# Patient Record
Sex: MALE | Race: WHITE
[De-identification: names, ages, dates, MRNs, and addresses within clinical notes are randomized per-mention and may not be internally consistent; named-entity substitution may affect disease eponyms.]

---

## 2017-03-14 NOTE — US
EXAMINATION TYPE: US kidneys/renal and bladder

 

DATE OF EXAM: 3/14/2017 4:57 PM

 

COMPARISON: 12/2/2015

 

CLINICAL HISTORY: RT flank pain R10.9. Renal stones removed from bilateral kidney in December 2016; d
iabetic

 

EXAM MEASUREMENTS:

 

Right Kidney:  10.0 x 6.4 x 5.1 cm

Left Kidney: 11.1 x 5.3 x 5.1 cm

Post Void Residual Volume: patient was unable to void

 

TECHNOLOGIST IMPRESSION:  wnl

 

Right Kidney: No hydronephrosis or masses seen  

Left Kidney: mid medial cortical cyst = 2.2 x 3.2 x 2.0cm; mid pole hyperechoic focus with posterior 
shadowing = 0.3 x 0.3 x 0.3; hyperechoic wedge shaped junctional defect mid cortex; sonographic"sweat
 sign" is noted adjacent to left kidney which suggests renal failure.   

Bladder: Limited by incomplete distention.

**Bilateral Jets seen: not seen after 3 minute observation

 

 

 

 

IMPRESSION:

1. Simple appearing left renal cyst is stable.

2. Nonobstructing 3 mm left renal calculus.

## 2017-03-23 NOTE — MR
EXAMINATION TYPE: MR shoulder RT wo con

 

DATE OF EXAM: 3/22/2017 8:31 PM

 

COMPARISON: NONE

 

HISTORY: Rt shoulder, arm and side pain

 

TECHNIQUE: Multiplanar, multisequence imaging of the right shoulder is performed without contrast.

 

FINDINGS:

 

Rotator Cuff: There is a partial full-thickness tear at the anterior insertion of the rotator cuff, t
he rotator cuff shows increased signal compatible with tendinosis near its insertion more posteriorly
, the posterior insertion also shows suggestion of partial full-thickness tear at its margin. Fluid s
ignal in the subacromial subdeltoid bursa.

 

Acromioclavicular Joint: Hypertrophic change causes mass effect on the musculotendinous junction of s
upraspinatus

 

Glenohumeral Joint: Intact

 

Labrum: Superior labrum shows some abnormal signal and possible irregularity, truncation, there may b
e some degenerative internal signal, there may be a small tear posteriorly at the superior aspect.

 

Biceps Tendon: Shows normal position in the bicipital groove. Some fluid signal is present along the 
biceps tendon, some thickening suggesting some tendinosis proximally

 

Bone marrow signal: Maintained

 

Other: Distal acromion shows possible small spur, correlate for possible impingement.

 

IMPRESSION: 

Partial full-thickness tear as described of the rotator cuff. Correlate for impingement. Additional f
indings above.

## 2017-08-24 NOTE — MR
EXAMINATION TYPE: MR cervical spine wo con

 

DATE OF EXAM: 8/24/2017

 

COMPARISON: NONE

 

HISTORY: Neck pain

 

TECHNIQUE: 

Multiplanar, multisequence images of the cervical spine were acquired.

 

C2-C3: No evidence for degenerative disc disease.  No disc bulge/herniation or protrusion.  No Canal 
stenosis.  Foramina are patent bilaterally.

 

C3-C4: Small central protrusion is present with minimal anterior thecal sac compression. No spinal ca
nal stenosis or neural foraminal stenosis is present.

 

C4-C5: There is left paracentral focal bulging with mild anterior thecal sac compression. This is in 
close approximation with spinal cord. No spinal canal stenosis stenosis is present. There is moderate
 left foraminal narrowing.

 

C5-C6: There is loss of disc height through this level. No spinal canal stenosis or neural foraminal 
stenosis is present.

 

C6-C7: There is a large central broad-based disc herniation with extension beyond the endplates. This
 has moderate anterior thecal sac compression. This comes in close approximation with spinal cord. Co
rd deformity is not identified. No spinal canal narrowing is present at the inferior C6 level with an
 AP diameter is 0.9 cm. Disc space narrowing is present. Bilateral moderate foraminal narrowing is pr
esent.

 

C7-T1: Minimal disc bulge has anterior thecal sac flattening. No AP spinal canal stenosis or neural f
oraminal stenosis present.

 

Cervical segments are intact.  There is normal alignment.  Cervical spinal cord is of normal signal. 
 Craniovertebral junction relationships are within normal limits.  

 

IMPRESSION:

1. Large disc herniation C6-7 contributes to mild canal narrowing.

2. Loss of disc height C5-6.

3. Foraminal narrowing C6-7 bilaterally and on the right C4-5.

## 2018-02-01 NOTE — BD
EXAMINATION TYPE: MG DEXA axial skeleton.  

 

DATE OF EXAM: 2/1/2018

 

COMPARISON: NONE

 

CLINICAL HISTORY: Calcification and ossification of muscle. Screening for osteoporosis.

 

Height:

Weight:

 

FRAX RISK QUESTIONS:

Alcohol (3 or more units per day):  no

Family History (Parent hip fracture):  yes /mother

Glucocorticoids (More than 3mos):  no

           (Ex: prednisone, prednisolone, methylprednisolone, dexamethasone, and hydrocortisone).    
     

History of Fracture in Adulthood: no

Secondary Osteoporosis:

  1.  Type 1 Diabetes: no

  2.  Hyperthyroidism: no

  3.  Menopause before 45: na

  4.  Malnutrition: no

  5.  Chronic liver disease: no

Rheumatoid Arthritis: no

Current Tobacco Use: no

 

RISK FACTORS 

HISTORY OF: 

Surgery to Spine/Hip(right/left)/Wrist (right/left): no

Family History of Osteoporosis: no

Active: yes

Diet low in dairy products/other sources of calcium:  yes

Lost more than 2 inches in height since high school: yes

Frequent falls: no

Poor Health: no

Hyperparathyroidism: no

Adrenal Insufficiency: no

 

MEDICATIONS: metformin, Januvia, lexapro, lopressor, lovastatin, vit c

 

 

Additional History:

 

 

EXAM MEASUREMENTS: 

Bone mineral densitometry was performed using the 2C2P System.

Bone mineral density as measured about the Lumbar spine is:  

----- L1-L4(G/cm2): 1.192

T Score Values are as follows:

----- L2: 0.3

----- L3: 0.4

----- L4: 0.6

----- L1-L4: 0.1

Bone mineral density has: baseline

 

Bone mineral density about the R hip (g/cm2): 0.926

Bone mineral density about the L hip (g/cm2): 0.980

T Score values are as follows:

-----R Neck: -0.8

-----L Neck: -0.4

-----R Total: 0.1

-----L Total: 0.3

Bone mineral density has:   baseline

 

 

IMPRESSION:

Normal (Values between +1 and -1 indicate normal bone mass).  Consider repeating this study in 5 year
s or sooner if there is some new clinical indication.

 

 

 

 

 

NOTE:  T-SCORE=SD OF THE YOUNG ADULT MEAN.
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
## 2018-09-29 NOTE — XR
EXAMINATION TYPE: XR chest 2V

 

DATE OF EXAM: 9/29/2018

 

HISTORY: Pain.

 

REFERENCE: Previous study dated 12/2/2015.

 

FINDINGS: The lungs are clear. Pleural spaces are clear. The heart is not enlarged.

 

IMPRESSION: 

 

NO ACUTE INTRATHORACIC ABNORMALITY.

## 2018-09-29 NOTE — XR
EXAMINATION TYPE: XR pelvis AP view , ONE VIEW

 

DATE OF EXAM ORDERED: 9/29/2018

 

HISTORY: Pain.

 

COMPARISON: Previous study dated 3/4/2010.

 

FINDINGS:  Osseous structures about the pelvis are normal. No fracture, dislocation or other acute os
seous lesion is seen.

 

IMPRESSION: 

 

NO ACUTE OSSEOUS LESION.
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
## 2018-09-29 NOTE — XR
EXAMINATION TYPE: XR shoulder complete LT , 3 VIEWS

 

DATE OF EXAM ORDERED: 9/29/2018

 

HISTORY: Pain.

 

COMPARISON: None.

 

FINDINGS:  No fracture, dislocation or other acute osseous lesion is seen. There are hypertrophic nalini
nges present in the left AC joint.

 

IMPRESSION: 

 

NO ACUTE OSSEOUS LESION.
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
## 2019-02-14 NOTE — XR
Abdomen

 

HISTORY: Calculus of kidney

 

Frontal view of the abdomen on 2 images correlated prior exam 12/28/2016

 

There is calcification over the lower pole the left kidney measuring approximately 6 to 7 mm in great
est dimension. Lung bases are clear. There is no evident pneumoperitoneum or bowel obstruction. Calci
fications in the pelvis felt likely to be vascular.

 

IMPRESSION: Nephrolithiasis left kidney.

## 2019-02-22 NOTE — XR
EXAMINATION TYPE: XR KUB

 

DATE OF EXAM: 2/22/2019

 

COMPARISON: 2/14/2019

 

HISTORY: Renal calculus

 

TECHNIQUE: One view abdominal series

 

FINDINGS:  

The osseous structures are intact.  The bowel gas pattern is nonspecific. 

 

Right kidney: No definite calculi

Left kidney: There are 2 definite calcifications. Mid pole 3 mm calcification and Stable 7 mm lower p
ole left renal calculus. Tiny 1 to 2 mm additional lower pole calculus suspected.

 

Pelvis: Within the pelvis there are stable appearing calcifications likely vascular. Hypertrophic nalini
nge of the hips noted. Hypertrophic and degenerative change of the spine.

 

IMPRESSION:  

1. Stable left-sided nephrolithiasis.

## 2019-09-09 NOTE — XR
EXAMINATION TYPE: XR KUB

 

DATE OF EXAM: 9/9/2019

 

COMPARISON: NONE

 

HISTORY: Pain

 

TECHNIQUE: One view abdominal series

 

FINDINGS:  

The osseous structures are intact.  The bowel gas pattern is nonspecific. Calcifications are seen ove
rlying the left kidney the largest measuring 7 mm. Additional calcifications in the pelvis are stable
.

 

Hypertrophic change of the vertebral column and hips noted.

 

IMPRESSION:  

1. There are approximately 3 calcifications overlying the mid and lower pole the left kidney largest 
measuring 7 mm.

2. Pelvic calcifications are stable from the prior exam and therefore likely vascular
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
## 2019-10-03 NOTE — FL
EXAMINATION TYPE: FL urography retrograde

 

DATE OF EXAM: 10/3/2019

 

COMPARISON: NONE

 

HISTORY: Fluoroscopy

 

TECHNIQUE: Fluoroscopy.

 

FINDINGS: 1.03 minutes of fluoroscopy provided.

 

IMPRESSION:  As Above.

## 2019-10-03 NOTE — P.OP
Date of Procedure: 10/03/19


Preoperative Diagnosis: 


ureteral calculus


Postoperative Diagnosis: 


same


Procedure(s) Performed: 


cystoscopy right sided ureteroscopy holmium laser lithotripsy stone basketing 

and stent exchange, left retrograde pyelogram


Implants: 


4.8-Cymraes by 26 cm stent


Indications for Procedure: 


69-year-old male that presented with a right-sided ureteral calculi status post 

stent placement he presents today for right-sided ureteroscopy. I have explained

the operation/procedure to the patient, including the risks, benefits, side 

effects, alternative therapies (including not receiving the proposed treatment 

or service), the likelihood of the patient achieving his/her goals, and 

potential recuperation problems for the procedure/sedation/analgesia, as well as

any blood products, if indicated. I also explained to the patient the risks, 

benefits and side effects of the alternatives, as well as the risks related to 

not receiving the proposed procedure, care, treatment, or services


Operative Findings: 


multipe calculus in the right distal ureter


Description of Procedure: 


the patient was brought to the operating room and general anesthesia was in

duced.  He was prepped and draped in sterile fashion and placed in dorsal 

lithotomy position.  Cystoscope fitted with a 22 sheath was inserted per urethra

and advanced into the bladder the stent was grasped with a stent grasper and 

removed intact. Next the cystoscope was reinserted per urethra into the bladder 

on the right ureteral orifice was visualized.  We attempted to place a wire into

the ureteral orifice but the stone was impacted at the UVJ.  At this time a 

rigid ureteroscope was inserted and advanced into the bladder stone was 

fragmented using holmium laser lithotripsy.  And stone basketing was performed. 

Of note there were multiple calculi more than 5 calculus in total along the 

distal ureter all the stones were broken up using the holmium laser and 

basketing using the zero tip stone basket.  After the stones were cleared the 

ureteroscope was advanced into the proximal ureter no additional stones were 

visualized the ureteroscope was withdrawn and a Glidewire was advanced through 

the ureteroscope and into the renal pelvis.  Next a 12-14 Cymraes access sheath 

was advanced over the Glidewire into the renal pelvis.  Flexible ureteroscopy 

was inserted per sheath and renoscopy was performed which showed no calculus in 

the kidney. RPG was also performed which showed no filling defects. Pullback 

ureteroscopy confirmed no injury to the ureter and no residual stones.  Next a 

cystoscope fitted with a 22 sheath was inserted per urethra the right ureteral 

orifice was visualized and a 0.035 Glidewire was advanced to the renal pelvis.  

A 4.8-Cymraes by 26 cm stent was advanced over the wire into the renal pelvis, 

curl was confirmed fluoroscopically proximally and using the cystoscope distally

attention was then carried to the left side, the left ureteral orifice was 

intubated with a 5-Cymraes open-ended catheter and retrograde pyelogram was 

performed which showed no hydronephrosis or any evidence of stone along the 

ureter. There were 2 radiopaque stones in the left kidney.  The bladder was 

emptied at the end of the case and the cystoscope withdrawn withdrawn the 

patient was awakened from anesthesia and taken to PACU in stable condition

## 2019-10-03 NOTE — XR
EXAMINATION TYPE: XR abdomen 1V

 

DATE OF EXAM: 10/3/2019

 

COMPARISON: 9/19/2019

 

HISTORY: Preop

 

TECHNIQUE: One view abdominal series

 

FINDINGS:  

Left kidney: There are approximately 6 calcifications overlying the left renal outline the largest me
asuring 7 mm.

 

Right kidney: No definite calcifications. Right-sided double-J ureteral stent noted.

 

Pelvis: Nonspecific bilateral pelvic calculi are stable from the prior exam.

 

Mild hypertrophic changes of vertebral column. Arthropathy of the hip joints. Bowel gas pattern nonsp
ecific.

 

IMPRESSION:

1. Stable left-sided nephrolithiasis.

2. Right-sided double-J ureteral stent.

## 2020-05-27 NOTE — XR
KUB

 

HISTORY: Renal calculus

 

Frontal KUB submitted on 2 images

 

And correlation to prior exam 9/9/2019 10/3/2019

 

There are probable vascular calcifications noted within the pelvis. The calcifications seen over the 
left kidney are not seen with certainty on today's exam, there is overlying bowel gas. Mid pole calcu
corey may be present, there is possible fragmentation of the lower pole the left kidney calculus.

 

IMPRESSION: Suspect interval lithotripsy. Probable left-sided nephrolithiasis. Limitations as describ
ed. Monitor Summary:    SR [id number]  Occasional PVCs, occasional PACs  0.16/0.08/0.40
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
## 2020-08-10 NOTE — XR
EXAMINATION TYPE: XR forearm bilateral

 

DATE OF EXAM: 8/10/2020

 

CLINICAL HISTORY: Pain after fall injury.

 

TECHNIQUE: Two views of the bilateral forearms are obtained.

 

COMPARISON: None.

 

FINDINGS:  There is no acute fracture or dislocation seen in either radius or ulna. There are spurs a
t level of bilateral olecranon, left larger than right at site of distal triceps tendon attachment. C
arpal joint spaces in both breasts appear within normal limits.  The overlying soft tissue appears un
remarkable bilaterally.

 

IMPRESSION:  There is no acute fracture or dislocation seen in either forearm.
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
## 2020-08-10 NOTE — XR
EXAMINATION TYPE: XR pelvis AP view

 

DATE OF EXAM: 8/10/2020

 

CLINICAL HISTORY: Pain after fall injury.

 

TECHNIQUE: A single AP view of the pelvis is obtained.

 

COMPARISON: Prior pelvic x-ray September 29, 2018

 

FINDINGS:  There is no acute fracture/dislocation evident in the pelvis.  The hip and sacroiliac join
ts appear symmetric and unchanged from prior. Some mild to moderate bilateral acetabular spurring rem
ains present. Occasional scattered pelvic phleboliths redemonstrated. The pubic symphysis remains int
act.

 

IMPRESSION:  There is no acute fracture or dislocation in the pelvis. No significant change from prio
r.
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
## 2020-08-10 NOTE — XR
EXAMINATION TYPE: XR knee complete bilateral

 

DATE OF EXAM: 8/10/2020

 

CLINICAL HISTORY: Pain after fall injury.

 

TECHNIQUE:  Three views of the bilateral knees are obtained.

 

COMPARISON: None.

 

FINDINGS:  There is no acute fracture/dislocation evident in either knee. Mild to moderate tricompart
ment joint space loss with mild spurring. Anterior superior and inferior patellar spurs bilaterally. 
Mild vascular desiccation with posterior soft tissue bilaterally.

 

IMPRESSION:  There is no acute fracture or dislocation in either knee.
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
## 2020-09-29 ENCOUNTER — HOSPITAL ENCOUNTER (OUTPATIENT)
Dept: HOSPITAL 47 - RADXRMAIN | Age: 70
Discharge: HOME | End: 2020-09-29
Attending: UROLOGY
Payer: MEDICARE

## 2020-09-29 DIAGNOSIS — N20.0: Primary | ICD-10-CM

## 2020-09-29 PROCEDURE — 74018 RADEX ABDOMEN 1 VIEW: CPT

## 2020-09-29 NOTE — XR
KUB

 

history: N 20.0

 

Frontal KUB submitted and correlated prior KUB 11/11/2019, ultrasound 9/24/2020

 

There is no evident bowel obstruction or pneumoperitoneum. Vague calcification present over the lower
 pole left kidney measures only 2 to 3 mm. Overlying bowel gas may obscure underlying detail. Vascula
r calcifications are present within the pelvis. Difficult to exclude distal ureteral calculus.

 

IMPRESSION: Indeterminate pelvic calcifications, there may be distal ureteral calculus on the right, 
left-sided nephrolithiasis

## 2020-10-13 ENCOUNTER — HOSPITAL ENCOUNTER (OUTPATIENT)
Dept: HOSPITAL 47 - LABPAT | Age: 70
Discharge: HOME | End: 2020-10-13
Attending: UROLOGY
Payer: MEDICARE

## 2020-10-13 DIAGNOSIS — E11.9: ICD-10-CM

## 2020-10-13 DIAGNOSIS — N20.1: ICD-10-CM

## 2020-10-13 DIAGNOSIS — Z01.818: Primary | ICD-10-CM

## 2020-10-13 DIAGNOSIS — R31.29: ICD-10-CM

## 2020-10-13 LAB
ANION GAP SERPL CALC-SCNC: 10 MMOL/L
BASOPHILS # BLD AUTO: 0.1 K/UL (ref 0–0.2)
BASOPHILS NFR BLD AUTO: 1 %
BUN SERPL-SCNC: 27 MG/DL (ref 9–20)
CALCIUM SPEC-MCNC: 9.4 MG/DL (ref 8.4–10.2)
CHLORIDE SERPL-SCNC: 106 MMOL/L (ref 98–107)
CO2 SERPL-SCNC: 24 MMOL/L (ref 22–30)
EOSINOPHIL # BLD AUTO: 0.4 K/UL (ref 0–0.7)
EOSINOPHIL NFR BLD AUTO: 5 %
ERYTHROCYTE [DISTWIDTH] IN BLOOD BY AUTOMATED COUNT: 3.92 M/UL (ref 4.3–5.9)
ERYTHROCYTE [DISTWIDTH] IN BLOOD: 12.9 % (ref 11.5–15.5)
GLUCOSE SERPL-MCNC: 144 MG/DL (ref 74–99)
HCT VFR BLD AUTO: 37 % (ref 39–53)
HGB BLD-MCNC: 12.6 GM/DL (ref 13–17.5)
LYMPHOCYTES # SPEC AUTO: 1.8 K/UL (ref 1–4.8)
LYMPHOCYTES NFR SPEC AUTO: 24 %
MCH RBC QN AUTO: 32.1 PG (ref 25–35)
MCHC RBC AUTO-ENTMCNC: 34 G/DL (ref 31–37)
MCV RBC AUTO: 94.3 FL (ref 80–100)
MONOCYTES # BLD AUTO: 0.4 K/UL (ref 0–1)
MONOCYTES NFR BLD AUTO: 6 %
NEUTROPHILS # BLD AUTO: 4.7 K/UL (ref 1.3–7.7)
NEUTROPHILS NFR BLD AUTO: 63 %
PH UR: 5 [PH] (ref 5–8)
PLATELET # BLD AUTO: 189 K/UL (ref 150–450)
POTASSIUM SERPL-SCNC: 4.7 MMOL/L (ref 3.5–5.1)
SODIUM SERPL-SCNC: 140 MMOL/L (ref 137–145)
SP GR UR: 1.02 (ref 1–1.03)
UROBILINOGEN UR QL STRIP: <2 MG/DL (ref ?–2)
WBC # BLD AUTO: 7.5 K/UL (ref 3.8–10.6)

## 2020-10-13 PROCEDURE — 36415 COLL VENOUS BLD VENIPUNCTURE: CPT

## 2020-10-13 PROCEDURE — 87086 URINE CULTURE/COLONY COUNT: CPT

## 2020-10-13 PROCEDURE — 80048 BASIC METABOLIC PNL TOTAL CA: CPT

## 2020-10-13 PROCEDURE — 85025 COMPLETE CBC W/AUTO DIFF WBC: CPT

## 2020-10-13 PROCEDURE — 81003 URINALYSIS AUTO W/O SCOPE: CPT

## 2020-10-19 ENCOUNTER — HOSPITAL ENCOUNTER (OUTPATIENT)
Dept: HOSPITAL 47 - OR | Age: 70
Discharge: HOME | End: 2020-10-19
Attending: UROLOGY
Payer: MEDICARE

## 2020-10-19 VITALS — TEMPERATURE: 97.5 F

## 2020-10-19 VITALS — SYSTOLIC BLOOD PRESSURE: 127 MMHG | DIASTOLIC BLOOD PRESSURE: 73 MMHG | HEART RATE: 78 BPM

## 2020-10-19 VITALS — BODY MASS INDEX: 27 KG/M2

## 2020-10-19 VITALS — RESPIRATION RATE: 16 BRPM

## 2020-10-19 DIAGNOSIS — K42.9: ICD-10-CM

## 2020-10-19 DIAGNOSIS — Z87.2: ICD-10-CM

## 2020-10-19 DIAGNOSIS — N13.2: Primary | ICD-10-CM

## 2020-10-19 DIAGNOSIS — Z79.899: ICD-10-CM

## 2020-10-19 DIAGNOSIS — Z98.890: ICD-10-CM

## 2020-10-19 DIAGNOSIS — I10: ICD-10-CM

## 2020-10-19 DIAGNOSIS — F32.9: ICD-10-CM

## 2020-10-19 DIAGNOSIS — Z87.39: ICD-10-CM

## 2020-10-19 DIAGNOSIS — E78.5: ICD-10-CM

## 2020-10-19 DIAGNOSIS — Z79.84: ICD-10-CM

## 2020-10-19 DIAGNOSIS — Z82.49: ICD-10-CM

## 2020-10-19 DIAGNOSIS — Z87.442: ICD-10-CM

## 2020-10-19 DIAGNOSIS — E11.9: ICD-10-CM

## 2020-10-19 DIAGNOSIS — J45.909: ICD-10-CM

## 2020-10-19 LAB
GLUCOSE BLD-MCNC: 128 MG/DL (ref 75–99)
GLUCOSE BLD-MCNC: 172 MG/DL (ref 75–99)

## 2020-10-19 PROCEDURE — 74420 UROGRAPHY RTRGR +-KUB: CPT

## 2020-10-19 PROCEDURE — 74018 RADEX ABDOMEN 1 VIEW: CPT

## 2020-10-19 PROCEDURE — 52353 CYSTOURETERO W/LITHOTRIPSY: CPT

## 2020-10-19 NOTE — FL
Fluoroscopy

 

HISTORY: Stent placement

 

43 seconds fluoroscopy time supplied to the referring clinician.  3 intraoperative C-arm images docum
ent the procedure. See dictated report from urology.

## 2020-10-19 NOTE — P.OP
Date of Procedure: 10/19/20


Preoperative Diagnosis: 


Right renal calculi


Postoperative Diagnosis: 


Same


Procedure(s) Performed: 


Cystoscopy, right ureteroscopy, holmium laser lithotripsy, retrograde pyelogram


Implants: 


None


Anesthesia: GETA


Surgeon: Thomas Almonte


Estimated Blood Loss (ml): 1


Pathology: none sent


Condition: stable


Disposition: PACU


Indications for Procedure: 


Mr Salmon is a 71 yo male with hx of recurrent Uric acid stones. He is been 

having recurrent flank pain, he is been passing small stone fragments. He 

underwent RBUS that showed a 9 mm right sided renal stone. I discussed the 

option of doing right sided ureteroscopy with holmium laser lithotripsy with 

him.  Discussed risk of bleeding and infection and injury to the ureter. He 

understood all the risks and agreed to proceed.


Operative Findings: 


multiple 1-2 mm calcification throughout the kidney, that were dusted using the 

holmium laser. No obstructive stones, mild hydronephrosis on Reterograde 

pyelogram, ureter is patent no signs of obstruction. Hydronephrosis most likely 

from recently passed stone, rather than obstruction 


Description of Procedure: 


Patient was brought to the operating room, general anesthesia was induced.  He 

was prepped and draped so fashion a placement dorsal lithotomy position.  

Cystoscopy fitted with 21 sheath was inserted per urethra, cystoscopy was 

performed showed no abnormality within the bladder.  At this time the right 

ureteral orifice was intubated with a 5-Armenian open-ended catheter, retrograde 

Polygram was performed showed no filling defect along the course a ureter mild 

swelling of the collecting system was noticed.   At this time a sensor wire was 

advanced through the catheter, and the catheter was removed the wire in place.  

Next the flexible ureteroscope was advanced over the wire and renoscopy was p

erformed which showed no free stones but multiple parenchymal stones.  All 

measured between 1-2 mm in size.  Using the holmium laser the stones were 

dusted, repeat renoscopy showed no sizable fragments or any free stones, 

retrograde pyelogram was performed to ensure that all calyces were evaluated.  

Pullback ureteroscopy was performed showed no injury to the ureter and no 

ureteral stone, of note the ureter was patent and there was no evidence of 

stricture.  At this time the cystoscope was reinserted and the bladder was 

emptied.  The patient was awakened from anesthesia and taken recovery in stable 

condition

## 2020-10-19 NOTE — XR
KUB

 

HISTORY: Kidney stone

 

Frontal KUB submitted on 2 images and correlated to prior exam 9/29/2020

 

Multiple calcifications are seen within the pelvis similar to prior exam. Punctate calcifications see
n overlying the left kidney. Exam is stable.

 

IMPRESSION: Stable exam.

## 2020-10-19 NOTE — P.HPIHPCON
History of Present Illness


H&P Date: 10/19/20


Chief Complaint: right flank pain





Mr Salmon is a 69 yo male with hx of recurrent Uric acid stones. He is been 

having recurrent flank pain, he is been passing small stone fragments. He 

underwent RBUS that showed a 9 mm right sided renal stone. I discussed the 

option of doing right sided ureteroscopy with holmium laser lithotripsy with 

him.  Discussed risk of bleeding and infection and injury to the ureter. He 

understood all the risks and agreed to proceed. 


Consent for Procedure: 


I have explained the operation/procedure to the patient, including the risks, 

benefits, side effects, alternative therapies (including not receiving the 

proposed treatment or service), the likelihood of the patient achieving his/her 

goals, and potential recuperation problems for the procedure/sedation/analgesia,

as well as any blood products, if indicated. I also explained to the patient the

risks, benefits and side effects of the alternatives, as well as the risks 

related to not receiving the proposed procedure, care, treatment, or services.








Past Medical History


Past Medical History: Asthma, Diabetes Mellitus, Hyperlipidemia, Hypertension


Additional Past Medical History / Comment(s): hx heart murmer, kidney stones, 

umbilical hernia, states does not use inhaler for asthma


History of Any Multi-Drug Resistant Organisms: None Reported


Past Surgical History: Orthopedic Surgery


Additional Past Surgical History / Comment(s): rt knee surgery, mole removed 

from rt upper chest, rt shoulder-rotator cuff, lithotripsy, cystoscopy


Past Anesthesia/Blood Transfusion Reactions: No Reported Reaction


Smoking Status: Never smoker





- Past Family History


  ** Sister(s)


Family Medical History: Deep Vein Thrombosis (DVT)





Medications and Allergies


                                Home Medications











 Medication  Instructions  Recorded  Confirmed  Type


 


metFORMIN HCL [Glucophage] 1,000 mg PO BID 08/28/15 10/16/20 History


 


lisinopriL [Zestril] 10 mg PO QAM 12/16/16 10/16/20 History


 


Escitalopram Oxalate [Lexapro] 10 mg PO HS 01/23/19 10/16/20 History


 


Escitalopram Oxalate [Lexapro] 20 mg PO QAM 01/23/19 10/16/20 History


 


Multivitamins, Thera [Multivitamin 2 tab PO DAILY 01/23/19 10/16/20 History





(formulary)]    


 


glipiZIDE [Glucotrol] 5 mg PO BID 10/02/19 10/16/20 History


 


glipiZIDE [Glucotrol] 5 mg PO BID 10/02/19 10/16/20 History


 


Gabapentin [Neurontin] 400 mg PO TID PRN 10/16/20 10/16/20 History


 


Lovastatin [Mevacor] 40 mg PO HS 10/16/20 10/16/20 History


 


Semaglutide [Ozempic] 0.5 mg INJ FR 10/16/20 10/16/20 History


 


glipiZIDE [Glucotrol] 5 mg PO BID 10/16/20 10/16/20 History








                                    Allergies











Allergy/AdvReac Type Severity Reaction Status Date / Time


 


No Known Allergies Allergy   Verified 10/16/20 11:02














Surgical - Exam





- General


well developed, well nourished





- Eyes


PERRL, normal ocular movement





- Respiratory


normal expansion, normal respiratory effort





- Abdomen


Abdomen: soft, non tender





Assessment and Plan


Assessment: 





69 yo hx of right flank pain and 9 mm right sided renal stone 


-OR for right sided ureteroscopy with holmium laser lithotripsy

## 2021-10-05 ENCOUNTER — HOSPITAL ENCOUNTER (EMERGENCY)
Dept: HOSPITAL 47 - EC | Age: 71
Discharge: HOME | End: 2021-10-05
Payer: MEDICARE

## 2021-10-05 VITALS — SYSTOLIC BLOOD PRESSURE: 130 MMHG | DIASTOLIC BLOOD PRESSURE: 73 MMHG | TEMPERATURE: 97.9 F

## 2021-10-05 VITALS — HEART RATE: 72 BPM | RESPIRATION RATE: 18 BRPM

## 2021-10-05 DIAGNOSIS — N20.0: Primary | ICD-10-CM

## 2021-10-05 DIAGNOSIS — Z79.899: ICD-10-CM

## 2021-10-05 DIAGNOSIS — J45.909: ICD-10-CM

## 2021-10-05 DIAGNOSIS — F32.9: ICD-10-CM

## 2021-10-05 DIAGNOSIS — E78.5: ICD-10-CM

## 2021-10-05 DIAGNOSIS — I10: ICD-10-CM

## 2021-10-05 DIAGNOSIS — E11.9: ICD-10-CM

## 2021-10-05 LAB
ALBUMIN SERPL-MCNC: 4.6 G/DL (ref 3.5–5)
ALP SERPL-CCNC: 42 U/L (ref 38–126)
ALT SERPL-CCNC: 17 U/L (ref 4–49)
ANION GAP SERPL CALC-SCNC: 10 MMOL/L
AST SERPL-CCNC: 30 U/L (ref 17–59)
BASOPHILS # BLD AUTO: 0 K/UL (ref 0–0.2)
BASOPHILS NFR BLD AUTO: 0 %
BUN SERPL-SCNC: 34 MG/DL (ref 9–20)
CALCIUM SPEC-MCNC: 10 MG/DL (ref 8.4–10.2)
CHLORIDE SERPL-SCNC: 105 MMOL/L (ref 98–107)
CO2 SERPL-SCNC: 24 MMOL/L (ref 22–30)
EOSINOPHIL # BLD AUTO: 0.2 K/UL (ref 0–0.7)
EOSINOPHIL NFR BLD AUTO: 1 %
ERYTHROCYTE [DISTWIDTH] IN BLOOD BY AUTOMATED COUNT: 3.84 M/UL (ref 4.3–5.9)
ERYTHROCYTE [DISTWIDTH] IN BLOOD: 12.7 % (ref 11.5–15.5)
GLUCOSE SERPL-MCNC: 160 MG/DL (ref 74–99)
HCT VFR BLD AUTO: 36.1 % (ref 39–53)
HGB BLD-MCNC: 12.2 GM/DL (ref 13–17.5)
LYMPHOCYTES # SPEC AUTO: 1.3 K/UL (ref 1–4.8)
LYMPHOCYTES NFR SPEC AUTO: 11 %
MCH RBC QN AUTO: 31.8 PG (ref 25–35)
MCHC RBC AUTO-ENTMCNC: 33.8 G/DL (ref 31–37)
MCV RBC AUTO: 94.1 FL (ref 80–100)
MONOCYTES # BLD AUTO: 0.6 K/UL (ref 0–1)
MONOCYTES NFR BLD AUTO: 5 %
NEUTROPHILS # BLD AUTO: 9.4 K/UL (ref 1.3–7.7)
NEUTROPHILS NFR BLD AUTO: 81 %
PH UR: 5 [PH] (ref 5–8)
PLATELET # BLD AUTO: 208 K/UL (ref 150–450)
POTASSIUM SERPL-SCNC: 4.4 MMOL/L (ref 3.5–5.1)
PROT SERPL-MCNC: 7 G/DL (ref 6.3–8.2)
PROT UR QL: (no result)
RBC UR QL: >182 /HPF (ref 0–5)
SODIUM SERPL-SCNC: 139 MMOL/L (ref 137–145)
SP GR UR: 1.02 (ref 1–1.03)
UROBILINOGEN UR QL STRIP: <2 MG/DL (ref ?–2)
WBC # BLD AUTO: 11.6 K/UL (ref 3.8–10.6)
WBC # UR AUTO: 13 /HPF (ref 0–5)

## 2021-10-05 PROCEDURE — 96361 HYDRATE IV INFUSION ADD-ON: CPT

## 2021-10-05 PROCEDURE — 80053 COMPREHEN METABOLIC PANEL: CPT

## 2021-10-05 PROCEDURE — 99284 EMERGENCY DEPT VISIT MOD MDM: CPT

## 2021-10-05 PROCEDURE — 96374 THER/PROPH/DIAG INJ IV PUSH: CPT

## 2021-10-05 PROCEDURE — 74176 CT ABD & PELVIS W/O CONTRAST: CPT

## 2021-10-05 PROCEDURE — 81001 URINALYSIS AUTO W/SCOPE: CPT

## 2021-10-05 PROCEDURE — 36415 COLL VENOUS BLD VENIPUNCTURE: CPT

## 2021-10-05 PROCEDURE — 87086 URINE CULTURE/COLONY COUNT: CPT

## 2021-10-05 PROCEDURE — 85025 COMPLETE CBC W/AUTO DIFF WBC: CPT

## 2021-10-05 NOTE — CT
EXAMINATION TYPE: CT abdomen pelvis wo con

 

DATE OF EXAM: 10/5/2021

 

COMPARISON: 8/28/2015

 

INDICATION: Lt flank pain

 

DLP: 727.7 mGycm, Automated exposure control for dose reduction was used.

 

CONTRAST:  0 mL of Isovue 300. 

                        Study performed without Oral Contrast

 

TECHNIQUE: Axial images were obtained from above the diaphragm to the pubic rami in the axial plane a
t 5 mm thick sections.  Reconstructed images are reviewed on the computer in the coronal plane.  

 

FINDINGS:

 

Limited CT sections are obtained the lung bases.  The lung bases are clear.

 

CT ABDOMEN:

 

Liver: Normal

 

Spleen: Normal

 

Pancreas: Normal

 

Adrenal glands: The adrenal glands are normal.

 

Gallbladder: Normal  

 

Kidneys: No masses are evident.  There is a 0.6 cm calcification in the left ureterovesical junction.
 Just superior to this there appear to be 2 additional punctate calcifications may be additional tiny
 stones present. Moderate left hydroureter is present. Some mild periureteral inflammatory changes ar
e present. Perinephric stranding is present. There are multiple calcifications within the left kidney
. Within the anterior lateral left kidney there is a 0.4 cm mid renal stone. A 0.3 similar calcificat
ions in the posterior left mid kidney. Punctate calcifications are in the superior pole. Moderate lef
t hydronephrosis is present. Perinephric stranding is present. There is a 3.3 cm cyst measuring 12 Ho
unsfield units anterior mid left kidney. Nonobstructing punctate renal stones are present within the 
right kidney the largest renal stone is at the mid to inferior pole measuring 0.5 cm without obstruct
ion. No hydroureter is present on the right.

 

Aorta: Vascular calcification is within the aorta. 

 

Inferior vena cava: Normal.

 

CT PELVIS: 

Loops of bowel within the abdomen and pelvis are normal. There are scattered diverticuli within the s
igmoid colon.    There are loops of bowel which are incompletely distended or lack oral contrast limi
ting their evaluation.

 

Appendix: Normal as visualized.

 

Urinary bladder: Normal. 

 

Genitourinary structures: Prostate is slightly prominent

 

Osseous structures: No suspicious lytic or sclerotic lesions.

 

IMPRESSIONS:

1.  Obstructing 0.6 cm distal left ureterovesical junction stone there may be 2 adjacent punctate sto
ruel.

2. Moderate left hydronephrosis and moderate left hydroureter. Perinephric stranding is noted.

3. Multiple bilateral nonobstructing renal stones.

4. Diverticulosis without diverticulitis.

## 2021-10-05 NOTE — ED
General Adult HPI





- General


Chief complaint: Urogenital


Stated complaint: nausea, vomiting, abd pain


Time Seen by Provider: 10/05/21 13:43


Source: patient, RN notes reviewed, old records reviewed


Mode of arrival: ambulatory


Limitations: no limitations





- History of Present Illness


Initial comments: 





Patient is a 71-year-old male presenting to emergency Department with complaints

of left-sided flank pain that started suddenly today.  He does have a long 

history of kidney stones, currently sees Dr. Almonte for them.  He actually had 

an appointment with him today at 4 PM however called her office stating that he 

had to come into the ER because of how bad his pain was.  He states his pain 

currently did come down slightly, is currently a 6/10.  Earlier was a 10/10.  He

describes the pain as left flank with radiation into the left side of his 

abdomen.  He has been passing a lot of little stones over the past year, he is 

familiar with the pain but this was a lot more intense than usual.  He does have

some mild nausea.  He denies any chest pain or shortness of breath, no fevers or

chills.  He did notice some mild hematuria today.  Patient has no further 

complaints.  His vitals are stable upon arrival.





- Related Data


                                Home Medications











 Medication  Instructions  Recorded  Confirmed


 


lisinopriL [Zestril] 10 mg PO QAM 12/16/16 10/19/20


 


Escitalopram Oxalate [Lexapro] 20 mg PO QAM 01/23/19 10/16/20


 


Multivitamins, Thera [Multivitamin 2 tab PO DAILY 01/23/19 10/16/20





(formulary)]   


 


Gabapentin [Neurontin] 400 mg PO TID PRN 10/16/20 10/16/20


 


Lovastatin [Mevacor] 40 mg PO HS 10/16/20 10/16/20








                                  Previous Rx's











 Medication  Instructions  Recorded


 


Ketorolac [Toradol] 10 mg PO Q8HR #15 tab 10/05/21


 


Ondansetron Odt [Zofran Odt] 4 mg PO Q8HR PRN #10 tab 10/05/21


 


Tamsulosin [Flomax] 0.4 mg PO DAILY #7 cap 10/05/21











                                    Allergies











Allergy/AdvReac Type Severity Reaction Status Date / Time


 


No Known Allergies Allergy   Verified 10/05/21 17:06














Review of Systems


ROS Statement: 


Those systems with pertinent positive or pertinent negative responses have been 

documented in the HPI.





ROS Other: All systems not noted in ROS Statement are negative.





Past Medical History


Past Medical History: Asthma, Diabetes Mellitus, Hyperlipidemia, Hypertension, 

Skin Disorder


Additional Past Medical History / Comment(s): hx heart murmer, kidney stones, 

umbilical hernia,


History of Any Multi-Drug Resistant Organisms: None Reported


Past Surgical History: Orthopedic Surgery


Additional Past Surgical History / Comment(s): rt knee surgery, mole removed 

from rt upper chest, rt shoulder-rotator cuff


Past Anesthesia/Blood Transfusion Reactions: No Reported Reaction


Past Psychological History: Depression


Smoking Status: Never smoker


Past Alcohol Use History: None Reported





- Past Family History


  ** Sister(s)


Family Medical History: Deep Vein Thrombosis (DVT)





General Exam





- General Exam Comments


Initial Comments: 





GENERAL: 


Patient is well-developed and well-nourished.  Patient is nontoxic and in no acu

te distress.





HEAD: 


Atraumatic, normocephalic.





EYES:


Pupils equal round and reactive to light, extraocular movements intact, sclera 

anicteric, conjunctiva are normal.  Eyelids were unremarkable.





ENT: 


TMs normal, nares patent, oropharynx clear without exudates.  Moist mucous mem

branes.





NECK: 


Normal range of motion, supple without lymphadenopathy or JVD.





LUNGS:


Unlabored respirations.  Breath sounds clear to auscultation bilaterally and 

equal.  No wheezes rales or rhonchi.





HEART:


Regular rate and rhythm without murmurs, rubs or gallops.





ABDOMEN: 


Soft, nontender, normoactive bowel sounds.  No guarding, no rebound.  No masses 

appreciated.  Patient has some mild left flank pain.





: Deferred 





MUSCULOSKELETAL: 


Normal extremities with adequate strength and normal range of motion, no pitting

or edema.  No clubbing or cyanosis.





NEUROLOGICAL: 


Patient is alert and oriented x 3.  Normal speech, normal gait.   





PSYCH:


Normal mood, normal affect.





SKIN:


 Warm, Dry, normal turgor, no rashes or lesions noted.


Limitations: no limitations





Course


                                   Vital Signs











  10/05/21





  12:33


 


Temperature 98.3 F


 


Pulse Rate 72


 


Respiratory 18





Rate 


 


Blood Pressure 151/78


 


O2 Sat by Pulse 97





Oximetry 














Medical Decision Making





- Medical Decision Making





Patient is a 71-year-old male here with left flank pain started suddenly today. 

He has long history of kidney stones, sees Dr. Almonte. Had an appt. today at 4p

m, but came here.  Secondary to severe pain.  He denies some mild hematuria 

today.  His vitals are stable, afebrile.  Labs showing a very slight white count

11.6, creatinine is 2.04, BUN is 34, this is not far off from his baseline.  

Urinalysis shows a large amount of blood, rare bacteria.  CT shows an 

obstructing 0.6 cm distal left UVJ junction stone, there are some adjacent 

stones as well.  Moderate left hydronephrosis, moderate left hydroureter, 

perinephric stranding.  Discussed case with Dr. Butler who is okay with patient 

going home and f/u with Suzy.  She was given fluids, Toradol he's been resting

currently.  Currently he is pain-free.  He is agreeable to this plan of care and

patient stable for discharge.  Return parameters were discussed with him and he 

verbalized understanding.  Case discussed with Dr. Hodges.





- Lab Data


Result diagrams: 


                                 10/05/21 14:41





                                 10/05/21 14:41


                                   Lab Results











  10/05/21 10/05/21 10/05/21 Range/Units





  14:41 14:41 14:41 


 


WBC  11.6 H    (3.8-10.6)  k/uL


 


RBC  3.84 L    (4.30-5.90)  m/uL


 


Hgb  12.2 L    (13.0-17.5)  gm/dL


 


Hct  36.1 L    (39.0-53.0)  %


 


MCV  94.1    (80.0-100.0)  fL


 


MCH  31.8    (25.0-35.0)  pg


 


MCHC  33.8    (31.0-37.0)  g/dL


 


RDW  12.7    (11.5-15.5)  %


 


Plt Count  208    (150-450)  k/uL


 


MPV  7.3    


 


Neutrophils %  81    %


 


Lymphocytes %  11    %


 


Monocytes %  5    %


 


Eosinophils %  1    %


 


Basophils %  0    %


 


Neutrophils #  9.4 H    (1.3-7.7)  k/uL


 


Lymphocytes #  1.3    (1.0-4.8)  k/uL


 


Monocytes #  0.6    (0-1.0)  k/uL


 


Eosinophils #  0.2    (0-0.7)  k/uL


 


Basophils #  0.0    (0-0.2)  k/uL


 


Sodium    139  (137-145)  mmol/L


 


Potassium    4.4  (3.5-5.1)  mmol/L


 


Chloride    105  ()  mmol/L


 


Carbon Dioxide    24  (22-30)  mmol/L


 


Anion Gap    10  mmol/L


 


BUN    34 H  (9-20)  mg/dL


 


Creatinine    2.04 H  (0.66-1.25)  mg/dL


 


Est GFR (CKD-EPI)AfAm    37  (>60 ml/min/1.73 sqM)  


 


Est GFR (CKD-EPI)NonAf    32  (>60 ml/min/1.73 sqM)  


 


Glucose    160 H  (74-99)  mg/dL


 


Calcium    10.0  (8.4-10.2)  mg/dL


 


Total Bilirubin    0.8  (0.2-1.3)  mg/dL


 


AST    30  (17-59)  U/L


 


ALT    17  (4-49)  U/L


 


Alkaline Phosphatase    42  ()  U/L


 


Total Protein    7.0  (6.3-8.2)  g/dL


 


Albumin    4.6  (3.5-5.0)  g/dL


 


Urine Color   Yellow   


 


Urine Appearance   Turbid   (Clear)  


 


Urine pH   5.0   (5.0-8.0)  


 


Ur Specific Gravity   1.021   (1.001-1.035)  


 


Urine Protein   1+ H   (Negative)  


 


Urine Glucose (UA)   Trace H   (Negative)  


 


Urine Ketones   Negative   (Negative)  


 


Urine Blood   Large H   (Negative)  


 


Urine Nitrite   Negative   (Negative)  


 


Urine Bilirubin   Negative   (Negative)  


 


Urine Urobilinogen   <2.0   (<2.0)  mg/dL


 


Ur Leukocyte Esterase   Negative   (Negative)  


 


Urine RBC   >182 H   (0-5)  /hpf


 


Urine WBC   13 H   (0-5)  /hpf


 


Urine Bacteria   Rare H   (None)  /hpf


 


Urine Mucus   Rare H   (None)  /hpf


 


Urine Yeast (Budding)   Few H   (None)  /hpf














Disposition


Clinical Impression: 


 Kidney stone on left side





Disposition: HOME SELF-CARE


Condition: Stable


Instructions (If sedation given, give patient instructions):  Kidney Stones (ED)


Additional Instructions: 


Please return to the Emergency Department if symptoms worsen or any other 

concerns.


Use medications as needed for pain and nausea. 


 Please follow-up with urology.


Prescriptions: 


Tamsulosin [Flomax] 0.4 mg PO DAILY #7 cap


Ketorolac [Toradol] 10 mg PO Q8HR #15 tab


Ondansetron Odt [Zofran Odt] 4 mg PO Q8HR PRN #10 tab


 PRN Reason: Nausea


Is patient prescribed a controlled substance at d/c from ED?: No


Referrals: 


Monica Myers MD [Primary Care Provider] - 1-2 days


Thomas Almonte MD [Family Provider] - 1-2 days


Time of Disposition: 16:57

## 2021-10-12 ENCOUNTER — HOSPITAL ENCOUNTER (INPATIENT)
Dept: HOSPITAL 47 - EC | Age: 71
LOS: 8 days | Discharge: NURSING, REHAB OR LONG-TERM CARE | DRG: 853 | End: 2021-10-20
Attending: INTERNAL MEDICINE | Admitting: INTERNAL MEDICINE
Payer: MEDICARE

## 2021-10-12 VITALS — BODY MASS INDEX: 30.9 KG/M2

## 2021-10-12 DIAGNOSIS — E87.2: ICD-10-CM

## 2021-10-12 DIAGNOSIS — A41.9: Primary | ICD-10-CM

## 2021-10-12 DIAGNOSIS — E11.22: ICD-10-CM

## 2021-10-12 DIAGNOSIS — Z82.49: ICD-10-CM

## 2021-10-12 DIAGNOSIS — F32.9: ICD-10-CM

## 2021-10-12 DIAGNOSIS — R65.21: ICD-10-CM

## 2021-10-12 DIAGNOSIS — Z79.899: ICD-10-CM

## 2021-10-12 DIAGNOSIS — N17.0: ICD-10-CM

## 2021-10-12 DIAGNOSIS — G93.41: ICD-10-CM

## 2021-10-12 DIAGNOSIS — Z79.84: ICD-10-CM

## 2021-10-12 DIAGNOSIS — Z20.822: ICD-10-CM

## 2021-10-12 DIAGNOSIS — E83.39: ICD-10-CM

## 2021-10-12 DIAGNOSIS — E83.51: ICD-10-CM

## 2021-10-12 DIAGNOSIS — E78.5: ICD-10-CM

## 2021-10-12 DIAGNOSIS — R57.1: ICD-10-CM

## 2021-10-12 DIAGNOSIS — E87.5: ICD-10-CM

## 2021-10-12 DIAGNOSIS — J45.909: ICD-10-CM

## 2021-10-12 DIAGNOSIS — N20.2: ICD-10-CM

## 2021-10-12 DIAGNOSIS — R27.8: ICD-10-CM

## 2021-10-12 DIAGNOSIS — I12.9: ICD-10-CM

## 2021-10-12 DIAGNOSIS — E87.6: ICD-10-CM

## 2021-10-12 DIAGNOSIS — E87.0: ICD-10-CM

## 2021-10-12 DIAGNOSIS — Z87.01: ICD-10-CM

## 2021-10-12 DIAGNOSIS — N18.9: ICD-10-CM

## 2021-10-12 DIAGNOSIS — N13.6: ICD-10-CM

## 2021-10-12 DIAGNOSIS — E86.0: ICD-10-CM

## 2021-10-12 DIAGNOSIS — Z87.442: ICD-10-CM

## 2021-10-12 DIAGNOSIS — Z86.16: ICD-10-CM

## 2021-10-12 LAB
ALBUMIN SERPL-MCNC: 4.4 G/DL (ref 3.5–5)
ALP SERPL-CCNC: 35 U/L (ref 38–126)
ALT SERPL-CCNC: 25 U/L (ref 4–49)
AMYLASE SERPL-CCNC: 142 U/L (ref 30–110)
ANION GAP SERPL CALC-SCNC: 38 MMOL/L
APTT BLD: 23.2 SEC (ref 22–30)
AST SERPL-CCNC: 39 U/L (ref 17–59)
BASOPHILS # BLD AUTO: 0.1 K/UL (ref 0–0.2)
BASOPHILS NFR BLD AUTO: 0 %
BUN SERPL-SCNC: 72 MG/DL (ref 9–20)
BUN SERPL-SCNC: 72 MG/DL (ref 9–20)
BUN SERPL-SCNC: 73 MG/DL (ref 9–20)
CALCIUM SPEC-MCNC: 7.9 MG/DL (ref 8.4–10.2)
CALCIUM SPEC-MCNC: 8.9 MG/DL (ref 8.4–10.2)
CALCIUM SPEC-MCNC: 9.4 MG/DL (ref 8.4–10.2)
CHLORIDE SERPL-SCNC: 100 MMOL/L (ref 98–107)
CHLORIDE SERPL-SCNC: 101 MMOL/L (ref 98–107)
CHLORIDE SERPL-SCNC: 102 MMOL/L (ref 98–107)
CO2 SERPL-SCNC: 6 MMOL/L (ref 22–30)
CO2 SERPL-SCNC: <5 MMOL/L (ref 22–30)
CO2 SERPL-SCNC: <5 MMOL/L (ref 22–30)
EOSINOPHIL # BLD AUTO: 0 K/UL (ref 0–0.7)
EOSINOPHIL NFR BLD AUTO: 0 %
ERYTHROCYTE [DISTWIDTH] IN BLOOD BY AUTOMATED COUNT: 3.56 M/UL (ref 4.3–5.9)
ERYTHROCYTE [DISTWIDTH] IN BLOOD: 12.1 % (ref 11.5–15.5)
GLUCOSE BLD-MCNC: 121 MG/DL (ref 75–99)
GLUCOSE BLD-MCNC: 209 MG/DL (ref 75–99)
GLUCOSE BLD-MCNC: 261 MG/DL (ref 75–99)
GLUCOSE BLD-MCNC: 374 MG/DL (ref 75–99)
GLUCOSE SERPL-MCNC: 103 MG/DL (ref 74–99)
GLUCOSE SERPL-MCNC: 197 MG/DL (ref 74–99)
GLUCOSE SERPL-MCNC: 249 MG/DL (ref 74–99)
HCT VFR BLD AUTO: 37.2 % (ref 39–53)
HGB BLD-MCNC: 11.5 GM/DL (ref 13–17.5)
INR PPP: 1 (ref ?–1.2)
KETONES UR QL STRIP.AUTO: (no result)
LIPASE SERPL-CCNC: 281 U/L (ref 23–300)
LYMPHOCYTES # SPEC AUTO: 1 K/UL (ref 1–4.8)
LYMPHOCYTES NFR SPEC AUTO: 7 %
MAGNESIUM SPEC-SCNC: 2.1 MG/DL (ref 1.6–2.3)
MCH RBC QN AUTO: 32.4 PG (ref 25–35)
MCHC RBC AUTO-ENTMCNC: 31 G/DL (ref 31–37)
MCV RBC AUTO: 104.4 FL (ref 80–100)
MONOCYTES # BLD AUTO: 0.6 K/UL (ref 0–1)
MONOCYTES NFR BLD AUTO: 4 %
NEUTROPHILS # BLD AUTO: 14.1 K/UL (ref 1.3–7.7)
NEUTROPHILS NFR BLD AUTO: 89 %
PH UR: 5.5 [PH] (ref 5–8)
PLATELET # BLD AUTO: 219 K/UL (ref 150–450)
POTASSIUM SERPL-SCNC: 5.1 MMOL/L (ref 3.5–5.1)
POTASSIUM SERPL-SCNC: 5.9 MMOL/L (ref 3.5–5.1)
POTASSIUM SERPL-SCNC: 6.7 MMOL/L (ref 3.5–5.1)
PROT SERPL-MCNC: 6.6 G/DL (ref 6.3–8.2)
PROT UR QL: (no result)
PT BLD: 10.5 SEC (ref 9–12)
RBC UR QL: >182 /HPF (ref 0–5)
SODIUM SERPL-SCNC: 144 MMOL/L (ref 137–145)
SODIUM SERPL-SCNC: 146 MMOL/L (ref 137–145)
SODIUM SERPL-SCNC: 150 MMOL/L (ref 137–145)
SP GR UR: 1.01 (ref 1–1.03)
SQUAMOUS UR QL AUTO: <1 /HPF (ref 0–4)
UROBILINOGEN UR QL STRIP: <2 MG/DL (ref ?–2)
WBC # BLD AUTO: 15.8 K/UL (ref 3.8–10.6)
WBC # UR AUTO: 95 /HPF (ref 0–5)

## 2021-10-12 PROCEDURE — 85730 THROMBOPLASTIN TIME PARTIAL: CPT

## 2021-10-12 PROCEDURE — 99291 CRITICAL CARE FIRST HOUR: CPT

## 2021-10-12 PROCEDURE — 96376 TX/PRO/DX INJ SAME DRUG ADON: CPT

## 2021-10-12 PROCEDURE — 83036 HEMOGLOBIN GLYCOSYLATED A1C: CPT

## 2021-10-12 PROCEDURE — 94640 AIRWAY INHALATION TREATMENT: CPT

## 2021-10-12 PROCEDURE — 84484 ASSAY OF TROPONIN QUANT: CPT

## 2021-10-12 PROCEDURE — 96375 TX/PRO/DX INJ NEW DRUG ADDON: CPT

## 2021-10-12 PROCEDURE — 80053 COMPREHEN METABOLIC PANEL: CPT

## 2021-10-12 PROCEDURE — 0T788DZ DILATION OF BILATERAL URETERS WITH INTRALUMINAL DEVICE, VIA NATURAL OR ARTIFICIAL OPENING ENDOSCOPIC: ICD-10-PCS

## 2021-10-12 PROCEDURE — 96374 THER/PROPH/DIAG INJ IV PUSH: CPT

## 2021-10-12 PROCEDURE — 81001 URINALYSIS AUTO W/SCOPE: CPT

## 2021-10-12 PROCEDURE — 96361 HYDRATE IV INFUSION ADD-ON: CPT

## 2021-10-12 PROCEDURE — 87635 SARS-COV-2 COVID-19 AMP PRB: CPT

## 2021-10-12 PROCEDURE — 87340 HEPATITIS B SURFACE AG IA: CPT

## 2021-10-12 PROCEDURE — 84100 ASSAY OF PHOSPHORUS: CPT

## 2021-10-12 PROCEDURE — 93005 ELECTROCARDIOGRAM TRACING: CPT

## 2021-10-12 PROCEDURE — 74230 X-RAY XM SWLNG FUNCJ C+: CPT

## 2021-10-12 PROCEDURE — 84145 PROCALCITONIN (PCT): CPT

## 2021-10-12 PROCEDURE — 71045 X-RAY EXAM CHEST 1 VIEW: CPT

## 2021-10-12 PROCEDURE — 87075 CULTR BACTERIA EXCEPT BLOOD: CPT

## 2021-10-12 PROCEDURE — 86704 HEP B CORE ANTIBODY TOTAL: CPT

## 2021-10-12 PROCEDURE — 87070 CULTURE OTHR SPECIMN AEROBIC: CPT

## 2021-10-12 PROCEDURE — 87040 BLOOD CULTURE FOR BACTERIA: CPT

## 2021-10-12 PROCEDURE — 82330 ASSAY OF CALCIUM: CPT

## 2021-10-12 PROCEDURE — 85025 COMPLETE CBC W/AUTO DIFF WBC: CPT

## 2021-10-12 PROCEDURE — 71046 X-RAY EXAM CHEST 2 VIEWS: CPT

## 2021-10-12 PROCEDURE — 87205 SMEAR GRAM STAIN: CPT

## 2021-10-12 PROCEDURE — 85610 PROTHROMBIN TIME: CPT

## 2021-10-12 PROCEDURE — 87086 URINE CULTURE/COLONY COUNT: CPT

## 2021-10-12 PROCEDURE — 83735 ASSAY OF MAGNESIUM: CPT

## 2021-10-12 PROCEDURE — 86706 HEP B SURFACE ANTIBODY: CPT

## 2021-10-12 PROCEDURE — 83605 ASSAY OF LACTIC ACID: CPT

## 2021-10-12 PROCEDURE — 90935 HEMODIALYSIS ONE EVALUATION: CPT

## 2021-10-12 PROCEDURE — 82150 ASSAY OF AMYLASE: CPT

## 2021-10-12 PROCEDURE — 94002 VENT MGMT INPAT INIT DAY: CPT

## 2021-10-12 PROCEDURE — 36415 COLL VENOUS BLD VENIPUNCTURE: CPT

## 2021-10-12 PROCEDURE — 74176 CT ABD & PELVIS W/O CONTRAST: CPT

## 2021-10-12 PROCEDURE — 83690 ASSAY OF LIPASE: CPT

## 2021-10-12 PROCEDURE — 82040 ASSAY OF SERUM ALBUMIN: CPT

## 2021-10-12 PROCEDURE — 80048 BASIC METABOLIC PNL TOTAL CA: CPT

## 2021-10-12 RX ADMIN — NOREPINEPHRINE BITARTRATE SCH MLS/HR: 1 INJECTION, SOLUTION, CONCENTRATE INTRAVENOUS at 16:45

## 2021-10-12 RX ADMIN — INSULIN ASPART SCH UNIT: 100 INJECTION, SOLUTION INTRAVENOUS; SUBCUTANEOUS at 23:01

## 2021-10-12 RX ADMIN — NOREPINEPHRINE BITARTRATE SCH MLS/HR: 1 INJECTION, SOLUTION, CONCENTRATE INTRAVENOUS at 21:30

## 2021-10-12 RX ADMIN — POTASSIUM CHLORIDE SCH MLS/HR: 14.9 INJECTION, SOLUTION INTRAVENOUS at 15:30

## 2021-10-12 RX ADMIN — POTASSIUM CHLORIDE SCH MLS/HR: 14.9 INJECTION, SOLUTION INTRAVENOUS at 21:39

## 2021-10-12 NOTE — P.HPIM
History of Present Illness


H&P Date: 10/12/21


Chief Complaint: bilateral back/flank pain





71-year-old man with medical history of diabetes, hypertension, hyperlipidemia, 

multiple recurrent kidney stones present for several days flank pain/back pain. 

Patient says a few weeks ago he started to experience colicky flank/back pain.  

The pain got more more severe.  He did present to the emergency room for further

evaluation of this and was sent home with pain medication after receiving some 

IV fluids.  However, he did not improve at home and was barely able to sit still

due to pain, which prompted his return to the emergency room.  Until that he has

not had any significant by mouth intake since the initiation of the pain, due to

also having associated nausea, vomiting with any by mouth intake.  Patient goes 

on to report generalized weakness, pain, shortness of breath.  He denies: 

Fevers, chills, chest pain, palpitations, syncope, presyncope, abdominal pain, 

diarrhea, constipation, dyschezia, numbness/weakness.





In the emergency room patient was afebrile, 114/54, heart rate 79, 99% on room 

air.  Lab work demonstrated leukocytosis to 15.8, hemoglobin of 11.5, platelets 

of 219.  Chemistries are concerning for acute renal failure with hyperkalemia, 

potassium of 6.7, bicarb of 6, creatinine of 11.6.  CT of the abdomen/pelvis and

showed bilateral hydronephrosis with likely passed stone on the right side with 

obstructing stone on the left side.





Review of Systems





All Systems reviewed and pertinent positives and negatives noted in HPI, all 

other symptoms are negative





Past Medical History


Past Medical History: Asthma, Diabetes Mellitus, Hyperlipidemia, Hypertension, 

Skin Disorder


Additional Past Medical History / Comment(s): hx heart murmer, kidney stones, 

umbilical hernia,


History of Any Multi-Drug Resistant Organisms: None Reported


Past Surgical History: Orthopedic Surgery


Additional Past Surgical History / Comment(s): rt knee surgery, mole removed 

from rt upper chest, rt shoulder-rotator cuff


Past Anesthesia/Blood Transfusion Reactions: No Reported Reaction


Past Psychological History: Depression


Smoking Status: Never smoker


Past Alcohol Use History: None Reported


Past Drug Use History: None Reported





- Past Family History


  ** Sister(s)


Family Medical History: Deep Vein Thrombosis (DVT)





Medications and Allergies


                                Home Medications











 Medication  Instructions  Recorded  Confirmed  Type


 


lisinopriL [Zestril] 10 mg PO QAM 12/16/16 10/12/21 History


 


Escitalopram Oxalate [Lexapro] 30 mg PO DAILY 01/23/19 10/12/21 History


 


Multivitamins, Thera [Multivitamin 1 tab PO DAILY 01/23/19 10/12/21 History





(formulary)]    


 


Gabapentin [Neurontin] 400 mg PO TID 10/16/20 10/12/21 History


 


Lovastatin [Mevacor] 40 mg PO DAILY 10/16/20 10/12/21 History


 


Ketorolac [Toradol] 10 mg PO Q8HR #15 tab 10/05/21 10/12/21 Rx


 


Ondansetron Odt [Zofran Odt] 4 mg PO Q8HR PRN #10 tab 10/05/21 10/12/21 Rx


 


Tamsulosin [Flomax] 0.4 mg PO DAILY #7 cap 10/05/21 10/12/21 Rx


 


metFORMIN HCL ER [Glucophage XR] 1,000 mg PO BID 10/05/21 10/12/21 History


 


Semaglutide [Ozempic] 0.5 mg SQ Q7D 10/12/21 10/12/21 History








                                    Allergies











Allergy/AdvReac Type Severity Reaction Status Date / Time


 


No Known Allergies Allergy   Verified 10/12/21 08:29














Physical Exam


Osteopathic Statement: *.  No significant issues noted on an osteopathic 

structural exam other than those noted in the History and Physical/Consult.


Vitals: 


                                   Vital Signs











  Temp Pulse Resp BP Pulse Ox


 


 10/12/21 12:57   74  18  94/40 


 


 10/12/21 11:30   91  24  93/34  97


 


 10/12/21 10:44   84  22  


 


 10/12/21 10:32   87  22  


 


 10/12/21 09:43   92  18  147/68  98


 


 10/12/21 06:58  97 F L  79  20  114/54  99








                                Intake and Output











 10/11/21 10/12/21 10/12/21





 22:59 06:59 14:59


 


Intake Total   300


 


Output Total   3


 


Balance   297


 


Intake:   


 


  IV   300


 


Output:   


 


  Estimated Blood Loss   3


 


Other:   


 


  Weight  92.986 kg 














Gen: awake, alert


HEENT: normocephalic, atraumatic, good hearing acuity, moist mucous membranes


Resp: good air exchange, breathing comfortably with no accessory muscle use


CVS: good distal perfusion x 4,


GI: soft, NTTP, ND


: no SPT, bilateral CVAT, herndon catheter is present


MSK: no pitting edema, no clubbing


Neuro: non-focal, moving all extremities


Psych: cooperative, euthymic mood





Results


CBC & Chem 7: 


                                 10/12/21 08:34





                                 10/12/21 11:45


Labs: 


                  Abnormal Lab Results - Last 24 Hours (Table)











  10/12/21 10/12/21 10/12/21 Range/Units





  07:40 08:34 08:34 


 


WBC   15.8 H   (3.8-10.6)  k/uL


 


RBC   3.56 L   (4.30-5.90)  m/uL


 


Hgb   11.5 L   (13.0-17.5)  gm/dL


 


Hct   37.2 L   (39.0-53.0)  %


 


MCV   104.4 H D   (80.0-100.0)  fL


 


Neutrophils #   14.1 H   (1.3-7.7)  k/uL


 


Sodium     (137-145)  mmol/L


 


Potassium    6.7 H*  (3.5-5.1)  mmol/L


 


Carbon Dioxide    6 L*  (22-30)  mmol/L


 


BUN    73 H  (9-20)  mg/dL


 


Creatinine    11.61 H*  (0.66-1.25)  mg/dL


 


Glucose    103 H  (74-99)  mg/dL


 


POC Glucose (mg/dL)  121 H    (75-99)  mg/dL


 


Plasma Lactic Acid Malick     (0.7-2.0)  mmol/L


 


Alkaline Phosphatase    35 L  ()  U/L


 


Amylase    142 H  ()  U/L














  10/12/21 10/12/21 10/12/21 Range/Units





  08:34 11:45 11:45 


 


WBC     (3.8-10.6)  k/uL


 


RBC     (4.30-5.90)  m/uL


 


Hgb     (13.0-17.5)  gm/dL


 


Hct     (39.0-53.0)  %


 


MCV     (80.0-100.0)  fL


 


Neutrophils #     (1.3-7.7)  k/uL


 


Sodium   150 H   (137-145)  mmol/L


 


Potassium     (3.5-5.1)  mmol/L


 


Carbon Dioxide   <5 L*   (22-30)  mmol/L


 


BUN   72 H   (9-20)  mg/dL


 


Creatinine   11.21 H*   (0.66-1.25)  mg/dL


 


Glucose   197 H   (74-99)  mg/dL


 


POC Glucose (mg/dL)     (75-99)  mg/dL


 


Plasma Lactic Acid Malick  14.6 H*   >24.0 H*  (0.7-2.0)  mmol/L


 


Alkaline Phosphatase     ()  U/L


 


Amylase     ()  U/L














Thrombosis Risk Factor Assmnt





- Choose All That Apply


Each Factor Represents 1 point: Age 41-60 years


Each Risk Factor Represents 2 Points: Age 61-74 years


Thrombosis Risk Factor Assessment Total Risk Factor Score: 3


Thrombosis Risk Factor Assessment Level: Moderate Risk





Assessment and Plan


Assessment: 





Bilateral hydronephrosis


Obstructive nephrolithiasis


Post renal Acute kidney injury


-Admit inpatient, ICU with Pulm consult


-Nephrology consult


-Urology consult stat for nephroureteral stent


-Pain control: Dilaudid when necessary with PCA


-Stool softener


-Nausea control


-IV fluids








Diabetes


Hypertension


Hyperlipidemia


-Home medications reviewed and reconciled 





Patient is full code


DVT prophylaxis with heparin 3 times a day

## 2021-10-12 NOTE — FL
EXAMINATION TYPE: FL guidance operating room

 

DATE OF EXAM: 10/12/2021

 

HISTORY: Fluoroscopy  time

 

Less than 60 seconds of fluoroscopy provided. 

 

IMPRESSION:

1. Fluoroscopy time.

## 2021-10-12 NOTE — XR
EXAMINATION TYPE: XR chest 2V

 

DATE OF EXAM: 10/12/2021

 

COMPARISON: NONE

 

TECHNIQUE: PA and lateral views submitted.

 

HISTORY: Weakness

 

FINDINGS:

The lungs are clear and  there is no pneumothorax, pleural effusion, or focal pneumonia.  Heart size 
is normal. There is interstitial pattern with atherosclerotic change aorta. Diffuse osteopenia and ar
thropathy of the shoulders. No pleural effusion or pneumothorax. Hypertrophic and degenerative change
 of the spine.

 

IMPRESSION: 

1. Correlate for interstitial pneumonitis or mild venous congestion..

## 2021-10-12 NOTE — P.GSCN
History of Present Illness


Consult date: 10/12/21


Reason for Consult: 





Bilateral hydronephrosis


History of present illness: 





This is a 71-year-old male with history of recurrent kidney stones.  He's 

admitted to the hospital with renal failure, for the past 2-3 days been having 

worsening fatigue, nausea and vomiting, and bilateral flank pain.  On 

presentation he underwent a CT abdomen and pelvis that showed evidence of 

bilateral hydronephrosis, with evidence of a left-sided distal ureteral calculi,

but no stones were seen along the right side of the ureter.  On presentation his

creatinine was 11.6.  His lactate is 14.6.  He's been afebrile, denies any 

dysuria or gross hematuria.  He's been anuric, and has not been able to provide 

him urine sample





Review of Systems





- Constitutional


Reports fatigue, Reports weakness, Denies chills, Denies fever





- EENT


Ears, nose, mouth and throat: Denies dysphagia





- Cardiovascular


Denies chest pain, Denies shortness of breath





- Respiratory


Reports dyspnea





- Gastrointestinal


Reports nausea, Reports vomiting





- Genitourinary


Reports flank pain





- Integumentary


Denies rash, Denies unusual bruising





- Neurological


Denies headaches, Denies syncope





Past Medical History


Past Medical History: Asthma, Diabetes Mellitus, Hyperlipidemia, Hypertension, 

Skin Disorder


Additional Past Medical History / Comment(s): hx heart murmer, kidney stones, u

mbilical hernia,


History of Any Multi-Drug Resistant Organisms: None Reported


Past Surgical History: Orthopedic Surgery


Additional Past Surgical History / Comment(s): rt knee surgery, mole removed 

from rt upper chest, rt shoulder-rotator cuff


Past Anesthesia/Blood Transfusion Reactions: No Reported Reaction


Past Psychological History: Depression


Smoking Status: Never smoker


Past Alcohol Use History: None Reported


Past Drug Use History: None Reported





- Past Family History


  ** Sister(s)


Family Medical History: Deep Vein Thrombosis (DVT)





Medications and Allergies


                                Home Medications











 Medication  Instructions  Recorded  Confirmed  Type


 


lisinopriL [Zestril] 10 mg PO QAM 12/16/16 10/12/21 History


 


Escitalopram Oxalate [Lexapro] 30 mg PO DAILY 01/23/19 10/12/21 History


 


Multivitamins, Thera [Multivitamin 1 tab PO DAILY 01/23/19 10/12/21 History





(formulary)]    


 


Gabapentin [Neurontin] 400 mg PO TID 10/16/20 10/12/21 History


 


Lovastatin [Mevacor] 40 mg PO DAILY 10/16/20 10/12/21 History


 


Ketorolac [Toradol] 10 mg PO Q8HR #15 tab 10/05/21 10/12/21 Rx


 


Ondansetron Odt [Zofran Odt] 4 mg PO Q8HR PRN #10 tab 10/05/21 10/12/21 Rx


 


Tamsulosin [Flomax] 0.4 mg PO DAILY #7 cap 10/05/21 10/12/21 Rx


 


metFORMIN HCL ER [Glucophage XR] 1,000 mg PO BID 10/05/21 10/12/21 History


 


Semaglutide [Ozempic] 0.5 mg SQ Q7D 10/12/21 10/12/21 History








                                    Allergies











Allergy/AdvReac Type Severity Reaction Status Date / Time


 


No Known Allergies Allergy   Verified 10/12/21 08:29














Surgical - Exam


                                   Vital Signs











Temp Pulse Resp BP Pulse Ox


 


 97 F L  79   20   114/54   99 


 


 10/12/21 06:58  10/12/21 06:58  10/12/21 06:58  10/12/21 06:58  10/12/21 06:58














- General


severe distress, moderate pain





- Eyes


PERRL, normal ocular movement





- ENT


normal nares, normal mucosa





- Respiratory





Labored breathing





- Abdomen


Abdomen: soft, non tender





- Psychiatric


oriented to time, oriented to person, oriented to place





Results





- Labs





                                 10/12/21 08:34





                                 10/12/21 11:45


                  Abnormal Lab Results - Last 24 Hours (Table)











  10/12/21 10/12/21 10/12/21 Range/Units





  07:40 08:34 08:34 


 


WBC   15.8 H   (3.8-10.6)  k/uL


 


RBC   3.56 L   (4.30-5.90)  m/uL


 


Hgb   11.5 L   (13.0-17.5)  gm/dL


 


Hct   37.2 L   (39.0-53.0)  %


 


MCV   104.4 H D   (80.0-100.0)  fL


 


Neutrophils #   14.1 H   (1.3-7.7)  k/uL


 


Sodium     (137-145)  mmol/L


 


Potassium    6.7 H*  (3.5-5.1)  mmol/L


 


Carbon Dioxide    6 L*  (22-30)  mmol/L


 


BUN    73 H  (9-20)  mg/dL


 


Creatinine    11.61 H*  (0.66-1.25)  mg/dL


 


Glucose    103 H  (74-99)  mg/dL


 


POC Glucose (mg/dL)  121 H    (75-99)  mg/dL


 


Plasma Lactic Acid Malick     (0.7-2.0)  mmol/L


 


Alkaline Phosphatase    35 L  ()  U/L


 


Amylase    142 H  ()  U/L














  10/12/21 10/12/21 Range/Units





  08:34 11:45 


 


WBC    (3.8-10.6)  k/uL


 


RBC    (4.30-5.90)  m/uL


 


Hgb    (13.0-17.5)  gm/dL


 


Hct    (39.0-53.0)  %


 


MCV    (80.0-100.0)  fL


 


Neutrophils #    (1.3-7.7)  k/uL


 


Sodium   150 H  (137-145)  mmol/L


 


Potassium    (3.5-5.1)  mmol/L


 


Carbon Dioxide    (22-30)  mmol/L


 


BUN   72 H  (9-20)  mg/dL


 


Creatinine    (0.66-1.25)  mg/dL


 


Glucose    (74-99)  mg/dL


 


POC Glucose (mg/dL)    (75-99)  mg/dL


 


Plasma Lactic Acid Malick  14.6 H*   (0.7-2.0)  mmol/L


 


Alkaline Phosphatase    ()  U/L


 


Amylase    ()  U/L








                                 Diabetes panel











  10/12/21 10/12/21 Range/Units





  08:34 11:45 


 


Sodium  144  150 H  (137-145)  mmol/L


 


Potassium  6.7 H*  5.1  (3.5-5.1)  mmol/L


 


Chloride  100   ()  mmol/L


 


Carbon Dioxide  6 L*   (22-30)  mmol/L


 


BUN  73 H  72 H  (9-20)  mg/dL


 


Creatinine  11.61 H*   (0.66-1.25)  mg/dL


 


Glucose  103 H   (74-99)  mg/dL


 


Calcium  9.4  8.9  (8.4-10.2)  mg/dL


 


AST  39   (17-59)  U/L


 


ALT  25   (4-49)  U/L


 


Alkaline Phosphatase  35 L   ()  U/L


 


Total Protein  6.6   (6.3-8.2)  g/dL


 


Albumin  4.4   (3.5-5.0)  g/dL








                                  Calcium panel











  10/12/21 10/12/21 Range/Units





  08:34 11:45 


 


Calcium  9.4  8.9  (8.4-10.2)  mg/dL


 


Albumin  4.4   (3.5-5.0)  g/dL








                                 Pituitary panel











  10/12/21 10/12/21 Range/Units





  08:34 11:45 


 


Sodium  144  150 H  (137-145)  mmol/L


 


Potassium  6.7 H*  5.1  (3.5-5.1)  mmol/L


 


Chloride  100   ()  mmol/L


 


Carbon Dioxide  6 L*   (22-30)  mmol/L


 


BUN  73 H  72 H  (9-20)  mg/dL


 


Creatinine  11.61 H*   (0.66-1.25)  mg/dL


 


Glucose  103 H   (74-99)  mg/dL


 


Calcium  9.4  8.9  (8.4-10.2)  mg/dL








                                  Adrenal panel











  10/12/21 10/12/21 Range/Units





  08:34 11:45 


 


Sodium  144  150 H  (137-145)  mmol/L


 


Potassium  6.7 H*  5.1  (3.5-5.1)  mmol/L


 


Chloride  100   ()  mmol/L


 


Carbon Dioxide  6 L*   (22-30)  mmol/L


 


BUN  73 H  72 H  (9-20)  mg/dL


 


Creatinine  11.61 H*   (0.66-1.25)  mg/dL


 


Glucose  103 H   (74-99)  mg/dL


 


Calcium  9.4  8.9  (8.4-10.2)  mg/dL


 


Total Bilirubin  0.5   (0.2-1.3)  mg/dL


 


AST  39   (17-59)  U/L


 


ALT  25   (4-49)  U/L


 


Alkaline Phosphatase  35 L   ()  U/L


 


Total Protein  6.6   (6.3-8.2)  g/dL


 


Albumin  4.4   (3.5-5.0)  g/dL














- Imaging


CT scan - abdomen: image reviewed (6 mm left-sided ureteral stone, an additional

stone within the bladder.  Right-sided hydronephrosis, no ureteral stones 

appreciated)





Assessment and Plan


Assessment: 





71-year-old male admitted to the hospital with renal failure, creatinine on 

presentation is 11.6.  He is hemodynamically stable, his lactate is 14.6 

Discussed given his renal failure , the presence of bilateral hydronephrosis and

obstructive left-sided ureteral stone a recommend proceeding with bilateral 

ureteral stent placement.  Discussed this will relieve his renal obstruction 

from his kidney stone, discussed the right-sided hydronephrosis is  consistent 

with a recently passed stone, but given his renal failure recommend proceeding 

with stent placement on that side.  Discussed the risk and benefit of surgery 

with him and his wife in detail


-OR for bilateral stent placement

## 2021-10-12 NOTE — P.OP
Date of Procedure: 10/12/21


Preoperative Diagnosis: 


bilateral hydronephrosis 


Postoperative Diagnosis: 


Same


Procedure(s) Performed: 


Cystoscopy and bilateral ureteral stent placement


Implants: 


4.8-Nicar[place]uan by 26 cm stent bilaterally


Anesthesia: MORA


Surgeon: Thomas Almonte


Urine output (ml): 1


Pathology: none sent


Condition: stable


Disposition: PACU


Indications for Procedure: 


11-year-old male admitted to the hospital with renal failure, creatinine on 

presentation is 11.6.  He is hemodynamically stable, his lactate is 14.6 

Discussed given his renal failure , the presence of bilateral hydronephrosis and

obstructive left-sided ureteral stone a recommend proceeding with bilateral ur

eteral stent placement.  Discussed this will relieve his renal obstruction from 

his kidney stone, discussed the right-sided hydronephrosis is  consistent with a

recently passed stone, but given his renal failure recommend proceeding with 

stent placement on that side.  Discussed the risk and benefit of surgery with 

him and his wife in detail


Description of Procedure: 


Patient was brought to the operating room, general anesthesia was induced.  He 

was prepped and draped so fashion a placement dorsal lithotomy position.  

Cystoscopy fitted 21-[place]icar[place] sheath was inserted per urethra, attention was then 

carried to the right ureteral orifice which was intubated sensor wire.  Next a 

ureteral stent was passed over the wire, the proximal curl was visualized on 

fluoroscopy and the distal curl was visualized and cystoscope.  Attention was 

then carried to the left ureteral orifice, the stone was partially seen in the 

UVJ.  Excellent was able to pass a wire past the stone.  Ureter catheter was 

passed over the wire, cloudy urine was obtained and sent for culture.  Next the 

catheter was removed the wire in place.  Next a ureteral stent was passed over 

the wire, the proximal curl was visualized on fluoroscopy and the distal curl 

was visualized using the cystoscope.  The bladder was emptied and the case.  A 

16-Nicar[place]uan Gibson was placed with return of clear urine.  Patient was taken to 

recovery in stable condition

## 2021-10-12 NOTE — CT
EXAMINATION TYPE: CT abdomen pelvis wo con

 

DATE OF EXAM: 10/12/2021

 

HISTORY: Kidney stones/anuric

 

CT DLP: 776.6 mGycm.  Automated Exposure Control for Dose Reduction was Utilized.

 

TECHNIQUE:  CT scan of the abdomen and pelvis is performed without oral or IV contrast.

 

COMPARISON: CT abdomen and pelvis one week ago

 

FINDINGS:  Within the limitations of a non-contrast study, the following observations are made.

 

LUNG BASES: Calcification at level of aortic valve redemonstrated.

 

LIVER/GB: Visualized liver heterogeneous hypodense relative to spleen consistent with mild diffuse fa
tty infiltration.

 

PANCREAS: No significant abnormality is seen.

 

SPLEEN: No significant abnormality is seen.

 

ADRENALS: No significant abnormality is seen.

 

KIDNEYS: Stable 2 mm nonobstructing calculus right kidney upper pole level coronal image 59. Interval
 passage of lower pole right renal calculi into the bladder with mild right-sided hydronephrosis. New
 mild fat stranding surrounding the right kidney

 

Approximately 5-6 small scattered calculi throughout the left kidney redemonstrated. There are now 2-
3 adjacent calculi in distal left ureter causing persistent moderate left-sided hydronephrosis measur
ing up to 6 mm in size. There is new dependent 5 mm calculus in urinary bladder axial image 146.

 

BOWEL: Scattered colonic diverticula. No suspicious small or large bowel dilatation. Suboptimal evalu
ation without enteric contrast.

 

GENITAL ORGANS: No gross abnormality seen.

 

LYMPH NODES: No greater than 1cm abdominal or pelvic lymph nodes are appreciated.

 

OSSEOUS STRUCTURES: Transitional-type vertebra at lumbosacral junction redemonstrated.

 

OTHER: No significant additional abnormality is seen.

 

IMPRESSION: New mild right-sided hydronephrosis and right perinephric fat stranding with interval pas
primo of small lower pole right renal calculi into the bladder. Persistent moderate left-sided hydrone
phrosis due to obstructing distal ureter calculi.

## 2021-10-12 NOTE — ED
General Adult HPI





- General


Chief complaint: Nausea/Vomiting/Diarrhea


Stated complaint: Vomiting, kidney stone


Time Seen by Provider: 10/12/21 07:05


Source: patient, RN notes reviewed, old records reviewed


Mode of arrival: ambulatory


Limitations: no limitations





- History of Present Illness


Initial comments: 





71-year-old male presenting with generalized weakness fatigue, nausea vomiting. 

The majority of his symptoms have been present for several months.  He has not 

been doing well since a diagnosis of coronavirus in the spring.  He has been 

dealing with kidney stones following with urology.  He states he's had increased

nausea vomiting and has not urinated in the past 24 hours.





- Related Data


                                Home Medications











 Medication  Instructions  Recorded  Confirmed


 


lisinopriL [Zestril] 10 mg PO QAM 12/16/16 10/12/21


 


Escitalopram Oxalate [Lexapro] 30 mg PO DAILY 01/23/19 10/12/21


 


Multivitamins, Thera [Multivitamin 1 tab PO DAILY 01/23/19 10/12/21





(formulary)]   


 


Gabapentin [Neurontin] 400 mg PO TID 10/16/20 10/12/21


 


Lovastatin [Mevacor] 40 mg PO DAILY 10/16/20 10/12/21


 


metFORMIN HCL ER [Glucophage XR] 1,000 mg PO BID 10/05/21 10/12/21


 


Semaglutide [Ozempic] 0.5 mg SQ Q7D 10/12/21 10/12/21








                                  Previous Rx's











 Medication  Instructions  Recorded


 


Ketorolac [Toradol] 10 mg PO Q8HR #15 tab 10/05/21


 


Ondansetron Odt [Zofran Odt] 4 mg PO Q8HR PRN #10 tab 10/05/21


 


Tamsulosin [Flomax] 0.4 mg PO DAILY #7 cap 10/05/21











                                    Allergies











Allergy/AdvReac Type Severity Reaction Status Date / Time


 


No Known Allergies Allergy   Verified 10/12/21 08:29














Review of Systems


ROS Statement: 


Those systems with pertinent positive or pertinent negative responses have been 

documented in the HPI.





ROS Other: All systems not noted in ROS Statement are negative.





Past Medical History


Past Medical History: Asthma, Diabetes Mellitus, Hyperlipidemia, Hypertension, 

Skin Disorder


Additional Past Medical History / Comment(s): hx heart murmer, kidney stones, 

umbilical hernia,


History of Any Multi-Drug Resistant Organisms: None Reported


Past Surgical History: Orthopedic Surgery


Additional Past Surgical History / Comment(s): rt knee surgery, mole removed 

from rt upper chest, rt shoulder-rotator cuff


Past Anesthesia/Blood Transfusion Reactions: No Reported Reaction


Past Psychological History: Depression


Smoking Status: Never smoker


Past Alcohol Use History: None Reported


Past Drug Use History: None Reported





- Past Family History


  ** Sister(s)


Family Medical History: Deep Vein Thrombosis (DVT)





General Exam


Limitations: no limitations


General appearance: alert, in no apparent distress


Head exam: Present: atraumatic, normocephalic


Eye exam: Present: normal appearance, PERRL


ENT exam: Present: mucous membranes dry


Respiratory exam: Present: respiratory distress (Patient is tachypneic).  

Absent: wheezes, rales, rhonchi


Cardiovascular Exam: Present: regular rate, normal rhythm


GI/Abdominal exam: Present: soft.  Absent: distended, tenderness, guarding


Extremities exam: Present: normal inspection, normal capillary refill.  Absent: 

pedal edema, calf tenderness


Neurological exam: Present: alert, oriented X3, CN II-XII intact


Psychiatric exam: Present: normal affect, normal mood


Skin exam: Present: warm, dry, intact.  Absent: cyanosis, diaphoretic





Course


                                   Vital Signs











  10/12/21 10/12/21 10/12/21





  06:58 09:43 10:32


 


Temperature 97 F L  


 


Pulse Rate 79 92 87


 


Respiratory 20 18 22





Rate   


 


Blood Pressure 114/54 147/68 


 


O2 Sat by Pulse 99 98 





Oximetry   














- Reevaluation(s)


Reevaluation #1: 





10/12/21 10:52


Case discussed with Dr. Pavon, Dr. Almonte, and Dr. Burgos








Patient will be kept nothing by mouth for possible renal stents.  I discussed 

case with nephrology recommends a total of 4 Amps of sodium bicarb IV push 

followed by 150 mL an hour of 150 mEq of sodium bicarb infusion.





EKG Findings





- EKG Comments:


EKG Findings:: EKG: Normal sinus rhythm with sinus arrhythmia, prolonged QT, 

rate of 95, NM interval 160, QRS duration 84, , no ST segment elevation





Medical Decision Making





- Medical Decision Making





71-year-old male presenting with nausea vomiting, decreased urine output.  

Profound dehydration.  IV hydration is initiated, laboratory testing reveals 

significant abnormalities.








Patient found to be in acute renal failure, lactic acid is extremely high.  He 

has a potassium of 6.7.  Profound lab abnormalities.  I discussed case with the 

admitting physician, the nephrologist, the intensive care physician and the 

urologist.











Patient will be kept nothing by mouth for possible renal stents.  Gibson catheter

will be inserted.  He will receive continuous IV hydration with sodium bicarb 

infusion for his acidosis.  Repeat electrolytes and lactic acid are pending.





- Lab Data


Result diagrams: 


                                 10/12/21 08:34





                                 10/12/21 08:34


                                   Lab Results











  10/12/21 10/12/21 10/12/21 Range/Units





  07:40 08:34 08:34 


 


WBC   15.8 H   (3.8-10.6)  k/uL


 


RBC   3.56 L   (4.30-5.90)  m/uL


 


Hgb   11.5 L   (13.0-17.5)  gm/dL


 


Hct   37.2 L   (39.0-53.0)  %


 


MCV   104.4 H D   (80.0-100.0)  fL


 


MCH   32.4   (25.0-35.0)  pg


 


MCHC   31.0   (31.0-37.0)  g/dL


 


RDW   12.1   (11.5-15.5)  %


 


Plt Count   219   (150-450)  k/uL


 


MPV   8.4   


 


Neutrophils %   89   %


 


Lymphocytes %   7   %


 


Monocytes %   4   %


 


Eosinophils %   0   %


 


Basophils %   0   %


 


Neutrophils #   14.1 H   (1.3-7.7)  k/uL


 


Lymphocytes #   1.0   (1.0-4.8)  k/uL


 


Monocytes #   0.6   (0-1.0)  k/uL


 


Eosinophils #   0.0   (0-0.7)  k/uL


 


Basophils #   0.1   (0-0.2)  k/uL


 


Hypochromasia   Slight   


 


Macrocytosis   Slight   


 


PT    10.5  (9.0-12.0)  sec


 


INR    1.0  (<1.2)  


 


APTT    23.2  (22.0-30.0)  sec


 


Sodium     (137-145)  mmol/L


 


Potassium     (3.5-5.1)  mmol/L


 


Chloride     ()  mmol/L


 


Carbon Dioxide     (22-30)  mmol/L


 


Anion Gap     mmol/L


 


BUN     (9-20)  mg/dL


 


Creatinine     (0.66-1.25)  mg/dL


 


Est GFR (CKD-EPI)AfAm     (>60 ml/min/1.73 sqM)  


 


Est GFR (CKD-EPI)NonAf     (>60 ml/min/1.73 sqM)  


 


Glucose     (74-99)  mg/dL


 


POC Glucose (mg/dL)  121 H    (75-99)  mg/dL


 


POC Glu Operater ID  Donovan Mayer    


 


Plasma Lactic Acid Malick     (0.7-2.0)  mmol/L


 


Calcium     (8.4-10.2)  mg/dL


 


Total Bilirubin     (0.2-1.3)  mg/dL


 


AST     (17-59)  U/L


 


ALT     (4-49)  U/L


 


Alkaline Phosphatase     ()  U/L


 


Troponin I     (0.000-0.034)  ng/mL


 


Total Protein     (6.3-8.2)  g/dL


 


Albumin     (3.5-5.0)  g/dL


 


Amylase     ()  U/L


 


Lipase     ()  U/L














  10/12/21 10/12/21 10/12/21 Range/Units





  08:34 08:34 08:34 


 


WBC     (3.8-10.6)  k/uL


 


RBC     (4.30-5.90)  m/uL


 


Hgb     (13.0-17.5)  gm/dL


 


Hct     (39.0-53.0)  %


 


MCV     (80.0-100.0)  fL


 


MCH     (25.0-35.0)  pg


 


MCHC     (31.0-37.0)  g/dL


 


RDW     (11.5-15.5)  %


 


Plt Count     (150-450)  k/uL


 


MPV     


 


Neutrophils %     %


 


Lymphocytes %     %


 


Monocytes %     %


 


Eosinophils %     %


 


Basophils %     %


 


Neutrophils #     (1.3-7.7)  k/uL


 


Lymphocytes #     (1.0-4.8)  k/uL


 


Monocytes #     (0-1.0)  k/uL


 


Eosinophils #     (0-0.7)  k/uL


 


Basophils #     (0-0.2)  k/uL


 


Hypochromasia     


 


Macrocytosis     


 


PT     (9.0-12.0)  sec


 


INR     (<1.2)  


 


APTT     (22.0-30.0)  sec


 


Sodium  144    (137-145)  mmol/L


 


Potassium  6.7 H*    (3.5-5.1)  mmol/L


 


Chloride  100    ()  mmol/L


 


Carbon Dioxide  6 L*    (22-30)  mmol/L


 


Anion Gap  38    mmol/L


 


BUN  73 H    (9-20)  mg/dL


 


Creatinine  11.61 H*    (0.66-1.25)  mg/dL


 


Est GFR (CKD-EPI)AfAm  5    (>60 ml/min/1.73 sqM)  


 


Est GFR (CKD-EPI)NonAf  4    (>60 ml/min/1.73 sqM)  


 


Glucose  103 H    (74-99)  mg/dL


 


POC Glucose (mg/dL)     (75-99)  mg/dL


 


POC Glu Operater ID     


 


Plasma Lactic Acid Malick   14.6 H*   (0.7-2.0)  mmol/L


 


Calcium  9.4    (8.4-10.2)  mg/dL


 


Total Bilirubin  0.5    (0.2-1.3)  mg/dL


 


AST  39    (17-59)  U/L


 


ALT  25    (4-49)  U/L


 


Alkaline Phosphatase  35 L    ()  U/L


 


Troponin I    0.013  (0.000-0.034)  ng/mL


 


Total Protein  6.6    (6.3-8.2)  g/dL


 


Albumin  4.4    (3.5-5.0)  g/dL


 


Amylase  142 H    ()  U/L


 


Lipase  281    ()  U/L














Critical Care Time


Critical Care Time: Yes


Total Critical Care Time: 35





Disposition


Clinical Impression: 


 Dehydration, Lactic acidosis, Acute renal failure





Disposition: ADMITTED AS IP TO THIS [facility]


Condition: Serious


Is patient prescribed a controlled substance at d/c from ED?: No


Referrals: 


Monica Myers MD [Primary Care Provider] - 1-2 days


Decision to Admit Reason: Admit from EC


Decision Date: 10/12/21


Decision Time: 10:54

## 2021-10-13 LAB
ALBUMIN SERPL-MCNC: 3.7 G/DL (ref 3.5–5)
ALP SERPL-CCNC: 32 U/L (ref 38–126)
ALT SERPL-CCNC: 33 U/L (ref 4–49)
ANION GAP SERPL CALC-SCNC: 23 MMOL/L
ANION GAP SERPL CALC-SCNC: 30 MMOL/L
ANION GAP SERPL CALC-SCNC: 40 MMOL/L
ANION GAP SERPL CALC-SCNC: 40 MMOL/L
AST SERPL-CCNC: 79 U/L (ref 17–59)
BASOPHILS # BLD AUTO: 0.1 K/UL (ref 0–0.2)
BASOPHILS NFR BLD AUTO: 0 %
BUN SERPL-SCNC: 75 MG/DL (ref 9–20)
BUN SERPL-SCNC: 81 MG/DL (ref 9–20)
BUN SERPL-SCNC: 91 MG/DL (ref 9–20)
BUN SERPL-SCNC: 96 MG/DL (ref 9–20)
CALCIUM SPEC-MCNC: 6.7 MG/DL (ref 8.4–10.2)
CALCIUM SPEC-MCNC: 6.9 MG/DL (ref 8.4–10.2)
CALCIUM SPEC-MCNC: 7.1 MG/DL (ref 8.4–10.2)
CALCIUM SPEC-MCNC: 7.6 MG/DL (ref 8.4–10.2)
CELLS COUNTED: 200
CHLORIDE SERPL-SCNC: 94 MMOL/L (ref 98–107)
CHLORIDE SERPL-SCNC: 95 MMOL/L (ref 98–107)
CHLORIDE SERPL-SCNC: 97 MMOL/L (ref 98–107)
CHLORIDE SERPL-SCNC: 99 MMOL/L (ref 98–107)
CO2 SERPL-SCNC: 11 MMOL/L (ref 22–30)
CO2 SERPL-SCNC: 22 MMOL/L (ref 22–30)
CO2 SERPL-SCNC: 29 MMOL/L (ref 22–30)
CO2 SERPL-SCNC: 6 MMOL/L (ref 22–30)
EOSINOPHIL # BLD AUTO: 0 K/UL (ref 0–0.7)
EOSINOPHIL # BLD MANUAL: 0.31 K/UL (ref 0–0.7)
EOSINOPHIL NFR BLD AUTO: 0 %
ERYTHROCYTE [DISTWIDTH] IN BLOOD BY AUTOMATED COUNT: 2.94 M/UL (ref 4.3–5.9)
ERYTHROCYTE [DISTWIDTH] IN BLOOD BY AUTOMATED COUNT: 3.36 M/UL (ref 4.3–5.9)
ERYTHROCYTE [DISTWIDTH] IN BLOOD: 12.4 % (ref 11.5–15.5)
ERYTHROCYTE [DISTWIDTH] IN BLOOD: 12.9 % (ref 11.5–15.5)
GLUCOSE BLD-MCNC: 208 MG/DL (ref 75–99)
GLUCOSE BLD-MCNC: 323 MG/DL (ref 75–99)
GLUCOSE BLD-MCNC: 382 MG/DL (ref 75–99)
GLUCOSE SERPL-MCNC: 174 MG/DL (ref 74–99)
GLUCOSE SERPL-MCNC: 300 MG/DL (ref 74–99)
GLUCOSE SERPL-MCNC: 348 MG/DL (ref 74–99)
GLUCOSE SERPL-MCNC: 375 MG/DL (ref 74–99)
HCT VFR BLD AUTO: 30.4 % (ref 39–53)
HCT VFR BLD AUTO: 36.9 % (ref 39–53)
HGB BLD-MCNC: 11 GM/DL (ref 13–17.5)
HGB BLD-MCNC: 9.7 GM/DL (ref 13–17.5)
LYMPHOCYTES # BLD MANUAL: 3.66 K/UL (ref 1–4.8)
LYMPHOCYTES # SPEC AUTO: 1.2 K/UL (ref 1–4.8)
LYMPHOCYTES NFR SPEC AUTO: 6 %
MAGNESIUM SPEC-SCNC: 1.6 MG/DL (ref 1.6–2.3)
MAGNESIUM SPEC-SCNC: 1.8 MG/DL (ref 1.6–2.3)
MCH RBC QN AUTO: 32.7 PG (ref 25–35)
MCH RBC QN AUTO: 32.9 PG (ref 25–35)
MCHC RBC AUTO-ENTMCNC: 29.8 G/DL (ref 31–37)
MCHC RBC AUTO-ENTMCNC: 31.9 G/DL (ref 31–37)
MCV RBC AUTO: 103.4 FL (ref 80–100)
MCV RBC AUTO: 109.7 FL (ref 80–100)
METAMYELOCYTES # BLD: 0.61 K/UL
MONOCYTES # BLD AUTO: 1.4 K/UL (ref 0–1)
MONOCYTES # BLD MANUAL: 0.92 K/UL (ref 0–1)
MONOCYTES NFR BLD AUTO: 7 %
NEUTROPHILS # BLD AUTO: 16.7 K/UL (ref 1.3–7.7)
NEUTROPHILS NFR BLD AUTO: 85 %
NEUTROPHILS NFR BLD MANUAL: 62 %
NEUTS SEG # BLD MANUAL: 25.3 K/UL (ref 1.3–7.7)
PLATELET # BLD AUTO: 215 K/UL (ref 150–450)
PLATELET # BLD AUTO: 283 K/UL (ref 150–450)
POTASSIUM SERPL-SCNC: 4.2 MMOL/L (ref 3.5–5.1)
POTASSIUM SERPL-SCNC: 4.7 MMOL/L (ref 3.5–5.1)
POTASSIUM SERPL-SCNC: 5.4 MMOL/L (ref 3.5–5.1)
POTASSIUM SERPL-SCNC: 6.2 MMOL/L (ref 3.5–5.1)
PROT SERPL-MCNC: 5.4 G/DL (ref 6.3–8.2)
SODIUM SERPL-SCNC: 145 MMOL/L (ref 137–145)
SODIUM SERPL-SCNC: 146 MMOL/L (ref 137–145)
SODIUM SERPL-SCNC: 147 MMOL/L (ref 137–145)
SODIUM SERPL-SCNC: 148 MMOL/L (ref 137–145)
WBC # BLD AUTO: 19.6 K/UL (ref 3.8–10.6)
WBC # BLD AUTO: 30.5 K/UL (ref 3.8–10.6)

## 2021-10-13 PROCEDURE — 3E033XZ INTRODUCTION OF VASOPRESSOR INTO PERIPHERAL VEIN, PERCUTANEOUS APPROACH: ICD-10-PCS

## 2021-10-13 RX ADMIN — MAGNESIUM SULFATE IN DEXTROSE SCH MLS/HR: 10 INJECTION, SOLUTION INTRAVENOUS at 10:40

## 2021-10-13 RX ADMIN — NOREPINEPHRINE BITARTRATE SCH MLS/HR: 1 INJECTION, SOLUTION, CONCENTRATE INTRAVENOUS at 07:01

## 2021-10-13 RX ADMIN — POTASSIUM CHLORIDE SCH: 14.9 INJECTION, SOLUTION INTRAVENOUS at 21:15

## 2021-10-13 RX ADMIN — NOREPINEPHRINE BITARTRATE SCH MLS/HR: 1 INJECTION, SOLUTION, CONCENTRATE INTRAVENOUS at 02:13

## 2021-10-13 RX ADMIN — INSULIN ASPART SCH UNIT: 100 INJECTION, SOLUTION INTRAVENOUS; SUBCUTANEOUS at 12:08

## 2021-10-13 RX ADMIN — SODIUM BICARBONATE SCH MLS/HR: 84 INJECTION, SOLUTION INTRAVENOUS at 21:34

## 2021-10-13 RX ADMIN — INSULIN DETEMIR SCH UNIT: 100 INJECTION, SOLUTION SUBCUTANEOUS at 12:08

## 2021-10-13 RX ADMIN — INSULIN DETEMIR SCH UNIT: 100 INJECTION, SOLUTION SUBCUTANEOUS at 20:41

## 2021-10-13 RX ADMIN — INSULIN ASPART SCH UNIT: 100 INJECTION, SOLUTION INTRAVENOUS; SUBCUTANEOUS at 06:37

## 2021-10-13 RX ADMIN — NOREPINEPHRINE BITARTRATE SCH MLS/HR: 1 INJECTION, SOLUTION, CONCENTRATE INTRAVENOUS at 02:55

## 2021-10-13 RX ADMIN — NOREPINEPHRINE BITARTRATE SCH MLS/HR: 1 INJECTION, SOLUTION, CONCENTRATE INTRAVENOUS at 20:41

## 2021-10-13 RX ADMIN — INSULIN ASPART SCH UNIT: 100 INJECTION, SOLUTION INTRAVENOUS; SUBCUTANEOUS at 17:20

## 2021-10-13 RX ADMIN — POTASSIUM CHLORIDE SCH MLS/HR: 14.9 INJECTION, SOLUTION INTRAVENOUS at 03:06

## 2021-10-13 RX ADMIN — MAGNESIUM SULFATE IN DEXTROSE SCH MLS/HR: 10 INJECTION, SOLUTION INTRAVENOUS at 06:43

## 2021-10-13 RX ADMIN — POTASSIUM CHLORIDE SCH MLS/HR: 14.9 INJECTION, SOLUTION INTRAVENOUS at 12:11

## 2021-10-13 NOTE — P.PN
Subjective


Progress Note Date: 10/13/21





Patient is doing better, extubated in the ICU following bilateral nephroureteral

stent placement.  Creatinine is modestly improved, lactic acid is coming down, 

urine output is increasing.  Potassium is still elevated, nephrology is 

following for consideration of renal replacement therapy if indicated, but will 

continue to follow at this time as urine output is improving.  Patient is a 

little bit groggy from sedation, pain medication.  93% on 2 L nasal cannula.  

White blood cell count is improving.  Patient is still requiring levo fed, 0.15 

mics





Objective





- Vital Signs


Vital signs: 


                                   Vital Signs











Temp  97.9 F   10/13/21 04:00


 


Pulse  96   10/13/21 07:00


 


Resp  16   10/13/21 07:00


 


BP  132/64   10/13/21 07:00


 


Pulse Ox  92 L  10/13/21 07:00








                                 Intake & Output











 10/12/21 10/13/21 10/13/21





 18:59 06:59 18:59


 


Intake Total 3853.626 2643.199 176.597


 


Output Total 8 155 75


 


Balance 3845.626 2488.199 101.597


 


Weight 97.7 kg 98.2 kg 


 


Intake:   


 


  IV 3750 2020 170


 


    0.9 @20mls/hr  220 20


 


    Dextrose 5% in Water 1, 450 1800 150





    000 ml @ 150 mls/hr IV .   





    Q7H40M BOUCHRA with Sodium   





    Bicarb (1 Meq/ml) 150 ml   





    Rx#:[id number]   


 


    Sodium Chloride 0.9% 3, 3000  





    000 ml @ 999 mls/hr IV .   





    Q3H1M ONE Rx#:[id number]   


 


  Intake, IV Titration 103.626 623.199 6.597





  Amount   


 


    Norepinephrine 4 mg In 103.626 623.199 





    Sodium Chloride 0.9% 250   





    ml @ 0.05 MCG/KG/MIN 17.   





    714 mls/hr IV .[id number]   





    BOUCHRA Rx#:[id number]   


 


    Norepinephrine 8 mg In   6.597





    Sodium Chloride 0.9% 250   





    ml @ 0.05 MCG/KG/MIN 8.   





    996 mls/hr IV .Q24H BOUCHRA   





    Rx#:[id number]   


 


Output:   


 


  Urine 5 155 75


 


  Estimated Blood Loss 3  


 


Other:   


 


  Voiding Method Indwelling Catheter Indwelling Catheter 














- Exam





Gen: awake, alert and oriented 2


HEENT: normocephalic, atraumatic, good hearing acuity, moist mucous membranes


Resp: good air exchange, breathing comfortably with no accessory muscle use


CVS: good distal perfusion x 4,


GI: soft, NTTP, ND


: no SPT, bilateral CVAT, herndon catheter is present, grossly hemorrhagic


MSK: no pitting edema, no clubbing


Neuro: non-focal, moving all extremities


Psych: cooperative, euthymic mood





- Labs


CBC & Chem 7: 


                                 10/13/21 04:21





                                 10/13/21 04:21


Labs: 


                  Abnormal Lab Results - Last 24 Hours (Table)











  10/12/21 10/12/21 10/12/21 Range/Units





  10:35 11:45 11:45 


 


WBC     (3.8-10.6)  k/uL


 


RBC     (4.30-5.90)  m/uL


 


Hgb     (13.0-17.5)  gm/dL


 


Hct     (39.0-53.0)  %


 


MCV     (80.0-100.0)  fL


 


MCHC     (31.0-37.0)  g/dL


 


Neutrophils #     (1.3-7.7)  k/uL


 


Neutrophils # (Manual)     (1.3-7.7)  k/uL


 


Monocytes #     (0-1.0)  k/uL


 


Metamyelocytes # (Man)     (0)  k/uL


 


Macrocytosis     


 


Sodium   150 H   (137-145)  mmol/L


 


Potassium     (3.5-5.1)  mmol/L


 


Chloride     ()  mmol/L


 


Carbon Dioxide   <5 L*   (22-30)  mmol/L


 


BUN   72 H   (9-20)  mg/dL


 


Creatinine   11.21 H*   (0.66-1.25)  mg/dL


 


Glucose   197 H   (74-99)  mg/dL


 


POC Glucose (mg/dL)     (75-99)  mg/dL


 


Plasma Lactic Acid Malick    >24.0 H*  (0.7-2.0)  mmol/L


 


Calcium     (8.4-10.2)  mg/dL


 


Phosphorus     (2.5-4.5)  mg/dL


 


AST     (17-59)  U/L


 


Alkaline Phosphatase     ()  U/L


 


Total Protein     (6.3-8.2)  g/dL


 


Procalcitonin     (0.02-0.09)  ng/mL


 


Urine Protein  2+ H    (Negative)  


 


Urine Glucose (UA)  Trace H    (Negative)  


 


Urine Ketones  1+ H    (Negative)  


 


Urine Blood  Moderate H    (Negative)  


 


Ur Leukocyte Esterase  Large H    (Negative)  


 


Urine RBC  >182 H    (0-5)  /hpf


 


Urine WBC  95 H    (0-5)  /hpf


 


Urine Bacteria  Rare H    (None)  /hpf


 


Urine Mucus  Rare H    (None)  /hpf


 


Urine Yeast (Budding)  Few H    (None)  /hpf














  10/12/21 10/12/21 10/12/21 Range/Units





  14:40 15:41 18:40 


 


WBC     (3.8-10.6)  k/uL


 


RBC     (4.30-5.90)  m/uL


 


Hgb     (13.0-17.5)  gm/dL


 


Hct     (39.0-53.0)  %


 


MCV     (80.0-100.0)  fL


 


MCHC     (31.0-37.0)  g/dL


 


Neutrophils #     (1.3-7.7)  k/uL


 


Neutrophils # (Manual)     (1.3-7.7)  k/uL


 


Monocytes #     (0-1.0)  k/uL


 


Metamyelocytes # (Man)     (0)  k/uL


 


Macrocytosis     


 


Sodium    146 H  (137-145)  mmol/L


 


Potassium    5.9 H  (3.5-5.1)  mmol/L


 


Chloride     ()  mmol/L


 


Carbon Dioxide    <5 L*  (22-30)  mmol/L


 


BUN    72 H  (9-20)  mg/dL


 


Creatinine    10.84 H*  (0.66-1.25)  mg/dL


 


Glucose    249 H  (74-99)  mg/dL


 


POC Glucose (mg/dL)  209 H    (75-99)  mg/dL


 


Plasma Lactic Acid Malick   >24.0 H*   (0.7-2.0)  mmol/L


 


Calcium    7.9 L  (8.4-10.2)  mg/dL


 


Phosphorus    13.2 H*  (2.5-4.5)  mg/dL


 


AST     (17-59)  U/L


 


Alkaline Phosphatase     ()  U/L


 


Total Protein     (6.3-8.2)  g/dL


 


Procalcitonin     (0.02-0.09)  ng/mL


 


Urine Protein     (Negative)  


 


Urine Glucose (UA)     (Negative)  


 


Urine Ketones     (Negative)  


 


Urine Blood     (Negative)  


 


Ur Leukocyte Esterase     (Negative)  


 


Urine RBC     (0-5)  /hpf


 


Urine WBC     (0-5)  /hpf


 


Urine Bacteria     (None)  /hpf


 


Urine Mucus     (None)  /hpf


 


Urine Yeast (Budding)     (None)  /hpf














  10/12/21 10/12/21 10/12/21 Range/Units





  18:40 18:40 18:44 


 


WBC     (3.8-10.6)  k/uL


 


RBC     (4.30-5.90)  m/uL


 


Hgb     (13.0-17.5)  gm/dL


 


Hct     (39.0-53.0)  %


 


MCV     (80.0-100.0)  fL


 


MCHC     (31.0-37.0)  g/dL


 


Neutrophils #     (1.3-7.7)  k/uL


 


Neutrophils # (Manual)     (1.3-7.7)  k/uL


 


Monocytes #     (0-1.0)  k/uL


 


Metamyelocytes # (Man)     (0)  k/uL


 


Macrocytosis     


 


Sodium     (137-145)  mmol/L


 


Potassium     (3.5-5.1)  mmol/L


 


Chloride     ()  mmol/L


 


Carbon Dioxide     (22-30)  mmol/L


 


BUN     (9-20)  mg/dL


 


Creatinine     (0.66-1.25)  mg/dL


 


Glucose     (74-99)  mg/dL


 


POC Glucose (mg/dL)    261 H  (75-99)  mg/dL


 


Plasma Lactic Acid Malick   22.7 H*   (0.7-2.0)  mmol/L


 


Calcium     (8.4-10.2)  mg/dL


 


Phosphorus     (2.5-4.5)  mg/dL


 


AST     (17-59)  U/L


 


Alkaline Phosphatase     ()  U/L


 


Total Protein     (6.3-8.2)  g/dL


 


Procalcitonin  1.57 H    (0.02-0.09)  ng/mL


 


Urine Protein     (Negative)  


 


Urine Glucose (UA)     (Negative)  


 


Urine Ketones     (Negative)  


 


Urine Blood     (Negative)  


 


Ur Leukocyte Esterase     (Negative)  


 


Urine RBC     (0-5)  /hpf


 


Urine WBC     (0-5)  /hpf


 


Urine Bacteria     (None)  /hpf


 


Urine Mucus     (None)  /hpf


 


Urine Yeast (Budding)     (None)  /hpf














  10/12/21 10/12/21 10/13/21 Range/Units





  21:55 22:51 00:39 


 


WBC    30.5 H  (3.8-10.6)  k/uL


 


RBC    3.36 L  (4.30-5.90)  m/uL


 


Hgb    11.0 L  (13.0-17.5)  gm/dL


 


Hct    36.9 L  (39.0-53.0)  %


 


MCV    109.7 H D  (80.0-100.0)  fL


 


MCHC    29.8 L  (31.0-37.0)  g/dL


 


Neutrophils #     (1.3-7.7)  k/uL


 


Neutrophils # (Manual)    25.30 H  (1.3-7.7)  k/uL


 


Monocytes #     (0-1.0)  k/uL


 


Metamyelocytes # (Man)    0.61 H  (0)  k/uL


 


Macrocytosis    Marked A  


 


Sodium     (137-145)  mmol/L


 


Potassium     (3.5-5.1)  mmol/L


 


Chloride     ()  mmol/L


 


Carbon Dioxide     (22-30)  mmol/L


 


BUN     (9-20)  mg/dL


 


Creatinine     (0.66-1.25)  mg/dL


 


Glucose     (74-99)  mg/dL


 


POC Glucose (mg/dL)   374 H   (75-99)  mg/dL


 


Plasma Lactic Acid Malick  23.5 H*    (0.7-2.0)  mmol/L


 


Calcium     (8.4-10.2)  mg/dL


 


Phosphorus     (2.5-4.5)  mg/dL


 


AST     (17-59)  U/L


 


Alkaline Phosphatase     ()  U/L


 


Total Protein     (6.3-8.2)  g/dL


 


Procalcitonin     (0.02-0.09)  ng/mL


 


Urine Protein     (Negative)  


 


Urine Glucose (UA)     (Negative)  


 


Urine Ketones     (Negative)  


 


Urine Blood     (Negative)  


 


Ur Leukocyte Esterase     (Negative)  


 


Urine RBC     (0-5)  /hpf


 


Urine WBC     (0-5)  /hpf


 


Urine Bacteria     (None)  /hpf


 


Urine Mucus     (None)  /hpf


 


Urine Yeast (Budding)     (None)  /hpf














  10/13/21 10/13/21 10/13/21 Range/Units





  00:39 00:39 04:21 


 


WBC    19.6 H  (3.8-10.6)  k/uL


 


RBC    2.94 L  (4.30-5.90)  m/uL


 


Hgb    9.7 L  (13.0-17.5)  gm/dL


 


Hct    30.4 L  (39.0-53.0)  %


 


MCV    103.4 H D  (80.0-100.0)  fL


 


MCHC     (31.0-37.0)  g/dL


 


Neutrophils #    16.7 H  (1.3-7.7)  k/uL


 


Neutrophils # (Manual)     (1.3-7.7)  k/uL


 


Monocytes #    1.4 H  (0-1.0)  k/uL


 


Metamyelocytes # (Man)     (0)  k/uL


 


Macrocytosis     


 


Sodium     (137-145)  mmol/L


 


Potassium  6.2 H*    (3.5-5.1)  mmol/L


 


Chloride     ()  mmol/L


 


Carbon Dioxide  6 L*    (22-30)  mmol/L


 


BUN  75 H    (9-20)  mg/dL


 


Creatinine  10.74 H*    (0.66-1.25)  mg/dL


 


Glucose  348 H    (74-99)  mg/dL


 


POC Glucose (mg/dL)     (75-99)  mg/dL


 


Plasma Lactic Acid Malick   19.7 H*   (0.7-2.0)  mmol/L


 


Calcium  7.6 L    (8.4-10.2)  mg/dL


 


Phosphorus  11.8 H*    (2.5-4.5)  mg/dL


 


AST  79 H    (17-59)  U/L


 


Alkaline Phosphatase  32 L    ()  U/L


 


Total Protein  5.4 L    (6.3-8.2)  g/dL


 


Procalcitonin     (0.02-0.09)  ng/mL


 


Urine Protein     (Negative)  


 


Urine Glucose (UA)     (Negative)  


 


Urine Ketones     (Negative)  


 


Urine Blood     (Negative)  


 


Ur Leukocyte Esterase     (Negative)  


 


Urine RBC     (0-5)  /hpf


 


Urine WBC     (0-5)  /hpf


 


Urine Bacteria     (None)  /hpf


 


Urine Mucus     (None)  /hpf


 


Urine Yeast (Budding)     (None)  /hpf














  10/13/21 10/13/21 10/13/21 Range/Units





  04:21 04:21 06:32 


 


WBC     (3.8-10.6)  k/uL


 


RBC     (4.30-5.90)  m/uL


 


Hgb     (13.0-17.5)  gm/dL


 


Hct     (39.0-53.0)  %


 


MCV     (80.0-100.0)  fL


 


MCHC     (31.0-37.0)  g/dL


 


Neutrophils #     (1.3-7.7)  k/uL


 


Neutrophils # (Manual)     (1.3-7.7)  k/uL


 


Monocytes #     (0-1.0)  k/uL


 


Metamyelocytes # (Man)     (0)  k/uL


 


Macrocytosis     


 


Sodium  148 H    (137-145)  mmol/L


 


Potassium  5.4 H    (3.5-5.1)  mmol/L


 


Chloride  97 L    ()  mmol/L


 


Carbon Dioxide  11 L    (22-30)  mmol/L


 


BUN  81 H    (9-20)  mg/dL


 


Creatinine  10.25 H*    (0.66-1.25)  mg/dL


 


Glucose  375 H    (74-99)  mg/dL


 


POC Glucose (mg/dL)    382 H  (75-99)  mg/dL


 


Plasma Lactic Acid Malick   17.3 H*   (0.7-2.0)  mmol/L


 


Calcium  7.1 L    (8.4-10.2)  mg/dL


 


Phosphorus  8.4 H    (2.5-4.5)  mg/dL


 


AST     (17-59)  U/L


 


Alkaline Phosphatase     ()  U/L


 


Total Protein     (6.3-8.2)  g/dL


 


Procalcitonin     (0.02-0.09)  ng/mL


 


Urine Protein     (Negative)  


 


Urine Glucose (UA)     (Negative)  


 


Urine Ketones     (Negative)  


 


Urine Blood     (Negative)  


 


Ur Leukocyte Esterase     (Negative)  


 


Urine RBC     (0-5)  /hpf


 


Urine WBC     (0-5)  /hpf


 


Urine Bacteria     (None)  /hpf


 


Urine Mucus     (None)  /hpf


 


Urine Yeast (Budding)     (None)  /hpf














  10/13/21 Range/Units





  08:24 


 


WBC   (3.8-10.6)  k/uL


 


RBC   (4.30-5.90)  m/uL


 


Hgb   (13.0-17.5)  gm/dL


 


Hct   (39.0-53.0)  %


 


MCV   (80.0-100.0)  fL


 


MCHC   (31.0-37.0)  g/dL


 


Neutrophils #   (1.3-7.7)  k/uL


 


Neutrophils # (Manual)   (1.3-7.7)  k/uL


 


Monocytes #   (0-1.0)  k/uL


 


Metamyelocytes # (Man)   (0)  k/uL


 


Macrocytosis   


 


Sodium   (137-145)  mmol/L


 


Potassium   (3.5-5.1)  mmol/L


 


Chloride   ()  mmol/L


 


Carbon Dioxide   (22-30)  mmol/L


 


BUN   (9-20)  mg/dL


 


Creatinine   (0.66-1.25)  mg/dL


 


Glucose   (74-99)  mg/dL


 


POC Glucose (mg/dL)   (75-99)  mg/dL


 


Plasma Lactic Acid Malick  13.8 H*  (0.7-2.0)  mmol/L


 


Calcium   (8.4-10.2)  mg/dL


 


Phosphorus   (2.5-4.5)  mg/dL


 


AST   (17-59)  U/L


 


Alkaline Phosphatase   ()  U/L


 


Total Protein   (6.3-8.2)  g/dL


 


Procalcitonin   (0.02-0.09)  ng/mL


 


Urine Protein   (Negative)  


 


Urine Glucose (UA)   (Negative)  


 


Urine Ketones   (Negative)  


 


Urine Blood   (Negative)  


 


Ur Leukocyte Esterase   (Negative)  


 


Urine RBC   (0-5)  /hpf


 


Urine WBC   (0-5)  /hpf


 


Urine Bacteria   (None)  /hpf


 


Urine Mucus   (None)  /hpf


 


Urine Yeast (Budding)   (None)  /hpf








                      Microbiology - Last 24 Hours (Table)











 10/12/21 13:57 Gram Stain - Preliminary





 Aspirate Body Fluid Culture - Preliminary


 


 10/12/21 13:57 Anaerobic Culture - Preliminary





 Aspirate 


 


 10/12/21 10:35 Urine Culture - Preliminary





 Urine,Clean Catch 














Assessment and Plan


Assessment: 





Bilateral hydronephrosis


Obstructive nephrolithiasis


Post renal Acute kidney injury


-Admit inpatient, ICU with Pulm consult


-Nephrology consult


-Urology consult stat for nephroureteral stent completed on 10/12


-Pain control: Dilaudid when necessary with PCA


-Stool softener


-Nausea control


-IV fluids, hourly urine output


-Bicarb drip








Diabetes


Hypertension


Hyperlipidemia


-Home medications reviewed and reconciled 





Patient is full code


DVT prophylaxis with heparin 3 times a day

## 2021-10-13 NOTE — P.PN
Subjective


Progress Note Date: 10/13/21





Underwent bilateral ureteral stent placement yesterday, for bilateral 

hydronephrosis.  Having low urine output last night, urine output improving this

morning.  Denies any flank pain





Objective





- Vital Signs


Vital signs: 


                                   Vital Signs











Temp  97.9 F   10/13/21 04:00


 


Pulse  96   10/13/21 07:00


 


Resp  16   10/13/21 07:00


 


BP  132/64   10/13/21 07:00


 


Pulse Ox  92 L  10/13/21 07:00








                                 Intake & Output











 10/12/21 10/13/21 10/13/21





 18:59 06:59 18:59


 


Intake Total 3853.626 2643.199 176.597


 


Output Total 8 155 75


 


Balance 3845.626 2488.199 101.597


 


Weight 97.7 kg 98.2 kg 


 


Intake:   


 


  IV 3750 2020 170


 


    0.9 @20mls/hr  220 20


 


    Dextrose 5% in Water 1, 450 1800 150





    000 ml @ 150 mls/hr IV .   





    Q7H40M BOUCHRA with Sodium   





    Bicarb (1 Meq/ml) 150 ml   





    Rx#:[id number]   


 


    Sodium Chloride 0.9% 3, 3000  





    000 ml @ 999 mls/hr IV .   





    Q3H1M ONE Rx#:[id number]   


 


  Intake, IV Titration 103.626 623.199 6.597





  Amount   


 


    Norepinephrine 4 mg In 103.626 623.199 





    Sodium Chloride 0.9% 250   





    ml @ 0.05 MCG/KG/MIN 17.   





    714 mls/hr IV .[id number]   





    BOUCHRA Rx#:[id number]   


 


    Norepinephrine 8 mg In   6.597





    Sodium Chloride 0.9% 250   





    ml @ 0.05 MCG/KG/MIN 8.   





    996 mls/hr IV .Q24H BOUCHRA   





    Rx#:[id number]   


 


Output:   


 


  Urine 5 155 75


 


  Estimated Blood Loss 3  


 


Other:   


 


  Voiding Method Indwelling Catheter Indwelling Catheter 














- Constitutional


General appearance: Present: no acute distress





- Gastrointestinal


General gastrointestinal: Present: soft.  Absent: distended





- Psychiatric


Psychiatric: Present: A&O x's 3





- Labs


CBC & Chem 7: 


                                 10/13/21 04:21





                                 10/13/21 12:06


Labs: 


                  Abnormal Lab Results - Last 24 Hours (Table)











  10/12/21 10/12/21 10/12/21 Range/Units





  18:40 18:40 18:40 


 


WBC     (3.8-10.6)  k/uL


 


RBC     (4.30-5.90)  m/uL


 


Hgb     (13.0-17.5)  gm/dL


 


Hct     (39.0-53.0)  %


 


MCV     (80.0-100.0)  fL


 


MCHC     (31.0-37.0)  g/dL


 


Neutrophils #     (1.3-7.7)  k/uL


 


Neutrophils # (Manual)     (1.3-7.7)  k/uL


 


Monocytes #     (0-1.0)  k/uL


 


Metamyelocytes # (Man)     (0)  k/uL


 


Macrocytosis     


 


Sodium  146 H    (137-145)  mmol/L


 


Potassium  5.9 H    (3.5-5.1)  mmol/L


 


Chloride     ()  mmol/L


 


Carbon Dioxide  <5 L*    (22-30)  mmol/L


 


BUN  72 H    (9-20)  mg/dL


 


Creatinine  10.84 H*    (0.66-1.25)  mg/dL


 


Glucose  249 H    (74-99)  mg/dL


 


POC Glucose (mg/dL)     (75-99)  mg/dL


 


Plasma Lactic Acid Malick    22.7 H*  (0.7-2.0)  mmol/L


 


Calcium  7.9 L    (8.4-10.2)  mg/dL


 


Phosphorus  13.2 H*    (2.5-4.5)  mg/dL


 


AST     (17-59)  U/L


 


Alkaline Phosphatase     ()  U/L


 


Total Protein     (6.3-8.2)  g/dL


 


Procalcitonin   1.57 H   (0.02-0.09)  ng/mL














  10/12/21 10/12/21 10/12/21 Range/Units





  18:44 21:55 22:51 


 


WBC     (3.8-10.6)  k/uL


 


RBC     (4.30-5.90)  m/uL


 


Hgb     (13.0-17.5)  gm/dL


 


Hct     (39.0-53.0)  %


 


MCV     (80.0-100.0)  fL


 


MCHC     (31.0-37.0)  g/dL


 


Neutrophils #     (1.3-7.7)  k/uL


 


Neutrophils # (Manual)     (1.3-7.7)  k/uL


 


Monocytes #     (0-1.0)  k/uL


 


Metamyelocytes # (Man)     (0)  k/uL


 


Macrocytosis     


 


Sodium     (137-145)  mmol/L


 


Potassium     (3.5-5.1)  mmol/L


 


Chloride     ()  mmol/L


 


Carbon Dioxide     (22-30)  mmol/L


 


BUN     (9-20)  mg/dL


 


Creatinine     (0.66-1.25)  mg/dL


 


Glucose     (74-99)  mg/dL


 


POC Glucose (mg/dL)  261 H   374 H  (75-99)  mg/dL


 


Plasma Lactic Acid Malick   23.5 H*   (0.7-2.0)  mmol/L


 


Calcium     (8.4-10.2)  mg/dL


 


Phosphorus     (2.5-4.5)  mg/dL


 


AST     (17-59)  U/L


 


Alkaline Phosphatase     ()  U/L


 


Total Protein     (6.3-8.2)  g/dL


 


Procalcitonin     (0.02-0.09)  ng/mL














  10/13/21 10/13/21 10/13/21 Range/Units





  00:39 00:39 00:39 


 


WBC  30.5 H    (3.8-10.6)  k/uL


 


RBC  3.36 L    (4.30-5.90)  m/uL


 


Hgb  11.0 L    (13.0-17.5)  gm/dL


 


Hct  36.9 L    (39.0-53.0)  %


 


MCV  109.7 H D    (80.0-100.0)  fL


 


MCHC  29.8 L    (31.0-37.0)  g/dL


 


Neutrophils #     (1.3-7.7)  k/uL


 


Neutrophils # (Manual)  25.30 H    (1.3-7.7)  k/uL


 


Monocytes #     (0-1.0)  k/uL


 


Metamyelocytes # (Man)  0.61 H    (0)  k/uL


 


Macrocytosis  Marked A    


 


Sodium     (137-145)  mmol/L


 


Potassium   6.2 H*   (3.5-5.1)  mmol/L


 


Chloride     ()  mmol/L


 


Carbon Dioxide   6 L*   (22-30)  mmol/L


 


BUN   75 H   (9-20)  mg/dL


 


Creatinine   10.74 H*   (0.66-1.25)  mg/dL


 


Glucose   348 H   (74-99)  mg/dL


 


POC Glucose (mg/dL)     (75-99)  mg/dL


 


Plasma Lactic Acid Malick    19.7 H*  (0.7-2.0)  mmol/L


 


Calcium   7.6 L   (8.4-10.2)  mg/dL


 


Phosphorus   11.8 H*   (2.5-4.5)  mg/dL


 


AST   79 H   (17-59)  U/L


 


Alkaline Phosphatase   32 L   ()  U/L


 


Total Protein   5.4 L   (6.3-8.2)  g/dL


 


Procalcitonin     (0.02-0.09)  ng/mL














  10/13/21 10/13/21 10/13/21 Range/Units





  04:21 04:21 04:21 


 


WBC  19.6 H    (3.8-10.6)  k/uL


 


RBC  2.94 L    (4.30-5.90)  m/uL


 


Hgb  9.7 L    (13.0-17.5)  gm/dL


 


Hct  30.4 L    (39.0-53.0)  %


 


MCV  103.4 H D    (80.0-100.0)  fL


 


MCHC     (31.0-37.0)  g/dL


 


Neutrophils #  16.7 H    (1.3-7.7)  k/uL


 


Neutrophils # (Manual)     (1.3-7.7)  k/uL


 


Monocytes #  1.4 H    (0-1.0)  k/uL


 


Metamyelocytes # (Man)     (0)  k/uL


 


Macrocytosis     


 


Sodium   148 H   (137-145)  mmol/L


 


Potassium   5.4 H   (3.5-5.1)  mmol/L


 


Chloride   97 L   ()  mmol/L


 


Carbon Dioxide   11 L   (22-30)  mmol/L


 


BUN   81 H   (9-20)  mg/dL


 


Creatinine   10.25 H*   (0.66-1.25)  mg/dL


 


Glucose   375 H   (74-99)  mg/dL


 


POC Glucose (mg/dL)     (75-99)  mg/dL


 


Plasma Lactic Acid Malick    17.3 H*  (0.7-2.0)  mmol/L


 


Calcium   7.1 L   (8.4-10.2)  mg/dL


 


Phosphorus   8.4 H   (2.5-4.5)  mg/dL


 


AST     (17-59)  U/L


 


Alkaline Phosphatase     ()  U/L


 


Total Protein     (6.3-8.2)  g/dL


 


Procalcitonin     (0.02-0.09)  ng/mL














  10/13/21 10/13/21 10/13/21 Range/Units





  06:32 08:24 11:42 


 


WBC     (3.8-10.6)  k/uL


 


RBC     (4.30-5.90)  m/uL


 


Hgb     (13.0-17.5)  gm/dL


 


Hct     (39.0-53.0)  %


 


MCV     (80.0-100.0)  fL


 


MCHC     (31.0-37.0)  g/dL


 


Neutrophils #     (1.3-7.7)  k/uL


 


Neutrophils # (Manual)     (1.3-7.7)  k/uL


 


Monocytes #     (0-1.0)  k/uL


 


Metamyelocytes # (Man)     (0)  k/uL


 


Macrocytosis     


 


Sodium     (137-145)  mmol/L


 


Potassium     (3.5-5.1)  mmol/L


 


Chloride     ()  mmol/L


 


Carbon Dioxide     (22-30)  mmol/L


 


BUN     (9-20)  mg/dL


 


Creatinine     (0.66-1.25)  mg/dL


 


Glucose     (74-99)  mg/dL


 


POC Glucose (mg/dL)  382 H   323 H  (75-99)  mg/dL


 


Plasma Lactic Acid Malick   13.8 H*   (0.7-2.0)  mmol/L


 


Calcium     (8.4-10.2)  mg/dL


 


Phosphorus     (2.5-4.5)  mg/dL


 


AST     (17-59)  U/L


 


Alkaline Phosphatase     ()  U/L


 


Total Protein     (6.3-8.2)  g/dL


 


Procalcitonin     (0.02-0.09)  ng/mL














  10/13/21 10/13/21 10/13/21 Range/Units





  12:06 12:06 15:51 


 


WBC     (3.8-10.6)  k/uL


 


RBC     (4.30-5.90)  m/uL


 


Hgb     (13.0-17.5)  gm/dL


 


Hct     (39.0-53.0)  %


 


MCV     (80.0-100.0)  fL


 


MCHC     (31.0-37.0)  g/dL


 


Neutrophils #     (1.3-7.7)  k/uL


 


Neutrophils # (Manual)     (1.3-7.7)  k/uL


 


Monocytes #     (0-1.0)  k/uL


 


Metamyelocytes # (Man)     (0)  k/uL


 


Macrocytosis     


 


Sodium  147 H    (137-145)  mmol/L


 


Potassium     (3.5-5.1)  mmol/L


 


Chloride  95 L    ()  mmol/L


 


Carbon Dioxide     (22-30)  mmol/L


 


BUN  91 H    (9-20)  mg/dL


 


Creatinine  9.72 H*    (0.66-1.25)  mg/dL


 


Glucose  300 H    (74-99)  mg/dL


 


POC Glucose (mg/dL)     (75-99)  mg/dL


 


Plasma Lactic Acid Malick   10.7 H*  7.4 H*  (0.7-2.0)  mmol/L


 


Calcium  6.9 L    (8.4-10.2)  mg/dL


 


Phosphorus     (2.5-4.5)  mg/dL


 


AST     (17-59)  U/L


 


Alkaline Phosphatase     ()  U/L


 


Total Protein     (6.3-8.2)  g/dL


 


Procalcitonin     (0.02-0.09)  ng/mL














  10/13/21 Range/Units





  16:58 


 


WBC   (3.8-10.6)  k/uL


 


RBC   (4.30-5.90)  m/uL


 


Hgb   (13.0-17.5)  gm/dL


 


Hct   (39.0-53.0)  %


 


MCV   (80.0-100.0)  fL


 


MCHC   (31.0-37.0)  g/dL


 


Neutrophils #   (1.3-7.7)  k/uL


 


Neutrophils # (Manual)   (1.3-7.7)  k/uL


 


Monocytes #   (0-1.0)  k/uL


 


Metamyelocytes # (Man)   (0)  k/uL


 


Macrocytosis   


 


Sodium   (137-145)  mmol/L


 


Potassium   (3.5-5.1)  mmol/L


 


Chloride   ()  mmol/L


 


Carbon Dioxide   (22-30)  mmol/L


 


BUN   (9-20)  mg/dL


 


Creatinine   (0.66-1.25)  mg/dL


 


Glucose   (74-99)  mg/dL


 


POC Glucose (mg/dL)  208 H  (75-99)  mg/dL


 


Plasma Lactic Acid Malick   (0.7-2.0)  mmol/L


 


Calcium   (8.4-10.2)  mg/dL


 


Phosphorus   (2.5-4.5)  mg/dL


 


AST   (17-59)  U/L


 


Alkaline Phosphatase   ()  U/L


 


Total Protein   (6.3-8.2)  g/dL


 


Procalcitonin   (0.02-0.09)  ng/mL








                      Microbiology - Last 24 Hours (Table)











 10/12/21 13:57 Gram Stain - Preliminary





 Aspirate Body Fluid Culture - Preliminary


 


 10/12/21 13:57 Anaerobic Culture - Preliminary





 Aspirate 


 


 10/12/21 10:35 Urine Culture - Preliminary





 Urine,Clean Catch 














Assessment and Plan


Assessment: 





71-year-old male admitted to the hospital with renal failure, underwent 

bilateral ureteral stent placement on October 12.  Was having low urine output 

last night  UO is improving this morning


-Irrigate Gibson PRN if urine output drops or gross hematuria worsens.  Urine was

light red this morning consistent with recent some placement


-We'll continue to trend creatinine


-Follow up on urine culture, recommend continuing Cetrixone until cultures are 

finalized

## 2021-10-13 NOTE — P.NPCON
History of Present Illness





- Reason for Consult


acute renal failure, hyperkalemia





- History of Present Illness





Reason for consultation: Acute kidney injury and hyperkalemia





History of present illness:


Patient is a 71-year-old male seen in consultation for acute kidney injury and 

hyperkalemia.  Patient presented to the hospital with generalized weakness as 

well as nausea and vomiting.  Patient is currently resting in bed and is 

somewhat lethargic.  He was extubated yesterday.  Patient was noted to have 

bilateral hydronephrosis due to kidney stones and has been following with 

urology prior to this admission.  He underwent bilateral ureteral stent 

placements on 10/12/2021.  He is also received 6 L of normal saline bolus and is

now maintained on bicarbonate running at 1 50 mL an hour.  Patient was noted to 

be severely acidotic which is now starting to improve.  Potassium was also high 

at 6.7 and has been medically treated multiple times.  It was 5.4 this morning. 

Urine output is starting to improve and was 60 mL and 75 mL in the last 2 hours.

 Blood pressure was also low in the systolic 80s to 90s and is better now.  He 

is on low-dose Levophed.  He was also on lisinopril outpatient which is 

currently held.  Additionally I also see Toradol and his home medication list.  

Patient does have history of diabetes and was on metformin as part of his 

treatment.





Vital signs are stable.


General: The patient appeared well nourished and normally developed. 


HEENT: Head exam is unremarkable. 


LUNGS: Breath sounds decreased.


HEART: Rate and Rhythm are regular. 


ABDOMEN: Soft, no distention.


EXTREMITITES: No edema.





Past Medical History


Past Medical History: Asthma, Diabetes Mellitus, Hyperlipidemia, Hypertension, 

Skin Disorder


Additional Past Medical History / Comment(s): hx heart murmer, kidney stones, 

umbilical hernia,


History of Any Multi-Drug Resistant Organisms: None Reported


Past Surgical History: Orthopedic Surgery


Additional Past Surgical History / Comment(s): rt knee surgery, mole removed 

from rt upper chest, rt shoulder-rotator cuff, kidney stones blasted multiple 

times


Past Anesthesia/Blood Transfusion Reactions: No Reported Reaction


Past Psychological History: Depression


Smoking Status: Never smoker


Past Alcohol Use History: None Reported


Past Drug Use History: None Reported





- Past Family History


  ** Sister(s)


Family Medical History: Deep Vein Thrombosis (DVT)





Medications and Allergies


                                Home Medications











 Medication  Instructions  Recorded  Confirmed  Type


 


lisinopriL [Zestril] 10 mg PO QAM 12/16/16 10/12/21 History


 


Escitalopram Oxalate [Lexapro] 30 mg PO DAILY 01/23/19 10/12/21 History


 


Multivitamins, Thera [Multivitamin 1 tab PO DAILY 01/23/19 10/12/21 History





(formulary)]    


 


Gabapentin [Neurontin] 400 mg PO TID 10/16/20 10/12/21 History


 


Lovastatin [Mevacor] 40 mg PO DAILY 10/16/20 10/12/21 History


 


Ketorolac [Toradol] 10 mg PO Q8HR #15 tab 10/05/21 10/12/21 Rx


 


Ondansetron Odt [Zofran Odt] 4 mg PO Q8HR PRN #10 tab 10/05/21 10/12/21 Rx


 


Tamsulosin [Flomax] 0.4 mg PO DAILY #7 cap 10/05/21 10/12/21 Rx


 


metFORMIN HCL ER [Glucophage XR] 1,000 mg PO BID 10/05/21 10/12/21 History


 


Semaglutide [Ozempic] 0.5 mg SQ Q7D 10/12/21 10/12/21 History








                                    Allergies











Allergy/AdvReac Type Severity Reaction Status Date / Time


 


No Known Allergies Allergy   Verified 10/12/21 08:29














Physical Exam


Vitals: 


                                   Vital Signs











  Temp Pulse Resp BP Pulse Ox


 


 10/13/21 07:00   96  16  132/64  92 L


 


 10/13/21 06:45   98  17  125/53  94 L


 


 10/13/21 06:30   99  20  130/56  94 L


 


 10/13/21 06:15   101 H  19  97/54  95


 


 10/13/21 06:00   99  38 H  134/57  94 L


 


 10/13/21 05:45   99  22  126/62  94 L


 


 10/13/21 05:30   99  17  110/95  95


 


 10/13/21 05:15   99  21  117/52  95


 


 10/13/21 05:00   98  34 H  109/56  94 L


 


 10/13/21 04:45   98  19  117/55  95


 


 10/13/21 04:30   98  18  90/46  94 L


 


 10/13/21 04:15   96  26 H  114/57  95


 


 10/13/21 04:00  97.9 F  99  28 H  128/58  96


 


 10/13/21 03:45   101 H  20  128/60  95


 


 10/13/21 03:30   101 H  18  118/54  95


 


 10/13/21 03:15   106 H  23  92/56  96


 


 10/13/21 03:00   96  19  112/45  95


 


 10/13/21 02:45   96  18  109/42  96


 


 10/13/21 02:30   96  18  91/38  96


 


 10/13/21 02:15   98  22  107/45  94 L


 


 10/13/21 02:00   98  24  99/39  95


 


 10/13/21 01:45   100  24  114/47  96


 


 10/13/21 01:30   98  19  85/35  95


 


 10/13/21 01:15   101 H  20  93/45  95


 


 10/13/21 01:00   99  20  107/44  96


 


 10/13/21 00:45   97  19  105/47  97


 


 10/13/21 00:30   98  18  106/44  95


 


 10/13/21 00:15   99  19  104/44  95


 


 10/13/21 00:00  97.8 F  99  18  104/44  94 L


 


 10/12/21 23:45   102 H  31 H   94 L


 


 10/12/21 23:30   100  20  107/55  94 L


 


 10/12/21 23:15   101 H  23   95


 


 10/12/21 23:00   99  20  115/62  95


 


 10/12/21 22:45   98  21  110/49  94 L


 


 10/12/21 22:30    17   94 L


 


 10/12/21 22:15   97  19   93 L


 


 10/12/21 22:00   96  20  113/54  93 L


 


 10/12/21 21:45   96  20  99/43  94 L


 


 10/12/21 21:30   92  20  118/43  94 L


 


 10/12/21 21:15   98  19  115/50  94 L


 


 10/12/21 21:00  97.7 F  98  21  112/46  93 L


 


 10/12/21 20:45   98  17  115/51  92 L


 


 10/12/21 20:30   96  18  119/52  92 L


 


 10/12/21 20:15   104 H  19  136/57  94 L


 


 10/12/21 20:00   90  20  138/58  94 L


 


 10/12/21 19:45   90  18  131/56  93 L


 


 10/12/21 19:30   87  18  67/35  94 L


 


 10/12/21 19:15   72  18  76/34  94 L


 


 10/12/21 19:00   73  19  112/38  95


 


 10/12/21 18:45   80  20  92/43  95


 


 10/12/21 18:30  93.8 F L  77  21  86/41  94 L


 


 10/12/21 18:15   72  22  79/41  95


 


 10/12/21 18:00   72  22  69/33  95


 


 10/12/21 17:45   65  22  73/35  94 L


 


 10/12/21 17:30   68  20  63/35  95


 


 10/12/21 17:15   65  20  72/38  96


 


 10/12/21 17:00   66  21  75/37  96


 


 10/12/21 16:45   67  21  82/35  99


 


 10/12/21 16:30   69  23  84/37  99


 


 10/12/21 16:15   69  22  89/42  99


 


 10/12/21 16:00   71  20  86/41  98


 


 10/12/21 15:45   68  20  86/44  98


 


 10/12/21 15:30  91.2 F L  68  21  96/34  99


 


 10/12/21 15:15   67  38 H  92/42  99


 


 10/12/21 15:00   66  31 H  85/47  99


 


 10/12/21 12:57   74  18  94/40 


 


 10/12/21 11:30   91  24  93/34  97


 


 10/12/21 10:44   84  22  


 


 10/12/21 10:32   87  22  


 


 10/12/21 09:43   92  18  147/68  98








                                Intake and Output











 10/12/21 10/13/21 10/13/21





 22:59 06:59 14:59


 


Intake Total 4364.000 1832.825 176.597


 


Output Total 30 130 75


 


Balance 4334.000 1702.825 101.597


 


Intake:   


 


  IV 4110 1360 170


 


    0.9 @20mls/hr 60 160 20


 


    Dextrose 5% in Water 1, 1050 1200 150





    000 ml @ 150 mls/hr IV .   





    Q7H40M BOUCHRA with Sodium   





    Bicarb (1 Meq/ml) 150 ml   





    Rx#:[id number]   


 


    Sodium Chloride 0.9% 3, 3000  





    000 ml @ 999 mls/hr IV .   





    Q3H1M ONE Rx#:[id number]   


 


  Intake, IV Titration 254.000 472.825 6.597





  Amount   


 


    Norepinephrine 4 mg In 254.000 472.825 





    Sodium Chloride 0.9% 250   





    ml @ 0.05 MCG/KG/MIN 17.   





    714 mls/hr IV .[id number]   





    [facility] Rx#:[id number]   


 


    Norepinephrine 8 mg In   6.597





    Sodium Chloride 0.9% 250   





    ml @ 0.05 MCG/KG/MIN 8.   





    996 mls/hr IV .Q24H [facility]   





    Rx#:[id number]   


 


Output:   


 


  Urine 30 130 75


 


Other:   


 


  Voiding Method Indwelling Catheter Indwelling Catheter 


 


  Weight 97.7 kg 98.2 kg 














Results





- Lab Results


                             Most recent lab results











Calcium  7.1 mg/dL (8.4-10.2)  L  10/13/21  04:21    


 


Phosphorus  8.4 mg/dL (2.5-4.5)  H  10/13/21  04:21    


 


Magnesium  1.6 mg/dL (1.6-2.3)   10/13/21  04:21    














                                 10/13/21 04:21





                                 10/13/21 04:21





Assessment and Plan


Plan: 





Assessment:


1.  Acute kidney injury secondary to ATN secondary to obstructive uropathy and 

hypotension.  Creatinine was 11.6 on admission and is 10.25 this morning.  Aria del real's creatinine the last 2 years has been in the range of 1.6-2.


2.  Bilateral hydronephrosis with nephrolithiasis status post ureteral stent 

placement on October 12.


3.  Hyperkalemia secondary to acute kidney injury, metabolic acidosis and 

lisinopril.


4.  Metabolic acidosis secondary to acute kidney injury and lactic acidosis.  He

 was also on metformin outpatient.


5.  Diabetes mellitus.


6.  Hypernatremia from lack of oral water intake.





Plan:


Maintain bicarb drip at 150 mL an hour.


Encourage oral intake, including free water.


Continue to monitor renal function and urine output.


Wean Levophed.


Repeat BMP at noon.


Continue to assess daily for need for renal replacement therapy.


Blood sugar control.





Thank you for the consultation.  I will continue to follow the patient with you 

during his hospital stay.

## 2021-10-13 NOTE — P.PN
Subjective


Progress Note Date: 10/13/21


Principal diagnosis: 





Urinary tract infection, kidney stones.





Pulmonary/critical care consultation dated 10/12/2021.





71-year-old male, who presents to the emergency department at 0652 in the 

morning on October 12.  He was brought in by his wife.  This patient for the 

last week to 10 days has not been feeling well.  He's had significant weakness, 

fatigue, nausea, and vomiting.  According to his wife, the patient became very 

weak, and really could not balance himself or walk properly.  For that reason, 

she brought in to be evaluated.  In addition, the patient has not urinated in 

the last 24 hours.  He had some mental status changes and obviously was very 

concerned.  We were asked to see the patient for possible admission to the 

intensive care unit.  The patient was hypotensive.  In addition, the patient had

developed acute kidney injury/acute renal failure, and was found on CAT scan to 

have kidney stones.  He may need a stent placement according to the ER 

physician.  His medical history includes asthma, diabetes, hyperlipidemia, 

hypertension, kidney stones, umbilical hernia, and recent coronavirus infection.

 The patient is a lifelong nonsmoker.  There is a family history of deep venous 

thrombosis.  White count was 15.8, hemoglobin 11.5, hematocrit 37.2, platelet 

count 219,000.  Sodium 150, potassium 5.1, chlorides 101, CO2 less than 5, anion

gap was 38, BUN 72, and creatinine 11.21.  Lactic acid was 14.6.  CT of the 

abdomen and pelvis showed new mild right-sided hydronephrosis and right 

perinephritic fat stranding with interval passage of a small lower pole right 

renal calculi into the bladder.  There is persistent moderate left-sided 

hydronephrosis due to obstructing distal ureter calculi.  It should also be 

noted that the patient was here about a week ago, hydrated in the emergency 

department, and discharged home.  The patient is to go to the operating room for

bilateral stent placement.





Progress note dated 10/13/2021.





71-year-old white male seen yesterday in the emergency department.  The patient 

went to the operating room yesterday, had bilateral ureteral stents.  He was 

successfully extubated on October 12.  Today's postop day #1.  Currently, he's 

on 3 L nasal cannula, IV Rocephin, and an IV of D5W with 3 ampules of sodium 

bicarbonate at 1 50 mL an hour.  The patient is getting saline at 20 mL now 

which I told the nurse to discontinue.  In addition, the patient is on 

norepinephrine at 9 mcg/m.  Clinically he appears to be a bit sleepy but does 

arouse and is appropriate.  White count is 19.6, hemoglobin 9.7, hematocrit 

30.4, and platelet count 215,000 sodium is 148, potassium 5.4, chlorides 97, CO2

11, anion gap is 4, BUN is 81, creatinine is 10.25.  Plasma lactic acid is 13.8.

 Magnesium 1.6.





Objective





- Vital Signs


Vital signs: 


                                   Vital Signs











Temp  97.9 F   10/13/21 04:00


 


Pulse  96   10/13/21 07:00


 


Resp  16   10/13/21 07:00


 


BP  132/64   10/13/21 07:00


 


Pulse Ox  92 L  10/13/21 07:00








                                 Intake & Output











 10/12/21 10/13/21 10/13/21





 18:59 06:59 18:59


 


Intake Total 3853.626 2643.199 176.597


 


Output Total 8 155 75


 


Balance 3845.626 2488.199 101.597


 


Weight 97.7 kg 98.2 kg 


 


Intake:   


 


  IV 3750 2020 170


 


    0.9 @20mls/hr  220 20


 


    Dextrose 5% in Water 1, 450 1800 150





    000 ml @ 150 mls/hr IV .   





    Q7H40M BOUCHRA with Sodium   





    Bicarb (1 Meq/ml) 150 ml   





    Rx#:[id number]   


 


    Sodium Chloride 0.9% 3, 3000  





    000 ml @ 999 mls/hr IV .   





    Q3H1M ONE Rx#:[id number]   


 


  Intake, IV Titration 103.626 623.199 6.597





  Amount   


 


    Norepinephrine 4 mg In 103.626 623.199 





    Sodium Chloride 0.9% 250   





    ml @ 0.05 MCG/KG/MIN 17.   





    714 mls/hr IV .[id number]   





    BOUCHRA Rx#:[id number]   


 


    Norepinephrine 8 mg In   6.597





    Sodium Chloride 0.9% 250   





    ml @ 0.05 MCG/KG/MIN 8.   





    996 mls/hr IV .Q24H BOUCHRA   





    Rx#:[id number]   


 


Output:   


 


  Urine 5 155 75


 


  Estimated Blood Loss 3  


 


Other:   


 


  Voiding Method Indwelling Catheter Indwelling Catheter 














- Exam





No acute distress, sleepy, possibly from pain medication.  No respiratory 

distress, audible wheezing, use of accessory muscles, or conversational dyspnea.

 Currently on 3 L nasal cannula.





HEENT examination is grossly unremarkable.  





Neck supple.  Full range of motion.  No adenopathy thyromegaly or neck vein 

distention.





Cardiovascular examination reveals regular rhythm rate.  S1-S2 normal.  No S3 or

S4.  No discernible murmur noted.  Heart rate 96 bpm.





Lungs reveal mostly clear breath sounds.  No wheezes or crackles.  Scattered 

mild rhonchi.  Breath sounds equal bilaterally.





Abdomen soft bowel sounds are heard.  No masses or tenderness.





Extremities are intact.  No cyanosis clubbing or edema.





Skin is without rash or lesion.





Neurologic examination is brief but nonfocal.





- Labs


CBC & Chem 7: 


                                 10/13/21 04:21





                                 10/13/21 04:21


Labs: 


                  Abnormal Lab Results - Last 24 Hours (Table)











  10/12/21 10/12/21 10/12/21 Range/Units





  10:35 11:45 11:45 


 


WBC     (3.8-10.6)  k/uL


 


RBC     (4.30-5.90)  m/uL


 


Hgb     (13.0-17.5)  gm/dL


 


Hct     (39.0-53.0)  %


 


MCV     (80.0-100.0)  fL


 


MCHC     (31.0-37.0)  g/dL


 


Neutrophils #     (1.3-7.7)  k/uL


 


Neutrophils # (Manual)     (1.3-7.7)  k/uL


 


Monocytes #     (0-1.0)  k/uL


 


Metamyelocytes # (Man)     (0)  k/uL


 


Macrocytosis     


 


Sodium   150 H   (137-145)  mmol/L


 


Potassium     (3.5-5.1)  mmol/L


 


Chloride     ()  mmol/L


 


Carbon Dioxide   <5 L*   (22-30)  mmol/L


 


BUN   72 H   (9-20)  mg/dL


 


Creatinine   11.21 H*   (0.66-1.25)  mg/dL


 


Glucose   197 H   (74-99)  mg/dL


 


POC Glucose (mg/dL)     (75-99)  mg/dL


 


Plasma Lactic Acid Malick    >24.0 H*  (0.7-2.0)  mmol/L


 


Calcium     (8.4-10.2)  mg/dL


 


Phosphorus     (2.5-4.5)  mg/dL


 


AST     (17-59)  U/L


 


Alkaline Phosphatase     ()  U/L


 


Total Protein     (6.3-8.2)  g/dL


 


Procalcitonin     (0.02-0.09)  ng/mL


 


Urine Protein  2+ H    (Negative)  


 


Urine Glucose (UA)  Trace H    (Negative)  


 


Urine Ketones  1+ H    (Negative)  


 


Urine Blood  Moderate H    (Negative)  


 


Ur Leukocyte Esterase  Large H    (Negative)  


 


Urine RBC  >182 H    (0-5)  /hpf


 


Urine WBC  95 H    (0-5)  /hpf


 


Urine Bacteria  Rare H    (None)  /hpf


 


Urine Mucus  Rare H    (None)  /hpf


 


Urine Yeast (Budding)  Few H    (None)  /hpf














  10/12/21 10/12/21 10/12/21 Range/Units





  14:40 15:41 18:40 


 


WBC     (3.8-10.6)  k/uL


 


RBC     (4.30-5.90)  m/uL


 


Hgb     (13.0-17.5)  gm/dL


 


Hct     (39.0-53.0)  %


 


MCV     (80.0-100.0)  fL


 


MCHC     (31.0-37.0)  g/dL


 


Neutrophils #     (1.3-7.7)  k/uL


 


Neutrophils # (Manual)     (1.3-7.7)  k/uL


 


Monocytes #     (0-1.0)  k/uL


 


Metamyelocytes # (Man)     (0)  k/uL


 


Macrocytosis     


 


Sodium    146 H  (137-145)  mmol/L


 


Potassium    5.9 H  (3.5-5.1)  mmol/L


 


Chloride     ()  mmol/L


 


Carbon Dioxide    <5 L*  (22-30)  mmol/L


 


BUN    72 H  (9-20)  mg/dL


 


Creatinine    10.84 H*  (0.66-1.25)  mg/dL


 


Glucose    249 H  (74-99)  mg/dL


 


POC Glucose (mg/dL)  209 H    (75-99)  mg/dL


 


Plasma Lactic Acid Malick   >24.0 H*   (0.7-2.0)  mmol/L


 


Calcium    7.9 L  (8.4-10.2)  mg/dL


 


Phosphorus    13.2 H*  (2.5-4.5)  mg/dL


 


AST     (17-59)  U/L


 


Alkaline Phosphatase     ()  U/L


 


Total Protein     (6.3-8.2)  g/dL


 


Procalcitonin     (0.02-0.09)  ng/mL


 


Urine Protein     (Negative)  


 


Urine Glucose (UA)     (Negative)  


 


Urine Ketones     (Negative)  


 


Urine Blood     (Negative)  


 


Ur Leukocyte Esterase     (Negative)  


 


Urine RBC     (0-5)  /hpf


 


Urine WBC     (0-5)  /hpf


 


Urine Bacteria     (None)  /hpf


 


Urine Mucus     (None)  /hpf


 


Urine Yeast (Budding)     (None)  /hpf














  10/12/21 10/12/21 10/12/21 Range/Units





  18:40 18:40 18:44 


 


WBC     (3.8-10.6)  k/uL


 


RBC     (4.30-5.90)  m/uL


 


Hgb     (13.0-17.5)  gm/dL


 


Hct     (39.0-53.0)  %


 


MCV     (80.0-100.0)  fL


 


MCHC     (31.0-37.0)  g/dL


 


Neutrophils #     (1.3-7.7)  k/uL


 


Neutrophils # (Manual)     (1.3-7.7)  k/uL


 


Monocytes #     (0-1.0)  k/uL


 


Metamyelocytes # (Man)     (0)  k/uL


 


Macrocytosis     


 


Sodium     (137-145)  mmol/L


 


Potassium     (3.5-5.1)  mmol/L


 


Chloride     ()  mmol/L


 


Carbon Dioxide     (22-30)  mmol/L


 


BUN     (9-20)  mg/dL


 


Creatinine     (0.66-1.25)  mg/dL


 


Glucose     (74-99)  mg/dL


 


POC Glucose (mg/dL)    261 H  (75-99)  mg/dL


 


Plasma Lactic Acid Malick   22.7 H*   (0.7-2.0)  mmol/L


 


Calcium     (8.4-10.2)  mg/dL


 


Phosphorus     (2.5-4.5)  mg/dL


 


AST     (17-59)  U/L


 


Alkaline Phosphatase     ()  U/L


 


Total Protein     (6.3-8.2)  g/dL


 


Procalcitonin  1.57 H    (0.02-0.09)  ng/mL


 


Urine Protein     (Negative)  


 


Urine Glucose (UA)     (Negative)  


 


Urine Ketones     (Negative)  


 


Urine Blood     (Negative)  


 


Ur Leukocyte Esterase     (Negative)  


 


Urine RBC     (0-5)  /hpf


 


Urine WBC     (0-5)  /hpf


 


Urine Bacteria     (None)  /hpf


 


Urine Mucus     (None)  /hpf


 


Urine Yeast (Budding)     (None)  /hpf














  10/12/21 10/12/21 10/13/21 Range/Units





  21:55 22:51 00:39 


 


WBC    30.5 H  (3.8-10.6)  k/uL


 


RBC    3.36 L  (4.30-5.90)  m/uL


 


Hgb    11.0 L  (13.0-17.5)  gm/dL


 


Hct    36.9 L  (39.0-53.0)  %


 


MCV    109.7 H D  (80.0-100.0)  fL


 


MCHC    29.8 L  (31.0-37.0)  g/dL


 


Neutrophils #     (1.3-7.7)  k/uL


 


Neutrophils # (Manual)    25.30 H  (1.3-7.7)  k/uL


 


Monocytes #     (0-1.0)  k/uL


 


Metamyelocytes # (Man)    0.61 H  (0)  k/uL


 


Macrocytosis    Marked A  


 


Sodium     (137-145)  mmol/L


 


Potassium     (3.5-5.1)  mmol/L


 


Chloride     ()  mmol/L


 


Carbon Dioxide     (22-30)  mmol/L


 


BUN     (9-20)  mg/dL


 


Creatinine     (0.66-1.25)  mg/dL


 


Glucose     (74-99)  mg/dL


 


POC Glucose (mg/dL)   374 H   (75-99)  mg/dL


 


Plasma Lactic Acid Malick  23.5 H*    (0.7-2.0)  mmol/L


 


Calcium     (8.4-10.2)  mg/dL


 


Phosphorus     (2.5-4.5)  mg/dL


 


AST     (17-59)  U/L


 


Alkaline Phosphatase     ()  U/L


 


Total Protein     (6.3-8.2)  g/dL


 


Procalcitonin     (0.02-0.09)  ng/mL


 


Urine Protein     (Negative)  


 


Urine Glucose (UA)     (Negative)  


 


Urine Ketones     (Negative)  


 


Urine Blood     (Negative)  


 


Ur Leukocyte Esterase     (Negative)  


 


Urine RBC     (0-5)  /hpf


 


Urine WBC     (0-5)  /hpf


 


Urine Bacteria     (None)  /hpf


 


Urine Mucus     (None)  /hpf


 


Urine Yeast (Budding)     (None)  /hpf














  10/13/21 10/13/21 10/13/21 Range/Units





  00:39 00:39 04:21 


 


WBC    19.6 H  (3.8-10.6)  k/uL


 


RBC    2.94 L  (4.30-5.90)  m/uL


 


Hgb    9.7 L  (13.0-17.5)  gm/dL


 


Hct    30.4 L  (39.0-53.0)  %


 


MCV    103.4 H D  (80.0-100.0)  fL


 


MCHC     (31.0-37.0)  g/dL


 


Neutrophils #    16.7 H  (1.3-7.7)  k/uL


 


Neutrophils # (Manual)     (1.3-7.7)  k/uL


 


Monocytes #    1.4 H  (0-1.0)  k/uL


 


Metamyelocytes # (Man)     (0)  k/uL


 


Macrocytosis     


 


Sodium     (137-145)  mmol/L


 


Potassium  6.2 H*    (3.5-5.1)  mmol/L


 


Chloride     ()  mmol/L


 


Carbon Dioxide  6 L*    (22-30)  mmol/L


 


BUN  75 H    (9-20)  mg/dL


 


Creatinine  10.74 H*    (0.66-1.25)  mg/dL


 


Glucose  348 H    (74-99)  mg/dL


 


POC Glucose (mg/dL)     (75-99)  mg/dL


 


Plasma Lactic Acid Malick   19.7 H*   (0.7-2.0)  mmol/L


 


Calcium  7.6 L    (8.4-10.2)  mg/dL


 


Phosphorus  11.8 H*    (2.5-4.5)  mg/dL


 


AST  79 H    (17-59)  U/L


 


Alkaline Phosphatase  32 L    ()  U/L


 


Total Protein  5.4 L    (6.3-8.2)  g/dL


 


Procalcitonin     (0.02-0.09)  ng/mL


 


Urine Protein     (Negative)  


 


Urine Glucose (UA)     (Negative)  


 


Urine Ketones     (Negative)  


 


Urine Blood     (Negative)  


 


Ur Leukocyte Esterase     (Negative)  


 


Urine RBC     (0-5)  /hpf


 


Urine WBC     (0-5)  /hpf


 


Urine Bacteria     (None)  /hpf


 


Urine Mucus     (None)  /hpf


 


Urine Yeast (Budding)     (None)  /hpf














  10/13/21 10/13/21 10/13/21 Range/Units





  04:21 04:21 06:32 


 


WBC     (3.8-10.6)  k/uL


 


RBC     (4.30-5.90)  m/uL


 


Hgb     (13.0-17.5)  gm/dL


 


Hct     (39.0-53.0)  %


 


MCV     (80.0-100.0)  fL


 


MCHC     (31.0-37.0)  g/dL


 


Neutrophils #     (1.3-7.7)  k/uL


 


Neutrophils # (Manual)     (1.3-7.7)  k/uL


 


Monocytes #     (0-1.0)  k/uL


 


Metamyelocytes # (Man)     (0)  k/uL


 


Macrocytosis     


 


Sodium  148 H    (137-145)  mmol/L


 


Potassium  5.4 H    (3.5-5.1)  mmol/L


 


Chloride  97 L    ()  mmol/L


 


Carbon Dioxide  11 L    (22-30)  mmol/L


 


BUN  81 H    (9-20)  mg/dL


 


Creatinine  10.25 H*    (0.66-1.25)  mg/dL


 


Glucose  375 H    (74-99)  mg/dL


 


POC Glucose (mg/dL)    382 H  (75-99)  mg/dL


 


Plasma Lactic Acid Malick   17.3 H*   (0.7-2.0)  mmol/L


 


Calcium  7.1 L    (8.4-10.2)  mg/dL


 


Phosphorus  8.4 H    (2.5-4.5)  mg/dL


 


AST     (17-59)  U/L


 


Alkaline Phosphatase     ()  U/L


 


Total Protein     (6.3-8.2)  g/dL


 


Procalcitonin     (0.02-0.09)  ng/mL


 


Urine Protein     (Negative)  


 


Urine Glucose (UA)     (Negative)  


 


Urine Ketones     (Negative)  


 


Urine Blood     (Negative)  


 


Ur Leukocyte Esterase     (Negative)  


 


Urine RBC     (0-5)  /hpf


 


Urine WBC     (0-5)  /hpf


 


Urine Bacteria     (None)  /hpf


 


Urine Mucus     (None)  /hpf


 


Urine Yeast (Budding)     (None)  /hpf














  10/13/21 Range/Units





  08:24 


 


WBC   (3.8-10.6)  k/uL


 


RBC   (4.30-5.90)  m/uL


 


Hgb   (13.0-17.5)  gm/dL


 


Hct   (39.0-53.0)  %


 


MCV   (80.0-100.0)  fL


 


MCHC   (31.0-37.0)  g/dL


 


Neutrophils #   (1.3-7.7)  k/uL


 


Neutrophils # (Manual)   (1.3-7.7)  k/uL


 


Monocytes #   (0-1.0)  k/uL


 


Metamyelocytes # (Man)   (0)  k/uL


 


Macrocytosis   


 


Sodium   (137-145)  mmol/L


 


Potassium   (3.5-5.1)  mmol/L


 


Chloride   ()  mmol/L


 


Carbon Dioxide   (22-30)  mmol/L


 


BUN   (9-20)  mg/dL


 


Creatinine   (0.66-1.25)  mg/dL


 


Glucose   (74-99)  mg/dL


 


POC Glucose (mg/dL)   (75-99)  mg/dL


 


Plasma Lactic Acid Malick  13.8 H*  (0.7-2.0)  mmol/L


 


Calcium   (8.4-10.2)  mg/dL


 


Phosphorus   (2.5-4.5)  mg/dL


 


AST   (17-59)  U/L


 


Alkaline Phosphatase   ()  U/L


 


Total Protein   (6.3-8.2)  g/dL


 


Procalcitonin   (0.02-0.09)  ng/mL


 


Urine Protein   (Negative)  


 


Urine Glucose (UA)   (Negative)  


 


Urine Ketones   (Negative)  


 


Urine Blood   (Negative)  


 


Ur Leukocyte Esterase   (Negative)  


 


Urine RBC   (0-5)  /hpf


 


Urine WBC   (0-5)  /hpf


 


Urine Bacteria   (None)  /hpf


 


Urine Mucus   (None)  /hpf


 


Urine Yeast (Budding)   (None)  /hpf








                      Microbiology - Last 24 Hours (Table)











 10/12/21 13:57 Gram Stain - Preliminary





 Aspirate Body Fluid Culture - Preliminary


 


 10/12/21 13:57 Anaerobic Culture - Preliminary





 Aspirate 


 


 10/12/21 10:35 Urine Culture - Preliminary





 Urine,Clean Catch 














Assessment and Plan


Assessment: 





Acute kidney injury, secondary to bilateral renal calculi, and bilateral 

hydronephrosis.





Routine postoperative ventilator management, with extubation successfully on 

10/12/2021.





Postop day #1, status post bilateral ureteral stents.





Severe anion gap metabolic acidosis, secondary to acute kidney injury/acute 

renal failure.





Acute lactic acidosis.





Acute hyperkalemia.





Rule out sepsis.





Anuria/oliguria.





History of diabetes mellitus.





History of hyperlipidemia.





History of hypertension.





Prior history of kidney stones.





History of asthma.


Plan: 





Plan dated 10/12/2021.





The patient will be transferred to the intensive care unit for further 

monitoring and management.  The patient is to go to the operating room for 

bilateral stent placement, given his bilateral renal calculi and bilateral 

hydronephrosis.  Blood cultures and urine sampling should be done.  Additional 

recommendations and suggestions are forthcoming.  If not already done, 

nephrology should be consulted.  We will continue to follow along and make 

recommendations where appropriate.  I did talk to the patient's wife and the 

patient today.  Additional recommendations and suggestions are forthcoming.





Plan dated 10/13/2021.





The patient was successfully extubated yesterday, October 12 in the intensive 

care unit.  He's postop day #1, status post bilateral ureteral stents.  The 

patient is on IV Rocephin.  He is getting O2 at 3 L.  For her severe metabolic 

acidosis, he is on D5W IV, with 3 ampules of sodium bicarbonate, at 150 mL an 

hour.  Clinically, he is doing well.  He is awake and alert.  The patient 

remains on norepinephrine at 9 mcg/m.  I did ask the nurse to check a TSH, and a

random cortisol level.  Prognosis is guarded.  We will continue to follow the 

patient and make recommendations where appropriate.


Time with Patient: Greater than 30

## 2021-10-14 LAB
ANION GAP SERPL CALC-SCNC: 15 MMOL/L
ANION GAP SERPL CALC-SCNC: 18 MMOL/L
BASOPHILS # BLD AUTO: 0 K/UL (ref 0–0.2)
BASOPHILS NFR BLD AUTO: 0 %
BUN SERPL-SCNC: 95 MG/DL (ref 9–20)
BUN SERPL-SCNC: 95 MG/DL (ref 9–20)
CALCIUM SPEC-MCNC: 6.2 MG/DL (ref 8.4–10.2)
CALCIUM SPEC-MCNC: 6.6 MG/DL (ref 8.4–10.2)
CHLORIDE SERPL-SCNC: 93 MMOL/L (ref 98–107)
CHLORIDE SERPL-SCNC: 94 MMOL/L (ref 98–107)
CO2 SERPL-SCNC: 34 MMOL/L (ref 22–30)
CO2 SERPL-SCNC: 37 MMOL/L (ref 22–30)
EOSINOPHIL # BLD AUTO: 0.1 K/UL (ref 0–0.7)
EOSINOPHIL NFR BLD AUTO: 1 %
ERYTHROCYTE [DISTWIDTH] IN BLOOD BY AUTOMATED COUNT: 3.05 M/UL (ref 4.3–5.9)
ERYTHROCYTE [DISTWIDTH] IN BLOOD: 13.3 % (ref 11.5–15.5)
GLUCOSE BLD-MCNC: 108 MG/DL (ref 75–99)
GLUCOSE BLD-MCNC: 115 MG/DL (ref 75–99)
GLUCOSE BLD-MCNC: 175 MG/DL (ref 75–99)
GLUCOSE SERPL-MCNC: 122 MG/DL (ref 74–99)
GLUCOSE SERPL-MCNC: 99 MG/DL (ref 74–99)
HCT VFR BLD AUTO: 28.9 % (ref 39–53)
HGB BLD-MCNC: 10 GM/DL (ref 13–17.5)
LYMPHOCYTES # SPEC AUTO: 0.7 K/UL (ref 1–4.8)
LYMPHOCYTES NFR SPEC AUTO: 7 %
MAGNESIUM SPEC-SCNC: 1.7 MG/DL (ref 1.6–2.3)
MCH RBC QN AUTO: 32.6 PG (ref 25–35)
MCHC RBC AUTO-ENTMCNC: 34.4 G/DL (ref 31–37)
MCV RBC AUTO: 94.9 FL (ref 80–100)
MONOCYTES # BLD AUTO: 0.8 K/UL (ref 0–1)
MONOCYTES NFR BLD AUTO: 9 %
NEUTROPHILS # BLD AUTO: 7.5 K/UL (ref 1.3–7.7)
NEUTROPHILS NFR BLD AUTO: 81 %
PLATELET # BLD AUTO: 180 K/UL (ref 150–450)
POTASSIUM SERPL-SCNC: 3.8 MMOL/L (ref 3.5–5.1)
POTASSIUM SERPL-SCNC: 3.8 MMOL/L (ref 3.5–5.1)
SODIUM SERPL-SCNC: 145 MMOL/L (ref 137–145)
SODIUM SERPL-SCNC: 146 MMOL/L (ref 137–145)
WBC # BLD AUTO: 9.2 K/UL (ref 3.8–10.6)

## 2021-10-14 PROCEDURE — 06HY33Z INSERTION OF INFUSION DEVICE INTO LOWER VEIN, PERCUTANEOUS APPROACH: ICD-10-PCS

## 2021-10-14 RX ADMIN — SODIUM BICARBONATE SCH MLS/HR: 84 INJECTION, SOLUTION INTRAVENOUS at 05:19

## 2021-10-14 RX ADMIN — INSULIN ASPART SCH: 100 INJECTION, SOLUTION INTRAVENOUS; SUBCUTANEOUS at 12:21

## 2021-10-14 RX ADMIN — Medication SCH MG: at 17:25

## 2021-10-14 RX ADMIN — NOREPINEPHRINE BITARTRATE SCH MLS/HR: 1 INJECTION, SOLUTION, CONCENTRATE INTRAVENOUS at 15:03

## 2021-10-14 RX ADMIN — INSULIN ASPART SCH: 100 INJECTION, SOLUTION INTRAVENOUS; SUBCUTANEOUS at 06:55

## 2021-10-14 RX ADMIN — Medication SCH MG: at 12:36

## 2021-10-14 RX ADMIN — SODIUM BICARBONATE SCH MLS/HR: 84 INJECTION, SOLUTION INTRAVENOUS at 21:31

## 2021-10-14 RX ADMIN — SODIUM BICARBONATE SCH MLS/HR: 84 INJECTION, SOLUTION INTRAVENOUS at 15:02

## 2021-10-14 RX ADMIN — INSULIN DETEMIR SCH UNIT: 100 INJECTION, SOLUTION SUBCUTANEOUS at 21:31

## 2021-10-14 RX ADMIN — INSULIN ASPART SCH UNIT: 100 INJECTION, SOLUTION INTRAVENOUS; SUBCUTANEOUS at 17:25

## 2021-10-14 NOTE — P.PN
Subjective





Patient is seen in follow-up for acute kidney injury.  Urine output about 100 mL

an hour however creatinine 9.16 today.  Patient is quite lethargic.  Blood 

pressure stable.  Not tolerating oral intake as of yet.





Vital signs are stable.


General: Lethargic.


HEENT: Head exam is unremarkable. 


LUNGS: Breath sounds decreased.


HEART: Rate and Rhythm are regular. 


ABDOMEN: Soft, no distention.


EXTREMITITES: No edema.





Objective





- Vital Signs


Vital signs: 


                                   Vital Signs











Temp  98.5 F   10/14/21 04:00


 


Pulse  84   10/14/21 07:00


 


Resp  16   10/14/21 07:00


 


BP  166/70   10/14/21 07:00


 


Pulse Ox  95   10/14/21 07:00








                                 Intake & Output











 10/13/21 10/14/21 10/14/21





 18:59 06:59 18:59


 


Intake Total 2792.306 1947.521 145


 


Output Total 1320 1975 175


 


Balance 1472.306 -27.479 -30


 


Weight  105.8 kg 


 


Intake:   


 


  IV 2040 690 20


 


    0.9 @20mls/hr 80  


 


    D5 at 20 160 240 20


 


    Dextrose 5% in Water 1, 1800 450 





    000 ml @ 150 mls/hr IV .   





    Q7H40M BOUCHRA with Sodium   





    Bicarb (1 Meq/ml) 150 ml   





    Rx#:[id number]   


 


  Intake, IV Titration 248.943 0873.521 125





  Amount   


 


    Magnesium Sulfate-D5w Pmx 100  





    1 gm In Dextrose/Water 1   





    100ml.bag @ 100 mls/hr   





    IVPB Q1H BOUCHRA Rx#:   





    [id number]   


 


    Norepinephrine 8 mg In 112.306 132.521 





    Sodium Chloride 0.9% 250   





    ml @ 0.05 MCG/KG/MIN 8.   





    996 mls/hr IV .Q24H BOUCHRA   





    Rx#:[id number]   


 


    Sodium Chloride 0.45% 1,  1125 125





    000 ml @ 125 mls/hr IV .   





    Q8H BOUCHRA Rx#:[id number]   


 


    cefTRIAXone 1 gm In 100  





    Sodium Chloride 0.9% 50   





    ml @ 100 mls/hr IVPB   





    Q24HR BOUCHRA Rx#:[id number]   


 


  Oral 440  


 


Output:   


 


  Urine 1320 1975 175


 


Other:   


 


  Voiding Method Indwelling Catheter Indwelling Catheter 














- Labs


CBC & Chem 7: 


                                 10/14/21 04:33





                                 10/14/21 04:33


Labs: 


                  Abnormal Lab Results - Last 24 Hours (Table)











  10/13/21 10/13/21 10/13/21 Range/Units





  08:24 11:42 12:06 


 


RBC     (4.30-5.90)  m/uL


 


Hgb     (13.0-17.5)  gm/dL


 


Hct     (39.0-53.0)  %


 


Lymphocytes #     (1.0-4.8)  k/uL


 


Sodium    147 H  (137-145)  mmol/L


 


Chloride    95 L  ()  mmol/L


 


Carbon Dioxide     (22-30)  mmol/L


 


BUN    91 H  (9-20)  mg/dL


 


Creatinine    9.72 H*  (0.66-1.25)  mg/dL


 


Glucose    300 H  (74-99)  mg/dL


 


POC Glucose (mg/dL)   323 H   (75-99)  mg/dL


 


Plasma Lactic Acid Malick  13.8 H*    (0.7-2.0)  mmol/L


 


Calcium    6.9 L  (8.4-10.2)  mg/dL


 


Phosphorus     (2.5-4.5)  mg/dL














  10/13/21 10/13/21 10/13/21 Range/Units





  12:06 15:51 16:58 


 


RBC     (4.30-5.90)  m/uL


 


Hgb     (13.0-17.5)  gm/dL


 


Hct     (39.0-53.0)  %


 


Lymphocytes #     (1.0-4.8)  k/uL


 


Sodium     (137-145)  mmol/L


 


Chloride     ()  mmol/L


 


Carbon Dioxide     (22-30)  mmol/L


 


BUN     (9-20)  mg/dL


 


Creatinine     (0.66-1.25)  mg/dL


 


Glucose     (74-99)  mg/dL


 


POC Glucose (mg/dL)    208 H  (75-99)  mg/dL


 


Plasma Lactic Acid Malick  10.7 H*  7.4 H*   (0.7-2.0)  mmol/L


 


Calcium     (8.4-10.2)  mg/dL


 


Phosphorus     (2.5-4.5)  mg/dL














  10/13/21 10/14/21 10/14/21 Range/Units





  19:40 04:33 04:33 


 


RBC   3.05 L   (4.30-5.90)  m/uL


 


Hgb   10.0 L   (13.0-17.5)  gm/dL


 


Hct   28.9 L   (39.0-53.0)  %


 


Lymphocytes #   0.7 L   (1.0-4.8)  k/uL


 


Sodium  146 H   146 H  (137-145)  mmol/L


 


Chloride  94 L   94 L  ()  mmol/L


 


Carbon Dioxide    34 H  (22-30)  mmol/L


 


BUN  96 H   95 H  (9-20)  mg/dL


 


Creatinine  9.34 H*   9.16 H*  (0.66-1.25)  mg/dL


 


Glucose  174 H    (74-99)  mg/dL


 


POC Glucose (mg/dL)     (75-99)  mg/dL


 


Plasma Lactic Acid Malick     (0.7-2.0)  mmol/L


 


Calcium  6.7 L   6.6 L  (8.4-10.2)  mg/dL


 


Phosphorus    7.3 H  (2.5-4.5)  mg/dL














  10/14/21 10/14/21 Range/Units





  06:54 08:30 


 


RBC    (4.30-5.90)  m/uL


 


Hgb    (13.0-17.5)  gm/dL


 


Hct    (39.0-53.0)  %


 


Lymphocytes #    (1.0-4.8)  k/uL


 


Sodium    (137-145)  mmol/L


 


Chloride    ()  mmol/L


 


Carbon Dioxide    (22-30)  mmol/L


 


BUN    (9-20)  mg/dL


 


Creatinine    (0.66-1.25)  mg/dL


 


Glucose    (74-99)  mg/dL


 


POC Glucose (mg/dL)  108 H  115 H  (75-99)  mg/dL


 


Plasma Lactic Acid Malick    (0.7-2.0)  mmol/L


 


Calcium    (8.4-10.2)  mg/dL


 


Phosphorus    (2.5-4.5)  mg/dL








                      Microbiology - Last 24 Hours (Table)











 10/12/21 13:57 Gram Stain - Preliminary





 Aspirate Body Fluid Culture - Preliminary


 


 10/12/21 10:35 Urine Culture - Final





 Urine,Clean Catch 


 


 10/12/21 18:40 Blood Culture - Preliminary





 Blood    No Growth after 24 hours














Assessment and Plan


Plan: 





Assessment:


1.  Acute kidney injury secondary to ATN secondary to obstructive uropathy and 

hypotension.  Creatinine was 11.6 on admission and is 9.16 this morning.  

Patient's creatinine the last 2 years has been in the range of 1.6-2.


2.  Bilateral hydronephrosis with nephrolithiasis status post ureteral stent 

placement on October 12.


3.  Hyperkalemia secondary to acute kidney injury, metabolic acidosis and 

lisinopril.  Improved.


4.  Metabolic acidosis secondary to acute kidney injury and lactic acidosis.  He

was also on metformin outpatient.  Status post bicarb drip.  Resolved.


5.  Diabetes mellitus.


6.  Hypernatremia from lack of oral water intake.  Stable.





Plan:


Maintain half-normal saline at 1 25 mL an hour.


Encourage oral intake, including free water.


Continue to monitor renal function and urine output.


Due to no significant improvement in renal function and concern for uremia, init

iate renal replacement therapy.


Plan for first treatment of hemodialysis today and another treatment tomorrow.


Monitor for renal recovery.

## 2021-10-14 NOTE — P.PN
Subjective


Progress Note Date: 10/14/21





Underwent bilateral ureteral stent placement on 10/12 , for bilateral 

hydronephrosis. UO improving, creatinine still elevated at 9.16.  Denies any 

flank pain. urine is clearing this am. 





Objective





- Vital Signs


Vital signs: 


                                   Vital Signs











Temp  98.5 F   10/14/21 04:00


 


Pulse  84   10/14/21 07:00


 


Resp  16   10/14/21 07:00


 


BP  166/70   10/14/21 07:00


 


Pulse Ox  95   10/14/21 07:00








                                 Intake & Output











 10/13/21 10/14/21 10/14/21





 18:59 06:59 18:59


 


Intake Total 2792.306 1947.521 341.718


 


Output Total 1320 1975 175


 


Balance 1472.306 -27.479 166.718


 


Weight  105.8 kg 


 


Intake:   


 


  IV 2040 690 20


 


    0.9 @20mls/hr 80  


 


    D5 at 20 160 240 20


 


    Dextrose 5% in Water 1, 1800 450 





    000 ml @ 150 mls/hr IV .   





    Q7H40M [facility] with Sodium   





    Bicarb (1 Meq/ml) 150 ml   





    Rx#:[id number]   


 


  Intake, IV Titration 093.182 1624.521 321.718





  Amount   


 


    Magnesium Sulfate-D5w Pmx 100  





    1 gm In Dextrose/Water 1   





    100ml.bag @ 100 mls/hr   





    IVPB Q1H [facility] Rx#:   





    [id number]   


 


    Norepinephrine 8 mg In 112.306 132.521 196.718





    Sodium Chloride 0.9% 250   





    ml @ 0.05 MCG/KG/MIN 8.   





    996 mls/hr IV .Q24H [facility]   





    Rx#:[id number]   


 


    Sodium Chloride 0.45% 1,  1125 125





    000 ml @ 125 mls/hr IV .   





    Q8H [facility] Rx#:[id number]   


 


    cefTRIAXone 1 gm In 100  





    Sodium Chloride 0.9% 50   





    ml @ 100 mls/hr IVPB   





    Q24HR [facility] Rx#:[id number]   


 


  Oral 440  


 


Output:   


 


  Urine 1320 1975 175


 


Other:   


 


  Voiding Method Indwelling Catheter Indwelling Catheter 














- Constitutional


General appearance: Present: no acute distress





- Genitourinary


Genitourinary Comment(s): 





urine light red 





- Psychiatric


Psychiatric: Present: A&O x's 3





- Labs


CBC & Chem 7: 


                                 10/14/21 04:33





                                 10/14/21 04:33


Labs: 


                  Abnormal Lab Results - Last 24 Hours (Table)











  10/13/21 10/13/21 10/13/21 Range/Units





  11:42 12:06 12:06 


 


RBC     (4.30-5.90)  m/uL


 


Hgb     (13.0-17.5)  gm/dL


 


Hct     (39.0-53.0)  %


 


Lymphocytes #     (1.0-4.8)  k/uL


 


Sodium   147 H   (137-145)  mmol/L


 


Chloride   95 L   ()  mmol/L


 


Carbon Dioxide     (22-30)  mmol/L


 


BUN   91 H   (9-20)  mg/dL


 


Creatinine   9.72 H*   (0.66-1.25)  mg/dL


 


Glucose   300 H   (74-99)  mg/dL


 


POC Glucose (mg/dL)  323 H    (75-99)  mg/dL


 


Plasma Lactic Acid Malick    10.7 H*  (0.7-2.0)  mmol/L


 


Calcium   6.9 L   (8.4-10.2)  mg/dL


 


Phosphorus     (2.5-4.5)  mg/dL














  10/13/21 10/13/21 10/13/21 Range/Units





  15:51 16:58 19:40 


 


RBC     (4.30-5.90)  m/uL


 


Hgb     (13.0-17.5)  gm/dL


 


Hct     (39.0-53.0)  %


 


Lymphocytes #     (1.0-4.8)  k/uL


 


Sodium    146 H  (137-145)  mmol/L


 


Chloride    94 L  ()  mmol/L


 


Carbon Dioxide     (22-30)  mmol/L


 


BUN    96 H  (9-20)  mg/dL


 


Creatinine    9.34 H*  (0.66-1.25)  mg/dL


 


Glucose    174 H  (74-99)  mg/dL


 


POC Glucose (mg/dL)   208 H   (75-99)  mg/dL


 


Plasma Lactic Acid Malick  7.4 H*    (0.7-2.0)  mmol/L


 


Calcium    6.7 L  (8.4-10.2)  mg/dL


 


Phosphorus     (2.5-4.5)  mg/dL














  10/14/21 10/14/21 10/14/21 Range/Units





  04:33 04:33 06:54 


 


RBC  3.05 L    (4.30-5.90)  m/uL


 


Hgb  10.0 L    (13.0-17.5)  gm/dL


 


Hct  28.9 L    (39.0-53.0)  %


 


Lymphocytes #  0.7 L    (1.0-4.8)  k/uL


 


Sodium   146 H   (137-145)  mmol/L


 


Chloride   94 L   ()  mmol/L


 


Carbon Dioxide   34 H   (22-30)  mmol/L


 


BUN   95 H   (9-20)  mg/dL


 


Creatinine   9.16 H*   (0.66-1.25)  mg/dL


 


Glucose     (74-99)  mg/dL


 


POC Glucose (mg/dL)    108 H  (75-99)  mg/dL


 


Plasma Lactic Acid Malick     (0.7-2.0)  mmol/L


 


Calcium   6.6 L   (8.4-10.2)  mg/dL


 


Phosphorus   7.3 H   (2.5-4.5)  mg/dL














  10/14/21 Range/Units





  08:30 


 


RBC   (4.30-5.90)  m/uL


 


Hgb   (13.0-17.5)  gm/dL


 


Hct   (39.0-53.0)  %


 


Lymphocytes #   (1.0-4.8)  k/uL


 


Sodium   (137-145)  mmol/L


 


Chloride   ()  mmol/L


 


Carbon Dioxide   (22-30)  mmol/L


 


BUN   (9-20)  mg/dL


 


Creatinine   (0.66-1.25)  mg/dL


 


Glucose   (74-99)  mg/dL


 


POC Glucose (mg/dL)  115 H  (75-99)  mg/dL


 


Plasma Lactic Acid Malick   (0.7-2.0)  mmol/L


 


Calcium   (8.4-10.2)  mg/dL


 


Phosphorus   (2.5-4.5)  mg/dL








                      Microbiology - Last 24 Hours (Table)











 10/12/21 13:57 Gram Stain - Preliminary





 Aspirate Body Fluid Culture - Preliminary


 


 10/12/21 10:35 Urine Culture - Final





 Urine,Clean Catch 


 


 10/12/21 18:40 Blood Culture - Preliminary





 Blood    No Growth after 24 hours














Assessment and Plan


Assessment: 





71-year-old male admitted to the hospital with renal failure, underwent 

bilateral ureteral stent placement on October 12.  urine output improving, 

creatinine still elevated at 9.16


-Irrigate Gibson PRN if urine output drops or gross hematuria worsens.  Urine was

blood tinged this morning consistent with recent some placement


-We'll continue to trend creatinine


-Follow up on urine culture, recommend continuing Cetrixone until cultures are 

finalized

## 2021-10-14 NOTE — OP
OPERATIVE REPORT



PREOPERATIVE DIAGNOSIS:

Acute on chronic renal failure.



POSTOPERATIVE DIAGNOSIS:

Acute on chronic renal failure.



PROCEDURE PERFORMED:

Ultrasound-guided dialysis catheter dialysis catheter placement via right femoral

approach.



PROCEDURE DESCRIPTION:

The patient was seen in his room.  Right groin was prepped and draped in usual sterile

manner. Lidocaine 1% plain was infiltrated.  Ultrasound-guided micropuncture was

introduced into the right femoral vein.  Then we passed a micro guidewire and 4-Finnish

sheath on top of the guidewire.  After that we passed a regular guide without any

resistance.  Then we passed a dilator and then placed a dialysis catheter on top of the

guidewire. Guidewire was removed, flushed with heparin saline and hep-locked and

secured with 3-0 nylon. Dressing was applied. Patient tolerated the procedure well.





MARCIE / JOHNNAN: [id number] / Job#: [id number]

## 2021-10-14 NOTE — P.PN
Subjective


Progress Note Date: 10/14/21





Pulmonary/critical care consultation dated 10/12/2021.





71-year-old male, who presents to the emergency department at 0652 in the 

morning on October 12.  He was brought in by his wife.  This patient for the 

last week to 10 days has not been feeling well.  He's had significant weakness, 

fatigue, nausea, and vomiting.  According to his wife, the patient became very 

weak, and really could not balance himself or walk properly.  For that reason, 

she brought in to be evaluated.  In addition, the patient has not urinated in 

the last 24 hours.  He had some mental status changes and obviously was very 

concerned.  We were asked to see the patient for possible admission to the 

intensive care unit.  The patient was hypotensive.  In addition, the patient had

developed acute kidney injury/acute renal failure, and was found on CAT scan to 

have kidney stones.  He may need a stent placement according to the ER 

physician.  His medical history includes asthma, diabetes, hyperlipidemia, 

hypertension, kidney stones, umbilical hernia, and recent coronavirus infection.

 The patient is a lifelong nonsmoker.  There is a family history of deep venous 

thrombosis.  White count was 15.8, hemoglobin 11.5, hematocrit 37.2, platelet 

count 219,000.  Sodium 150, potassium 5.1, chlorides 101, CO2 less than 5, anion

gap was 38, BUN 72, and creatinine 11.21.  Lactic acid was 14.6.  CT of the 

abdomen and pelvis showed new mild right-sided hydronephrosis and right 

perinephritic fat stranding with interval passage of a small lower pole right 

renal calculi into the bladder.  There is persistent moderate left-sided hydro

nephrosis due to obstructing distal ureter calculi.  It should also be noted 

that the patient was here about a week ago, hydrated in the emergency 

department, and discharged home.  The patient is to go to the operating room for

bilateral stent placement.





Progress note dated 10/13/2021.





71-year-old white male seen yesterday in the emergency department.  The patient 

went to the operating room yesterday, had bilateral ureteral stents.  He was 

successfully extubated on October 12.  Today's postop day #1.  Currently, he's 

on 3 L nasal cannula, IV Rocephin, and an IV of D5W with 3 ampules of sodium 

bicarbonate at 1 50 mL an hour.  The patient is getting saline at 20 mL now 

which I told the nurse to discontinue.  In addition, the patient is on 

norepinephrine at 9 mcg/m.  Clinically he appears to be a bit sleepy but does 

arouse and is appropriate.  White count is 19.6, hemoglobin 9.7, hematocrit 

30.4, and platelet count 215,000 sodium is 148, potassium 5.4, chlorides 97, CO2

11, anion gap is 4, BUN is 81, creatinine is 10.25.  Plasma lactic acid is 13.8.

 Magnesium 1.6.








The patient is seen today 10/14/2021 in follow-up in the intensive care unit.  

He is currently sitting up in bed.  Arouses to verbal stimuli but drowsy. 

Somewhat slow to respond. He is maintaining O2 saturations in the 90s on 2 L/m 

per nasal cannula.  He has 0.45% normal saline running at 125 ML's per hour.  He

is still requiring norepinephrine at 8.4 mcg/m.  He is on antibiotics in the 

form of ceftriaxone.  Urine culture reveals no growth.  Blood cultures reveal no

growth to date.  White count 9.2.  Hemoglobin 10.0.  Platelets 180.  Sodium 146.

 Potassium 3.8.  Bicarb 34.  Creatinine 9.16.  He is making urine at about 100 

ML's per hour.  The plan is for possible renal replacement therapy today.





Objective





- Vital Signs


Vital signs: 


                                   Vital Signs











Temp  98.5 F   10/14/21 04:00


 


Pulse  84   10/14/21 07:00


 


Resp  16   10/14/21 07:00


 


BP  166/70   10/14/21 07:00


 


Pulse Ox  95   10/14/21 07:00








                                 Intake & Output











 10/13/21 10/14/21 10/14/21





 18:59 06:59 18:59


 


Intake Total 2792.306 1947.521 145


 


Output Total 1320 1975 175


 


Balance 1472.306 -27.479 -30


 


Weight  105.8 kg 


 


Intake:   


 


  IV 2040 690 20


 


    0.9 @20mls/hr 80  


 


    D5 at 20 160 240 20


 


    Dextrose 5% in Water 1, 1800 450 





    000 ml @ 150 mls/hr IV .   





    Q7H40M [facility]RA with Sodium   





    Bicarb (1 Meq/ml) 150 ml   





    Rx#:[id number]   


 


  Intake, IV Titration 712.093 7661.521 125





  Amount   


 


    Magnesium Sulfate-D5w Pmx 100  





    1 gm In Dextrose/Water 1   





    100ml.bag @ 100 mls/hr   





    IVPB Q1H [facility] Rx#:   





    [id number]   


 


    Norepinephrine 8 mg In 112.306 132.521 





    Sodium Chloride 0.9% 250   





    ml @ 0.05 MCG/KG/MIN 8.   





    996 mls/hr IV .Q24H [facility]   





    Rx#:[id number]   


 


    Sodium Chloride 0.45% 1,  1125 125





    000 ml @ 125 mls/hr IV .   





    Q8H [facility] Rx#:[id number]   


 


    cefTRIAXone 1 gm In 100  





    Sodium Chloride 0.9% 50   





    ml @ 100 mls/hr IVPB   





    Q24HR [facility] Rx#:[id number]   


 


  Oral 440  


 


Output:   


 


  Urine 1320 1975 175


 


Other:   


 


  Voiding Method Indwelling Catheter Indwelling Catheter 














- Exam





GENERAL EXAM: Arousable but drowsy 71-year-old male patient, lethargic, on 2 L 

nasal cannula, fairly comfortable in no apparent distress.


HEAD: Normocephalic.


EYES: Normal reaction of pupils, equal size.


NOSE: Clear with pink turbinates.


THROAT: No erythema or exudates.


NECK: No masses, no JVD.


CHEST: No chest wall deformity.


LUNGS: Equal air entry with faint crackles in the posterior bases.


CVS: S1 and S2 normal with no audible murmur, regular rhythm.


ABDOMEN: No hepatosplenomegaly, normal bowel sounds, no guarding or rigidity.


SPINE: No scoliosis or deformity


SKIN: No rashes


CENTRAL NERVOUS SYSTEM: No focal deficits, tone is normal in all 4 extremities.


EXTREMITIES: There is no peripheral edema.  No clubbing, no cyanosis.  

Peripheral pulses are intact.





- Labs


CBC & Chem 7: 


                                 10/14/21 04:33





                                 10/14/21 04:33


Labs: 


                  Abnormal Lab Results - Last 24 Hours (Table)











  10/13/21 10/13/21 10/13/21 Range/Units





  08:24 11:42 12:06 


 


RBC     (4.30-5.90)  m/uL


 


Hgb     (13.0-17.5)  gm/dL


 


Hct     (39.0-53.0)  %


 


Lymphocytes #     (1.0-4.8)  k/uL


 


Sodium    147 H  (137-145)  mmol/L


 


Chloride    95 L  ()  mmol/L


 


Carbon Dioxide     (22-30)  mmol/L


 


BUN    91 H  (9-20)  mg/dL


 


Creatinine    9.72 H*  (0.66-1.25)  mg/dL


 


Glucose    300 H  (74-99)  mg/dL


 


POC Glucose (mg/dL)   323 H   (75-99)  mg/dL


 


Plasma Lactic Acid Malick  13.8 H*    (0.7-2.0)  mmol/L


 


Calcium    6.9 L  (8.4-10.2)  mg/dL


 


Phosphorus     (2.5-4.5)  mg/dL














  10/13/21 10/13/21 10/13/21 Range/Units





  12:06 15:51 16:58 


 


RBC     (4.30-5.90)  m/uL


 


Hgb     (13.0-17.5)  gm/dL


 


Hct     (39.0-53.0)  %


 


Lymphocytes #     (1.0-4.8)  k/uL


 


Sodium     (137-145)  mmol/L


 


Chloride     ()  mmol/L


 


Carbon Dioxide     (22-30)  mmol/L


 


BUN     (9-20)  mg/dL


 


Creatinine     (0.66-1.25)  mg/dL


 


Glucose     (74-99)  mg/dL


 


POC Glucose (mg/dL)    208 H  (75-99)  mg/dL


 


Plasma Lactic Acid Malick  10.7 H*  7.4 H*   (0.7-2.0)  mmol/L


 


Calcium     (8.4-10.2)  mg/dL


 


Phosphorus     (2.5-4.5)  mg/dL














  10/13/21 10/14/21 10/14/21 Range/Units





  19:40 04:33 04:33 


 


RBC   3.05 L   (4.30-5.90)  m/uL


 


Hgb   10.0 L   (13.0-17.5)  gm/dL


 


Hct   28.9 L   (39.0-53.0)  %


 


Lymphocytes #   0.7 L   (1.0-4.8)  k/uL


 


Sodium  146 H   146 H  (137-145)  mmol/L


 


Chloride  94 L   94 L  ()  mmol/L


 


Carbon Dioxide    34 H  (22-30)  mmol/L


 


BUN  96 H   95 H  (9-20)  mg/dL


 


Creatinine  9.34 H*   9.16 H*  (0.66-1.25)  mg/dL


 


Glucose  174 H    (74-99)  mg/dL


 


POC Glucose (mg/dL)     (75-99)  mg/dL


 


Plasma Lactic Acid Malick     (0.7-2.0)  mmol/L


 


Calcium  6.7 L   6.6 L  (8.4-10.2)  mg/dL


 


Phosphorus    7.3 H  (2.5-4.5)  mg/dL














  10/14/21 10/14/21 Range/Units





  06:54 08:30 


 


RBC    (4.30-5.90)  m/uL


 


Hgb    (13.0-17.5)  gm/dL


 


Hct    (39.0-53.0)  %


 


Lymphocytes #    (1.0-4.8)  k/uL


 


Sodium    (137-145)  mmol/L


 


Chloride    ()  mmol/L


 


Carbon Dioxide    (22-30)  mmol/L


 


BUN    (9-20)  mg/dL


 


Creatinine    (0.66-1.25)  mg/dL


 


Glucose    (74-99)  mg/dL


 


POC Glucose (mg/dL)  108 H  115 H  (75-99)  mg/dL


 


Plasma Lactic Acid Malick    (0.7-2.0)  mmol/L


 


Calcium    (8.4-10.2)  mg/dL


 


Phosphorus    (2.5-4.5)  mg/dL








                      Microbiology - Last 24 Hours (Table)











 10/12/21 13:57 Gram Stain - Preliminary





 Aspirate Body Fluid Culture - Preliminary


 


 10/12/21 10:35 Urine Culture - Final





 Urine,Clean Catch 


 


 10/12/21 18:40 Blood Culture - Preliminary





 Blood    No Growth after 24 hours














Assessment and Plan


Assessment: 





1 Acute kidney injury, secondary to bilateral renal calculi, and bilateral 

hydronephrosis.  No significant improvement, plan is for renal replacement 

therapy today





2 Routine postoperative ventilator management, with extubation successfully on 

10/12/2021.  Currently on 2 L nasal cannula





3 Postop day #2, status post bilateral ureteral stents.





4 Severe anion gap metabolic acidosis, secondary to acute kidney injury/acute 

renal failure.





5 Hypotension secondary to sepsis, currently on norepinephrine





6 Acute hyperkalemia improved.





7 Metabolic/septic encephalopathy.





8 Anuria/oliguria.





9 History of diabetes mellitus.





10 History of hyperlipidemia.





11 History of hypertension.





12 Prior history of kidney stones.





13 History of asthma.





Plan:





The patient was seen and evaluated by Dr. Pavon


Currently stable from the pulmonary standpoint


Plan is for renal replacement therapy to start possibly today


Status post bilateral ureteral stents


Continued on ceftriaxone


Titrate the FiO2 as tolerated


Titrate the pressors as tolerated 


We will continue to follow and make further recommendations based on his 

clinical status





I, the cosigning physician, performed a history & physical examination of the 

patient. Lungs sounds faint crackles in the posterior bases.  Maintaining good 

O2 saturations in the 90s on 2 L/m per nasal cannula  I discussed the assessment

and plan of care with my nurse practitioner, Vera Atkins. I attest to the above 

note as dictated by her.

## 2021-10-15 LAB
ALBUMIN SERPL-MCNC: 2.5 G/DL (ref 3.5–5)
ANION GAP SERPL CALC-SCNC: 9 MMOL/L
BASOPHILS # BLD AUTO: 0 K/UL (ref 0–0.2)
BASOPHILS NFR BLD AUTO: 0 %
BUN SERPL-SCNC: 68 MG/DL (ref 9–20)
CALCIUM SPEC-MCNC: 6 MG/DL (ref 8.4–10.2)
CHLORIDE SERPL-SCNC: 97 MMOL/L (ref 98–107)
CO2 SERPL-SCNC: 33 MMOL/L (ref 22–30)
EOSINOPHIL # BLD AUTO: 0.2 K/UL (ref 0–0.7)
EOSINOPHIL NFR BLD AUTO: 4 %
ERYTHROCYTE [DISTWIDTH] IN BLOOD BY AUTOMATED COUNT: 2.7 M/UL (ref 4.3–5.9)
ERYTHROCYTE [DISTWIDTH] IN BLOOD: 12.2 % (ref 11.5–15.5)
GLUCOSE BLD-MCNC: 134 MG/DL (ref 75–99)
GLUCOSE BLD-MCNC: 176 MG/DL (ref 75–99)
GLUCOSE SERPL-MCNC: 126 MG/DL (ref 74–99)
HCT VFR BLD AUTO: 26.5 % (ref 39–53)
HGB BLD-MCNC: 8.7 GM/DL (ref 13–17.5)
LYMPHOCYTES # SPEC AUTO: 0.5 K/UL (ref 1–4.8)
LYMPHOCYTES NFR SPEC AUTO: 9 %
MCH RBC QN AUTO: 32.3 PG (ref 25–35)
MCHC RBC AUTO-ENTMCNC: 32.8 G/DL (ref 31–37)
MCV RBC AUTO: 98.4 FL (ref 80–100)
MONOCYTES # BLD AUTO: 0.4 K/UL (ref 0–1)
MONOCYTES NFR BLD AUTO: 7 %
NEUTROPHILS # BLD AUTO: 4.6 K/UL (ref 1.3–7.7)
NEUTROPHILS NFR BLD AUTO: 79 %
PLATELET # BLD AUTO: 106 K/UL (ref 150–450)
POTASSIUM SERPL-SCNC: 3.2 MMOL/L (ref 3.5–5.1)
SODIUM SERPL-SCNC: 139 MMOL/L (ref 137–145)
WBC # BLD AUTO: 5.9 K/UL (ref 3.8–10.6)

## 2021-10-15 RX ADMIN — LEUCINE, PHENYLALANINE, LYSINE, METHIONINE, ISOLEUCINE, VALINE, HISTIDINE, THREONINE, TRYPTOPHAN, ALANINE, GLYCINE, ARGININE, PROLINE, SERINE, TYROSINE, DEXTROSE ONE MLS/HR: 365; 280; 290; 200; 300; 290; 240; 210; 90; 1035; 515; 575; 340; 250; 20; 15 INJECTION INTRAVENOUS at 11:01

## 2021-10-15 RX ADMIN — POTASSIUM CHLORIDE SCH MLS/HR: 7.46 INJECTION, SOLUTION INTRAVENOUS at 13:56

## 2021-10-15 RX ADMIN — SODIUM BICARBONATE SCH MLS/HR: 84 INJECTION, SOLUTION INTRAVENOUS at 05:10

## 2021-10-15 RX ADMIN — CEFAZOLIN SCH: 330 INJECTION, POWDER, FOR SOLUTION INTRAMUSCULAR; INTRAVENOUS at 19:46

## 2021-10-15 RX ADMIN — NOREPINEPHRINE BITARTRATE SCH: 1 INJECTION, SOLUTION, CONCENTRATE INTRAVENOUS at 22:30

## 2021-10-15 RX ADMIN — INSULIN ASPART SCH: 100 INJECTION, SOLUTION INTRAVENOUS; SUBCUTANEOUS at 06:56

## 2021-10-15 RX ADMIN — INSULIN DETEMIR SCH UNIT: 100 INJECTION, SOLUTION SUBCUTANEOUS at 20:27

## 2021-10-15 RX ADMIN — LEUCINE, PHENYLALANINE, LYSINE, METHIONINE, ISOLEUCINE, VALINE, HISTIDINE, THREONINE, TRYPTOPHAN, ALANINE, GLYCINE, ARGININE, PROLINE, SERINE, TYROSINE, DEXTROSE ONE MLS/HR: 365; 280; 290; 200; 300; 290; 240; 210; 90; 1035; 515; 575; 340; 250; 20; 15 INJECTION INTRAVENOUS at 10:42

## 2021-10-15 RX ADMIN — MAGNESIUM SULFATE IN DEXTROSE SCH MLS/HR: 10 INJECTION, SOLUTION INTRAVENOUS at 13:57

## 2021-10-15 RX ADMIN — INSULIN ASPART SCH: 100 INJECTION, SOLUTION INTRAVENOUS; SUBCUTANEOUS at 19:45

## 2021-10-15 RX ADMIN — POTASSIUM CHLORIDE SCH MLS/HR: 7.46 INJECTION, SOLUTION INTRAVENOUS at 11:00

## 2021-10-15 RX ADMIN — Medication SCH MG: at 06:58

## 2021-10-15 RX ADMIN — Medication SCH: at 19:46

## 2021-10-15 RX ADMIN — CEFAZOLIN SCH MLS/HR: 330 INJECTION, POWDER, FOR SOLUTION INTRAMUSCULAR; INTRAVENOUS at 09:00

## 2021-10-15 RX ADMIN — MAGNESIUM SULFATE IN DEXTROSE SCH MLS/HR: 10 INJECTION, SOLUTION INTRAVENOUS at 11:00

## 2021-10-15 RX ADMIN — INSULIN ASPART SCH: 100 INJECTION, SOLUTION INTRAVENOUS; SUBCUTANEOUS at 19:46

## 2021-10-15 RX ADMIN — POTASSIUM CHLORIDE SCH MLS/HR: 7.46 INJECTION, SOLUTION INTRAVENOUS at 13:55

## 2021-10-15 RX ADMIN — POTASSIUM CHLORIDE SCH MLS/HR: 7.46 INJECTION, SOLUTION INTRAVENOUS at 09:35

## 2021-10-15 RX ADMIN — Medication SCH: at 19:45

## 2021-10-15 RX ADMIN — MAGNESIUM SULFATE IN DEXTROSE SCH: 10 INJECTION, SOLUTION INTRAVENOUS at 19:45

## 2021-10-15 NOTE — P.PN
Subjective





Patient is seen in follow-up for acute kidney injury.  Underwent bilateral 

ureteral stent placement this admission.  Off vasopressors.  Started on h

emodialysis October 14 due to uremia and severe renal failure.  BUN is better 

today.  Patient is still confused.  Nonoliguric.





Vital signs are stable.


General: Lethargic.


HEENT: Head exam is unremarkable. 


LUNGS: Breath sounds decreased.


HEART: Rate and Rhythm are regular. 


ABDOMEN: Soft, no distention.


EXTREMITITES: No edema.





Objective





- Vital Signs


Vital signs: 


                                   Vital Signs











Temp  98.5 F   10/15/21 08:00


 


Pulse  79   10/15/21 10:00


 


Resp  15   10/15/21 10:00


 


BP  128/69   10/15/21 10:00


 


Pulse Ox  97   10/15/21 10:00








                                 Intake & Output











 10/14/21 10/15/21 10/15/21





 18:59 06:59 18:59


 


Intake Total 4208.197 9607.268 375


 


Output Total 1585 1105 325


 


Balance 352.598 342.268 50


 


Weight  106.2 kg 


 


Intake:   


 


  IV 20  250


 


    D5 at 20 20  


 


    Sodium Chloride 0.45% 1,   250





    000 ml @ 125 mls/hr IV .   





    Q8H [facility] Rx#:[id number]   


 


  Intake, IV Titration 9372.273 1945.268 125





  Amount   


 


    Norepinephrine 8 mg In 242.598 72.268 





    Sodium Chloride 0.9% 250   





    ml @ 0.05 MCG/KG/MIN 8.   





    996 mls/hr IV .Q24H [facility]   





    Rx#:[id number]   


 


    Sodium Chloride 0.45% 1, 1625 1375 125





    000 ml @ 125 mls/hr IV .   





    Q8H [facility] Rx#:[id number]   


 


    cefTRIAXone 1 gm In 50  





    Sodium Chloride 0.9% 50   





    ml @ 100 mls/hr IVPB   





    Q24HR [facility] Rx#:[id number]   


 


Output:   


 


  Urine 1585 1105 325


 


Other:   


 


  Voiding Method Indwelling Catheter Indwelling Catheter 


 


  # Bowel Movements 1  














- Labs


CBC & Chem 7: 


                                 10/15/21 06:43





                                 10/15/21 06:43


Labs: 


                  Abnormal Lab Results - Last 24 Hours (Table)











  10/14/21 10/14/21 10/14/21 Range/Units





  04:33 04:33 10:50 


 


RBC     (4.30-5.90)  m/uL


 


Hgb     (13.0-17.5)  gm/dL


 


Hct     (39.0-53.0)  %


 


Plt Count     (150-450)  k/uL


 


Lymphocytes #     (1.0-4.8)  k/uL


 


Potassium     (3.5-5.1)  mmol/L


 


Chloride    93 L  ()  mmol/L


 


Carbon Dioxide    37 H  (22-30)  mmol/L


 


BUN    95 H  (9-20)  mg/dL


 


Creatinine    8.99 H*  (0.66-1.25)  mg/dL


 


Glucose    122 H  (74-99)  mg/dL


 


POC Glucose (mg/dL)     (75-99)  mg/dL


 


Hemoglobin A1c   7.8 H   (4.0-6.0)  %


 


Calcium    6.2 L*  (8.4-10.2)  mg/dL


 


Procalcitonin  6.88 H    (0.02-0.09)  ng/mL














  10/14/21 10/15/21 10/15/21 Range/Units





  16:42 06:43 06:43 


 


RBC   2.70 L   (4.30-5.90)  m/uL


 


Hgb   8.7 L   (13.0-17.5)  gm/dL


 


Hct   26.5 L   (39.0-53.0)  %


 


Plt Count   106 L   (150-450)  k/uL


 


Lymphocytes #   0.5 L   (1.0-4.8)  k/uL


 


Potassium    3.2 L  (3.5-5.1)  mmol/L


 


Chloride    97 L  ()  mmol/L


 


Carbon Dioxide    33 H  (22-30)  mmol/L


 


BUN    68 H  (9-20)  mg/dL


 


Creatinine    6.67 H  (0.66-1.25)  mg/dL


 


Glucose    126 H  (74-99)  mg/dL


 


POC Glucose (mg/dL)  175 H    (75-99)  mg/dL


 


Hemoglobin A1c     (4.0-6.0)  %


 


Calcium    6.0 L*  (8.4-10.2)  mg/dL


 


Procalcitonin     (0.02-0.09)  ng/mL














  10/15/21 Range/Units





  06:55 


 


RBC   (4.30-5.90)  m/uL


 


Hgb   (13.0-17.5)  gm/dL


 


Hct   (39.0-53.0)  %


 


Plt Count   (150-450)  k/uL


 


Lymphocytes #   (1.0-4.8)  k/uL


 


Potassium   (3.5-5.1)  mmol/L


 


Chloride   ()  mmol/L


 


Carbon Dioxide   (22-30)  mmol/L


 


BUN   (9-20)  mg/dL


 


Creatinine   (0.66-1.25)  mg/dL


 


Glucose   (74-99)  mg/dL


 


POC Glucose (mg/dL)  134 H  (75-99)  mg/dL


 


Hemoglobin A1c   (4.0-6.0)  %


 


Calcium   (8.4-10.2)  mg/dL


 


Procalcitonin   (0.02-0.09)  ng/mL








                      Microbiology - Last 24 Hours (Table)











 10/12/21 18:40 Blood Culture - Preliminary





 Blood    No Growth after 48 hours


 


 10/12/21 13:57 Gram Stain - Preliminary





 Aspirate Body Fluid Culture - Preliminary














Assessment and Plan


Plan: 





Assessment:


1.  Acute kidney injury secondary to ATN secondary to obstructive uropathy and 

hypovolemic shock.  Creatinine was 11.6 on admission and is 9.16 yesterday.  

Started on hemodialysis October 14 due to uremia.  Patient's creatinine the last

2 years has been in the range of 1.6-2.


2.  Bilateral hydronephrosis with nephrolithiasis status post ureteral stent 

placement on October 12.


3.  Hyperkalemia secondary to acute kidney injury, metabolic acidosis and 

lisinopril.  Resolved.  Now hypokalemic.


4.  Metabolic acidosis secondary to acute kidney injury and lactic acidosis.  He

was also on metformin outpatient.  Status post bicarb drip.  Resolved.


5.  Diabetes mellitus.


6.  Hypernatremia from lack of oral water intake.  Resolved.


7.  Hypocalcemia secondary to acute kidney injury.


8.  Hyperphosphatemia secondary to acute kidney injury.  On PhosLo.  Improving.





Plan:


Second treatment of hemodialysis today and third treatment tomorrow.


Change half-normal saline to normal saline at 50 mL an hour.


Replace calcium and potassium. 


Check albumin and ionized calcium level.


Continue to monitor renal function and urine output.


Monitor for renal recovery.

## 2021-10-15 NOTE — P.PN
Subjective


Progress Note Date: 10/15/21





Patient is alert and oriented, but appears to have had confusion last night in 

which he believed physicians from the hospital were trying to kidnap him.  Still

has asterixis on exam.  Labs improving s/p first RRT session last night.





Objective





- Vital Signs


Vital signs: 


                                   Vital Signs











Temp  98.5 F   10/15/21 08:00


 


Pulse  78   10/15/21 12:00


 


Resp  11 L  10/15/21 12:00


 


BP  112/65   10/15/21 12:00


 


Pulse Ox  95   10/15/21 12:00








                                 Intake & Output











 10/14/21 10/15/21 10/15/21





 18:59 06:59 18:59


 


Intake Total 7038.166 3520.268 625


 


Output Total 1585 1105 580


 


Balance 352.598 342.268 45


 


Weight  106.2 kg 


 


Intake:   


 


  IV 20  350


 


    D5 at 20 20  


 


    Sodium Chloride 0.45% 1,   250





    000 ml @ 125 mls/hr IV .   





    Q8H [facility] Rx#:[id number]   


 


    Sodium Chloride 0.9% 1,   100





    000 ml @ 50 mls/hr IV .   





    Q20H [facility] Rx#:[id number]   


 


  Intake, IV Titration 2479.401 0356.268 275





  Amount   


 


    Norepinephrine 8 mg In 242.598 72.268 





    Sodium Chloride 0.9% 250   





    ml @ 0.05 MCG/KG/MIN 8.   





    996 mls/hr IV .Q24H [facility]   





    Rx#:[id number]   


 


    Potassium Chloride 10 meq   100





    In Water For Injection 1   





    100ml.bag @ 100 mls/hr   





    IVPB Q1HR [facility] Rx#:   





    [id number]   


 


    Sodium Chloride 0.45% 1, 1625 1375 125





    000 ml @ 125 mls/hr IV .   





    Q8H [facility] Rx#:[id number]   


 


    cefTRIAXone 1 gm In 50  50





    Sodium Chloride 0.9% 50   





    ml @ 100 mls/hr IVPB   





    Q24HR [facility] Rx#:[id number]   


 


Output:   


 


  Urine 1585 1105 580


 


Other:   


 


  Voiding Method Indwelling Catheter Indwelling Catheter Indwelling Catheter


 


  # Bowel Movements 1  














- Exam





Gen: awake, alert and oriented 2


HEENT: normocephalic, atraumatic, good hearing acuity, moist mucous membranes


Resp: good air exchange, breathing comfortably with no accessory muscle use


CVS: good distal perfusion x 4,


GI: soft, NTTP, ND


: no SPT, bilateral CVAT, herndon catheter is present, grossly hemorrhagic


MSK: no pitting edema, no clubbing


Neuro: non-focal, moving all extremities


Psych: cooperative, euthymic mood  





- Labs


CBC & Chem 7: 


                                 10/15/21 06:43





                                 10/15/21 06:43


Labs: 


                  Abnormal Lab Results - Last 24 Hours (Table)











  10/14/21 10/14/21 10/14/21 Range/Units





  04:33 04:33 16:42 


 


RBC     (4.30-5.90)  m/uL


 


Hgb     (13.0-17.5)  gm/dL


 


Hct     (39.0-53.0)  %


 


Plt Count     (150-450)  k/uL


 


Lymphocytes #     (1.0-4.8)  k/uL


 


Potassium     (3.5-5.1)  mmol/L


 


Chloride     ()  mmol/L


 


Carbon Dioxide     (22-30)  mmol/L


 


BUN     (9-20)  mg/dL


 


Creatinine     (0.66-1.25)  mg/dL


 


Glucose     (74-99)  mg/dL


 


POC Glucose (mg/dL)    175 H  (75-99)  mg/dL


 


Hemoglobin A1c   7.8 H   (4.0-6.0)  %


 


Calcium     (8.4-10.2)  mg/dL


 


Ionized Calcium Lacie     (4.5-5.3)  mg/dL


 


Magnesium     (1.6-2.3)  mg/dL


 


Albumin     (3.5-5.0)  g/dL


 


Procalcitonin  6.88 H    (0.02-0.09)  ng/mL














  10/15/21 10/15/21 10/15/21 Range/Units





  06:43 06:43 06:43 


 


RBC  2.70 L    (4.30-5.90)  m/uL


 


Hgb  8.7 L    (13.0-17.5)  gm/dL


 


Hct  26.5 L    (39.0-53.0)  %


 


Plt Count  106 L    (150-450)  k/uL


 


Lymphocytes #  0.5 L    (1.0-4.8)  k/uL


 


Potassium   3.2 L   (3.5-5.1)  mmol/L


 


Chloride   97 L   ()  mmol/L


 


Carbon Dioxide   33 H   (22-30)  mmol/L


 


BUN   68 H   (9-20)  mg/dL


 


Creatinine   6.67 H   (0.66-1.25)  mg/dL


 


Glucose   126 H   (74-99)  mg/dL


 


POC Glucose (mg/dL)     (75-99)  mg/dL


 


Hemoglobin A1c     (4.0-6.0)  %


 


Calcium   6.0 L*   (8.4-10.2)  mg/dL


 


Ionized Calcium Lacie     (4.5-5.3)  mg/dL


 


Magnesium    1.5 L  (1.6-2.3)  mg/dL


 


Albumin     (3.5-5.0)  g/dL


 


Procalcitonin     (0.02-0.09)  ng/mL














  10/15/21 10/15/21 Range/Units





  06:43 06:55 


 


RBC    (4.30-5.90)  m/uL


 


Hgb    (13.0-17.5)  gm/dL


 


Hct    (39.0-53.0)  %


 


Plt Count    (150-450)  k/uL


 


Lymphocytes #    (1.0-4.8)  k/uL


 


Potassium    (3.5-5.1)  mmol/L


 


Chloride    ()  mmol/L


 


Carbon Dioxide    (22-30)  mmol/L


 


BUN    (9-20)  mg/dL


 


Creatinine    (0.66-1.25)  mg/dL


 


Glucose    (74-99)  mg/dL


 


POC Glucose (mg/dL)   134 H  (75-99)  mg/dL


 


Hemoglobin A1c    (4.0-6.0)  %


 


Calcium    (8.4-10.2)  mg/dL


 


Ionized Calcium Lacie  3.3 L*   (4.5-5.3)  mg/dL


 


Magnesium    (1.6-2.3)  mg/dL


 


Albumin  2.5 L   (3.5-5.0)  g/dL


 


Procalcitonin    (0.02-0.09)  ng/mL








                      Microbiology - Last 24 Hours (Table)











 10/12/21 13:57 Gram Stain - Preliminary





 Aspirate Body Fluid Culture - Preliminary


 


 10/12/21 18:40 Blood Culture - Preliminary





 Blood    No Growth after 48 hours














Assessment and Plan


Assessment: 





Bilateral hydronephrosis


Obstructive nephrolithiasis


Post renal Acute kidney injury


-Admit inpatient, ICU with Pulm consult


-Nephrology consult; hemodialysis 10/14, 10/15


-Urology consult stat for nephroureteral stent completed on 10/12


-Pain control: Dilaudid when necessary 


-Stool softener


-Nausea control


-IV fluids, hourly urine output


-Bicarb drip discontinued;


-Started on calcium acetate, Phos improving


-replete Ca, Mg, K as necessary








Diabetes


Hypertension


Hyperlipidemia


-Home medications reviewed and reconciled 





Patient is full code


DVT prophylaxis with heparin 3 times a day

## 2021-10-15 NOTE — P.PN
Subjective


Progress Note Date: 10/15/21





Pulmonary/critical care consultation dated 10/12/2021.





71-year-old male, who presents to the emergency department at 0652 in the 

morning on October 12.  He was brought in by his wife.  This patient for the 

last week to 10 days has not been feeling well.  He's had significant weakness, 

fatigue, nausea, and vomiting.  According to his wife, the patient became very 

weak, and really could not balance himself or walk properly.  For that reason, 

she brought in to be evaluated.  In addition, the patient has not urinated in 

the last 24 hours.  He had some mental status changes and obviously was very 

concerned.  We were asked to see the patient for possible admission to the 

intensive care unit.  The patient was hypotensive.  In addition, the patient had

developed acute kidney injury/acute renal failure, and was found on CAT scan to 

have kidney stones.  He may need a stent placement according to the ER 

physician.  His medical history includes asthma, diabetes, hyperlipidemia, 

hypertension, kidney stones, umbilical hernia, and recent coronavirus infection.

 The patient is a lifelong nonsmoker.  There is a family history of deep venous 

thrombosis.  White count was 15.8, hemoglobin 11.5, hematocrit 37.2, platelet 

count 219,000.  Sodium 150, potassium 5.1, chlorides 101, CO2 less than 5, anion

gap was 38, BUN 72, and creatinine 11.21.  Lactic acid was 14.6.  CT of the 

abdomen and pelvis showed new mild right-sided hydronephrosis and right 

perinephritic fat stranding with interval passage of a small lower pole right 

renal calculi into the bladder.  There is persistent moderate left-sided hydro

nephrosis due to obstructing distal ureter calculi.  It should also be noted 

that the patient was here about a week ago, hydrated in the emergency 

department, and discharged home.  The patient is to go to the operating room for

bilateral stent placement.





Progress note dated 10/13/2021.





71-year-old white male seen yesterday in the emergency department.  The patient 

went to the operating room yesterday, had bilateral ureteral stents.  He was 

successfully extubated on October 12.  Today's postop day #1.  Currently, he's 

on 3 L nasal cannula, IV Rocephin, and an IV of D5W with 3 ampules of sodium 

bicarbonate at 1 50 mL an hour.  The patient is getting saline at 20 mL now 

which I told the nurse to discontinue.  In addition, the patient is on 

norepinephrine at 9 mcg/m.  Clinically he appears to be a bit sleepy but does 

arouse and is appropriate.  White count is 19.6, hemoglobin 9.7, hematocrit 

30.4, and platelet count 215,000 sodium is 148, potassium 5.4, chlorides 97, CO2

11, anion gap is 4, BUN is 81, creatinine is 10.25.  Plasma lactic acid is 13.8.

 Magnesium 1.6.








The patient is seen today 10/14/2021 in follow-up in the intensive care unit.  

He is currently sitting up in bed.  Arouses to verbal stimuli but drowsy. 

Somewhat slow to respond. He is maintaining O2 saturations in the 90s on 2 L/m 

per nasal cannula.  He has 0.45% normal saline running at 125 ML's per hour.  He

is still requiring norepinephrine at 8.4 mcg/m.  He is on antibiotics in the 

form of ceftriaxone.  Urine culture reveals no growth.  Blood cultures reveal no

growth to date.  White count 9.2.  Hemoglobin 10.0.  Platelets 180.  Sodium 146.

 Potassium 3.8.  Bicarb 34.  Creatinine 9.16.  He is making urine at about 100 

ML's per hour.  The plan is for possible renal replacement therapy today.








The patient is seen today 10/15/2021 and follow-up in the intensive care unit.  

He is much more awake and alert today.  Sitting up in bed.  Maintaining O2 

saturations in the 90s on 2 L/m per nasal cannula.  Hemodynamically stable and 

off pressors currently.  He has 0.45 normal saline at 125 an hour.  He did r

eceive hemodialysis yesterday.  Dialysis catheter was placed in the right 

femoral vein. Today's creatinine is down to 6.67.  BUN 68.  Potassium 3.2.  

White count 5.9.  Hemoglobin 8.7.  Platelet count 106.  Sodium 139.  Bicarb 33. 

Glucose 126.  Calcium 6.0.  He remains on antibiotics in the form of 

ceftriaxone.  He is on calcium replacement tablets. 





Objective





- Vital Signs


Vital signs: 


                                   Vital Signs











Temp  98.5 F   10/15/21 04:00


 


Pulse  78   10/15/21 07:00


 


Resp  16   10/15/21 07:00


 


BP  109/59   10/15/21 07:00


 


Pulse Ox  96   10/15/21 07:00








                                 Intake & Output











 10/14/21 10/15/21 10/15/21





 18:59 06:59 18:59


 


Intake Total 2381.848 6956.268 125


 


Output Total 1585 1105 150


 


Balance 352.598 342.268 -25


 


Weight  106.2 kg 


 


Intake:   


 


  IV 20  


 


    D5 at 20 20  


 


  Intake, IV Titration 1236.009 0251.268 125





  Amount   


 


    Norepinephrine 8 mg In 242.598 72.268 





    Sodium Chloride 0.9% 250   





    ml @ 0.05 MCG/KG/MIN 8.   





    996 mls/hr IV .Q24H [facility]   





    Rx#:[id number]   


 


    Sodium Chloride 0.45% 1, 1625 1375 125





    000 ml @ 125 mls/hr IV .   





    Q8H [facility] Rx#:[id number]   


 


    cefTRIAXone 1 gm In 50  





    Sodium Chloride 0.9% 50   





    ml @ 100 mls/hr IVPB   





    Q24HR [facility] Rx#:[id number]   


 


Output:   


 


  Urine 1585 1105 150


 


Other:   


 


  Voiding Method Indwelling Catheter Indwelling Catheter 


 


  # Bowel Movements 1  














- Exam





GENERAL EXAM: Awake, more alert 71-year-old male patient, lethargic, on 2 L 

nasal cannula, fairly comfortable in no apparent distress.


HEAD: Normocephalic.


EYES: Normal reaction of pupils, equal size.


NOSE: Clear with pink turbinates.


THROAT: No erythema or exudates.


NECK: No masses, no JVD.


CHEST: No chest wall deformity.


LUNGS: Equal air entry with faint crackles in the posterior bases.


CVS: S1 and S2 normal with no audible murmur, regular rhythm.


ABDOMEN: No hepatosplenomegaly, normal bowel sounds, no guarding or rigidity.


SPINE: No scoliosis or deformity


SKIN: No rashes


CENTRAL NERVOUS SYSTEM: No focal deficits, tone is normal in all 4 extremities.


EXTREMITIES: There is no peripheral edema.  No clubbing, no cyanosis.  

Peripheral pulses are intact.





- Labs


CBC & Chem 7: 


                                 10/15/21 06:43





                                 10/15/21 06:43


Labs: 


                  Abnormal Lab Results - Last 24 Hours (Table)











  10/14/21 10/14/21 10/14/21 Range/Units





  04:33 04:33 08:30 


 


RBC     (4.30-5.90)  m/uL


 


Hgb     (13.0-17.5)  gm/dL


 


Hct     (39.0-53.0)  %


 


Plt Count     (150-450)  k/uL


 


Lymphocytes #     (1.0-4.8)  k/uL


 


Potassium     (3.5-5.1)  mmol/L


 


Chloride     ()  mmol/L


 


Carbon Dioxide     (22-30)  mmol/L


 


BUN     (9-20)  mg/dL


 


Creatinine     (0.66-1.25)  mg/dL


 


Glucose     (74-99)  mg/dL


 


POC Glucose (mg/dL)    115 H  (75-99)  mg/dL


 


Hemoglobin A1c   7.8 H   (4.0-6.0)  %


 


Calcium     (8.4-10.2)  mg/dL


 


Procalcitonin  6.88 H    (0.02-0.09)  ng/mL














  10/14/21 10/14/21 10/15/21 Range/Units





  10:50 16:42 06:43 


 


RBC    2.70 L  (4.30-5.90)  m/uL


 


Hgb    8.7 L  (13.0-17.5)  gm/dL


 


Hct    26.5 L  (39.0-53.0)  %


 


Plt Count    106 L  (150-450)  k/uL


 


Lymphocytes #    0.5 L  (1.0-4.8)  k/uL


 


Potassium     (3.5-5.1)  mmol/L


 


Chloride  93 L    ()  mmol/L


 


Carbon Dioxide  37 H    (22-30)  mmol/L


 


BUN  95 H    (9-20)  mg/dL


 


Creatinine  8.99 H*    (0.66-1.25)  mg/dL


 


Glucose  122 H    (74-99)  mg/dL


 


POC Glucose (mg/dL)   175 H   (75-99)  mg/dL


 


Hemoglobin A1c     (4.0-6.0)  %


 


Calcium  6.2 L*    (8.4-10.2)  mg/dL


 


Procalcitonin     (0.02-0.09)  ng/mL














  10/15/21 10/15/21 Range/Units





  06:43 06:55 


 


RBC    (4.30-5.90)  m/uL


 


Hgb    (13.0-17.5)  gm/dL


 


Hct    (39.0-53.0)  %


 


Plt Count    (150-450)  k/uL


 


Lymphocytes #    (1.0-4.8)  k/uL


 


Potassium  3.2 L   (3.5-5.1)  mmol/L


 


Chloride  97 L   ()  mmol/L


 


Carbon Dioxide  33 H   (22-30)  mmol/L


 


BUN  68 H   (9-20)  mg/dL


 


Creatinine  6.67 H   (0.66-1.25)  mg/dL


 


Glucose  126 H   (74-99)  mg/dL


 


POC Glucose (mg/dL)   134 H  (75-99)  mg/dL


 


Hemoglobin A1c    (4.0-6.0)  %


 


Calcium  6.0 L*   (8.4-10.2)  mg/dL


 


Procalcitonin    (0.02-0.09)  ng/mL








                      Microbiology - Last 24 Hours (Table)











 10/12/21 18:40 Blood Culture - Preliminary





 Blood    No Growth after 48 hours


 


 10/12/21 13:57 Gram Stain - Preliminary





 Aspirate Body Fluid Culture - Preliminary














Assessment and Plan


Assessment: 





1 Acute kidney injury, secondary to bilateral renal calculi, and bilateral 

hydronephrosis.  No significant improvement, renal replacement therapy initiated

10/14/2021





2 Routine postoperative ventilator management, with extubation successfully on 

10/12/2021.  Currently on 2 L nasal cannula





3 Postop day #3, status post bilateral ureteral stents.





4 Severe anion gap metabolic acidosis, secondary to acute kidney injury/acute 

renal failure.





5 Hypotension secondary to sepsis, improved, currently off norepinephrine





6 Acute hyperkalemia improved.





7 Metabolic/septic encephalopathy.





8 Anuria/oliguria.





9 History of diabetes mellitus.





10 History of hyperlipidemia.





11 History of hypertension.





12 Prior history of kidney stones.





13 History of asthma.





Plan:





The patient was seen and evaluated by Dr. Pavon


More awake and alert today


Plan is for renal replacement therapy again today


Status post bilateral ureteral stents


Continued on ceftriaxone


Follow-up chest x-ray and labs in a.m.


We will continue to follow and make further recommendations based on his 

clinical status





I, the cosigning physician, performed a history & physical examination of the 

patient. Lungs sounds faint crackles in the posterior bases.  Maintaining good 

O2 saturations in the 90s on 2 L/m per nasal cannula  I discussed the assessment

and plan of care with my nurse practitioner, Vera Atkins. I attest to the above 

note as dictated by her.

## 2021-10-16 LAB
ALBUMIN SERPL-MCNC: 2.7 G/DL (ref 3.5–5)
ALP SERPL-CCNC: 58 U/L (ref 38–126)
ALT SERPL-CCNC: 16 U/L (ref 4–49)
ANION GAP SERPL CALC-SCNC: 7 MMOL/L
AST SERPL-CCNC: 63 U/L (ref 17–59)
BASOPHILS # BLD AUTO: 0 K/UL (ref 0–0.2)
BASOPHILS NFR BLD AUTO: 0 %
BUN SERPL-SCNC: 31 MG/DL (ref 9–20)
CALCIUM SPEC-MCNC: 7.4 MG/DL (ref 8.4–10.2)
CHLORIDE SERPL-SCNC: 109 MMOL/L (ref 98–107)
CO2 SERPL-SCNC: 26 MMOL/L (ref 22–30)
EOSINOPHIL # BLD AUTO: 0.3 K/UL (ref 0–0.7)
EOSINOPHIL NFR BLD AUTO: 5 %
ERYTHROCYTE [DISTWIDTH] IN BLOOD BY AUTOMATED COUNT: 2.68 M/UL (ref 4.3–5.9)
ERYTHROCYTE [DISTWIDTH] IN BLOOD: 11.9 % (ref 11.5–15.5)
GLUCOSE BLD-MCNC: 130 MG/DL (ref 75–99)
GLUCOSE BLD-MCNC: 159 MG/DL (ref 75–99)
GLUCOSE BLD-MCNC: 191 MG/DL (ref 75–99)
GLUCOSE BLD-MCNC: 98 MG/DL (ref 75–99)
GLUCOSE SERPL-MCNC: 122 MG/DL (ref 74–99)
HCT VFR BLD AUTO: 26.6 % (ref 39–53)
HGB BLD-MCNC: 8.6 GM/DL (ref 13–17.5)
LYMPHOCYTES # SPEC AUTO: 0.5 K/UL (ref 1–4.8)
LYMPHOCYTES NFR SPEC AUTO: 11 %
MCH RBC QN AUTO: 32.2 PG (ref 25–35)
MCHC RBC AUTO-ENTMCNC: 32.4 G/DL (ref 31–37)
MCV RBC AUTO: 99.2 FL (ref 80–100)
MONOCYTES # BLD AUTO: 0.3 K/UL (ref 0–1)
MONOCYTES NFR BLD AUTO: 7 %
NEUTROPHILS # BLD AUTO: 3.8 K/UL (ref 1.3–7.7)
NEUTROPHILS NFR BLD AUTO: 76 %
PLATELET # BLD AUTO: 110 K/UL (ref 150–450)
POTASSIUM SERPL-SCNC: 3.7 MMOL/L (ref 3.5–5.1)
PROT SERPL-MCNC: 4.8 G/DL (ref 6.3–8.2)
SODIUM SERPL-SCNC: 142 MMOL/L (ref 137–145)
WBC # BLD AUTO: 5 K/UL (ref 3.8–10.6)

## 2021-10-16 RX ADMIN — INSULIN ASPART SCH: 100 INJECTION, SOLUTION INTRAVENOUS; SUBCUTANEOUS at 14:41

## 2021-10-16 RX ADMIN — Medication SCH MG: at 12:32

## 2021-10-16 RX ADMIN — INSULIN ASPART SCH UNIT: 100 INJECTION, SOLUTION INTRAVENOUS; SUBCUTANEOUS at 06:34

## 2021-10-16 RX ADMIN — INSULIN ASPART SCH: 100 INJECTION, SOLUTION INTRAVENOUS; SUBCUTANEOUS at 17:32

## 2021-10-16 RX ADMIN — Medication SCH MG: at 17:41

## 2021-10-16 RX ADMIN — CEFAZOLIN SCH MLS/HR: 330 INJECTION, POWDER, FOR SOLUTION INTRAMUSCULAR; INTRAVENOUS at 06:35

## 2021-10-16 RX ADMIN — INSULIN DETEMIR SCH UNIT: 100 INJECTION, SOLUTION SUBCUTANEOUS at 21:04

## 2021-10-16 RX ADMIN — Medication SCH MG: at 06:34

## 2021-10-16 NOTE — P.PN
Subjective


Progress Note Date: 10/16/21





Pulmonary/critical care consultation dated 10/12/2021.





71-year-old male, who presents to the emergency department at 0652 in the 

morning on October 12.  He was brought in by his wife.  This patient for the 

last week to 10 days has not been feeling well.  He's had significant weakness, 

fatigue, nausea, and vomiting.  According to his wife, the patient became very 

weak, and really could not balance himself or walk properly.  For that reason, 

she brought in to be evaluated.  In addition, the patient has not urinated in 

the last 24 hours.  He had some mental status changes and obviously was very 

concerned.  We were asked to see the patient for possible admission to the 

intensive care unit.  The patient was hypotensive.  In addition, the patient had

developed acute kidney injury/acute renal failure, and was found on CAT scan to 

have kidney stones.  He may need a stent placement according to the ER 

physician.  His medical history includes asthma, diabetes, hyperlipidemia, 

hypertension, kidney stones, umbilical hernia, and recent coronavirus infection.

 The patient is a lifelong nonsmoker.  There is a family history of deep venous 

thrombosis.  White count was 15.8, hemoglobin 11.5, hematocrit 37.2, platelet 

count 219,000.  Sodium 150, potassium 5.1, chlorides 101, CO2 less than 5, anion

gap was 38, BUN 72, and creatinine 11.21.  Lactic acid was 14.6.  CT of the 

abdomen and pelvis showed new mild right-sided hydronephrosis and right 

perinephritic fat stranding with interval passage of a small lower pole right 

renal calculi into the bladder.  There is persistent moderate left-sided hydro

nephrosis due to obstructing distal ureter calculi.  It should also be noted 

that the patient was here about a week ago, hydrated in the emergency 

department, and discharged home.  The patient is to go to the operating room for

bilateral stent placement.





Progress note dated 10/13/2021.





71-year-old white male seen yesterday in the emergency department.  The patient 

went to the operating room yesterday, had bilateral ureteral stents.  He was 

successfully extubated on October 12.  Today's postop day #1.  Currently, he's 

on 3 L nasal cannula, IV Rocephin, and an IV of D5W with 3 ampules of sodium 

bicarbonate at 1 50 mL an hour.  The patient is getting saline at 20 mL now 

which I told the nurse to discontinue.  In addition, the patient is on 

norepinephrine at 9 mcg/m.  Clinically he appears to be a bit sleepy but does 

arouse and is appropriate.  White count is 19.6, hemoglobin 9.7, hematocrit 

30.4, and platelet count 215,000 sodium is 148, potassium 5.4, chlorides 97, CO2

11, anion gap is 4, BUN is 81, creatinine is 10.25.  Plasma lactic acid is 13.8.

 Magnesium 1.6.








The patient is seen today 10/14/2021 in follow-up in the intensive care unit.  

He is currently sitting up in bed.  Arouses to verbal stimuli but drowsy. 

Somewhat slow to respond. He is maintaining O2 saturations in the 90s on 2 L/m 

per nasal cannula.  He has 0.45% normal saline running at 125 ML's per hour.  He

is still requiring norepinephrine at 8.4 mcg/m.  He is on antibiotics in the 

form of ceftriaxone.  Urine culture reveals no growth.  Blood cultures reveal no

growth to date.  White count 9.2.  Hemoglobin 10.0.  Platelets 180.  Sodium 146.

 Potassium 3.8.  Bicarb 34.  Creatinine 9.16.  He is making urine at about 100 

ML's per hour.  The plan is for possible renal replacement therapy today.








The patient is seen today 10/15/2021 and follow-up in the intensive care unit.  

He is much more awake and alert today.  Sitting up in bed.  Maintaining O2 

saturations in the 90s on 2 L/m per nasal cannula.  Hemodynamically stable and 

off pressors currently.  He has 0.45 normal saline at 125 an hour.  He did r

eceive hemodialysis yesterday.  Dialysis catheter was placed in the right 

femoral vein. Today's creatinine is down to 6.67.  BUN 68.  Potassium 3.2.  

White count 5.9.  Hemoglobin 8.7.  Platelet count 106.  Sodium 139.  Bicarb 33. 

Glucose 126.  Calcium 6.0.  He remains on antibiotics in the form of 

ceftriaxone.  He is on calcium replacement tablets. 





the patient is seen today 10/16/2021 in follow-up in the intensive care unit.  

He is sitting up in bed.  Awake and alert in no acute distress.  Feeling nearly 

back to his baseline mentally.  He is maintaining good O2 saturations in the 90s

on 2 L/m per nasal cannula.  His 0.9 normal saline at 50 mL per hour. Chest x-

ray shows interval development of a retrocardiac opacity/infiltrate and effusion

of the left lung.  Right lung is clear. He did receive hemodialysis again 

yesterday. The plan is for possible lhemodialysis again tomorrow. white count 

5.0.  Hemoglobin 8.6.  Platelets 110.  Glucose 98. He remains on ceftriaxone.





Objective





- Vital Signs


Vital signs: 


                                   Vital Signs











Temp  98.5 F   10/16/21 08:00


 


Pulse  66   10/16/21 12:00


 


Resp  16   10/16/21 12:00


 


BP  125/66   10/16/21 12:00


 


Pulse Ox  98   10/16/21 12:00








                                 Intake & Output











 10/15/21 10/16/21 10/16/21





 18:59 06:59 18:59


 


Intake Total 1995 550 420


 


Output Total 2130 785 605


 


Balance -135 -235 -185


 


Weight  103.8 kg 


 


Intake:   


 


   550 300


 


    Sodium Chloride 0.45% 1, 250  





    000 ml @ 125 mls/hr IV .   





    Q8H [facility] Rx#:[id number]   


 


    Sodium Chloride 0.9% 1, 350 550 300





    000 ml @ 50 mls/hr IV .   





    Q20H [facility] Rx#:[id number]   


 


  Intake, IV Titration 975  





  Amount   


 


    Calcium Gluconate 2 gm In 100  





    Sodium Chloride 0.9% 100   





    ml @ 100 mls/hr IVPB   





    ONCE ONE Rx#:[id number]   


 


    Magnesium Sulfate-D5w Pmx 300  





    1 gm In Dextrose/Water 1   





    100ml.bag @ 100 mls/hr   





    IVPB Q1H [facility] Rx#:   





    [id number]   


 


    Potassium Chloride 10 meq 400  





    In Water For Injection 1   





    100ml.bag @ 100 mls/hr   





    IVPB Q1HR [facility] Rx#:   





    [id number]   


 


    Sodium Chloride 0.45% 1, 125  





    000 ml @ 125 mls/hr IV .   





    Q8H [facility] Rx#:[id number]   


 


    cefTRIAXone 1 gm In 50  





    Sodium Chloride 0.9% 50   





    ml @ 100 mls/hr IVPB   





    Q24HR [facility] Rx#:[id number]   


 


  Oral 120  120


 


  Hemodialysis 300  


 


Output:   


 


  Urine 1330 785 605


 


  Hemodialysis 800  


 


Other:   


 


  Voiding Method Indwelling Catheter Indwelling Catheter Indwelling Catheter


 


  # Bowel Movements 1  














- Exam





GENERAL EXAM: Awake, more alert 71-year-old male patient, on 2 L nasal cannula, 

fairly comfortable in no apparent distress.


HEAD: Normocephalic.


EYES: Normal reaction of pupils, equal size.


NOSE: Clear with pink turbinates.


THROAT: No erythema or exudates.


NECK: No masses, no JVD.


CHEST: No chest wall deformity.


LUNGS: Equal air entry with faint crackles in the left base.


CVS: S1 and S2 normal with no audible murmur, regular rhythm.


ABDOMEN: No hepatosplenomegaly, normal bowel sounds, no guarding or rigidity.


SPINE: No scoliosis or deformity


SKIN: No rashes


CENTRAL NERVOUS SYSTEM: No focal deficits, tone is normal in all 4 extremities.


EXTREMITIES: There is no peripheral edema.  No clubbing, no cyanosis.  

Peripheral pulses are intact.





- Labs


CBC & Chem 7: 


                                 10/16/21 10:50





                                 10/15/21 06:43


Labs: 


                  Abnormal Lab Results - Last 24 Hours (Table)











  10/14/21 10/15/21 10/16/21 Range/Units





  10:50 19:53 06:29 


 


RBC     (4.30-5.90)  m/uL


 


Hgb     (13.0-17.5)  gm/dL


 


Hct     (39.0-53.0)  %


 


Plt Count     (150-450)  k/uL


 


Lymphocytes #     (1.0-4.8)  k/uL


 


POC Glucose (mg/dL)   176 H  159 H  (75-99)  mg/dL


 


Hep Bs Antibody  NonReactive A    (Nonreactive)  














  10/16/21 Range/Units





  10:50 


 


RBC  2.68 L  (4.30-5.90)  m/uL


 


Hgb  8.6 L  (13.0-17.5)  gm/dL


 


Hct  26.6 L  (39.0-53.0)  %


 


Plt Count  110 L  (150-450)  k/uL


 


Lymphocytes #  0.5 L  (1.0-4.8)  k/uL


 


POC Glucose (mg/dL)   (75-99)  mg/dL


 


Hep Bs Antibody   (Nonreactive)  








                      Microbiology - Last 24 Hours (Table)











 10/12/21 13:57 Gram Stain - Preliminary





 Aspirate Body Fluid Culture - Preliminary


 


 10/12/21 18:40 Blood Culture - Preliminary





 Blood    No Growth after 72 hours


 


 10/12/21 13:57 Anaerobic Culture - Preliminary





 Aspirate 














Assessment and Plan


Assessment: 





1 Acute kidney injury, secondary to bilateral renal calculi, and bilateral 

hydronephrosis.  No significant improvement, renal replacement therapy initiated

10/14/2021





2 Routine postoperative ventilator management, with extubation successfully on 

10/12/2021.  Currently on 2 L nasal cannula





3 Postop day #4, status post bilateral ureteral stents.





4 Severe anion gap metabolic acidosis, secondary to acute kidney injury/acute 

renal failure.





5 Hypotension secondary to sepsis, improved, currently off norepinephrine





6 Acute hyperkalemia improved.





7 Metabolic/septic encephalopathy.





8 Anuria/oliguria.





9 History of diabetes mellitus.





10 History of hyperlipidemia.





11 History of hypertension.





12 Prior history of kidney stones.





13 History of asthma.





Plan:





The patient was seen and evaluated by Dr. Pavon


More awake and alert today


Status post bilateral ureteral stents


Continued on ceftriaxone


Follow-up chest x-ray and labs in a.m.


We will continue to follow and make further recommendations based on his 

clinical status





I, the cosigning physician, performed a history & physical examination of the 

patient. Lungs sounds faint crackles in the left base.  Maintaining good O2 

saturations in the 90s on 2 L/m per nasal cannula  I discussed the assessment 

and plan of care with my nurse practitioner, Vera Atkins. I attest to the above 

note as dictated by her.

## 2021-10-16 NOTE — PN
PROGRESS NOTE



Patient is seen for followup for acute kidney injury.  He is seen on dialysis.  Patient

is tolerating his treatment well.  Today is his second treatment.  He has been started

on dialysis secondary to severe renal failure with creatinine of 11.6 and uremia.

Patient currently has fair urine output, with urine output noted at 75 to 100 mL/hour.

He is status post bilateral ureteral stent placement by Urology on 10/12/2021 for

bilateral hydronephrosis and ureteral calculi.



On examination today, blood pressure is 132/65, heart rate 70 per minute. He is

afebrile.

EXAMINATION OF THE HEART: S1 and S2.

EXAMINATION OF LUNGS: Decreased breath sounds at the bases.

Abdomen is soft, non-tender.

Examination of lower extremities shows trace edema bilaterally.

CNS EXAM: Grossly intact.





Labs show sodium 139, potassium 3.2, chloride 97, BUN 68, creatinine 6.67, hemoglobin

8.7 g/dL, calcium 6, magnesium 3.3.



ASSESSMENT:

1. Acute kidney injury, acute tubular necrosis, and obstructive uropathy, status post

    bilateral ureteral stents and started on hemodialysis, tolerating treatment well.

    Patient is getting his third treatment today.  He was started on dialysis on

    October 14.  We will hold dialysis tomorrow and monitor renal function and urine

    output.  Serum creatinine previously has been 1.6 to 2 mg/dL.

2. Chronic kidney disease.  Previous creatinine 1.6 to 2 and stage 3B secondary to

    nephrosclerosis, nephrolithiasis, possible underlying diabetic kidney disease.

3. Hyperkalemia associated with acute kidney injury, metabolic acidosis, on ACE

    inhibitors, now improved.  Potassium now staying on the lower side.

4. Metabolic acidosis associated with renal failure, lactic acidosis, and possibly

    related to metformin as well, currently improved.

5. Hypocalcemia, status post replacement.

6. Hypernatremia associated with free water deficit.

7. Hyperphosphatemia associated with renal failure, maintained on PhosLo.



PLAN:

Continue saline at 50 mL/hour. Repeat labs in a.m.  Replace potassium and calcium.

Hold dialysis tomorrow and repeat labs in a.m.





MARCIE / ERIC: [id number] / Job#: [id number]

## 2021-10-16 NOTE — XR
EXAMINATION TYPE: XR chest 1V portable

 

DATE OF EXAM: 10/16/2021

 

COMPARISON: 10/12/21

 

HISTORY: weakness

 

TECHNIQUE: Single frontal view of the chest is obtained.

 

FINDINGS:  Interval development of retrocardiac opacity c/w infiltrate and effusion.

 Rt lung is clear. No pneumothorax. Osseous structures intact.

 

Impression: new l; eft lower  effusion and  infiltrate

## 2021-10-16 NOTE — P.PN
Subjective


Progress Note Date: 10/16/21





Patient is doing well today, mentation is significantly improved.  Labs today 

are pending.  Patient remains quite weak and requires 2 person max assist for 

bed transfers.





Objective





- Vital Signs


Vital signs: 


                                   Vital Signs











Temp  98.4 F   10/16/21 13:25


 


Pulse  67   10/16/21 13:25


 


Resp  20   10/16/21 13:25


 


BP  146/83   10/16/21 13:25


 


Pulse Ox  98   10/16/21 13:00








                                 Intake & Output











 10/15/21 10/16/21 10/16/21





 18:59 06:59 18:59


 


Intake Total 1995 550 770


 


Output Total 2130 785 1965


 


Balance -135 235 -1195


 


Weight  103.8 kg 


 


Intake:   


 


   550 350


 


    Sodium Chloride 0.45% 1, 250  





    000 ml @ 125 mls/hr IV .   





    Q8H [facility] Rx#:[id number]   


 


    Sodium Chloride 0.9% 1, 350 550 350





    000 ml @ 50 mls/hr IV .   





    Q20H [facility] Rx#:[id number]   


 


  Intake, IV Titration 975  





  Amount   


 


    Calcium Gluconate 2 gm In 100  





    Sodium Chloride 0.9% 100   





    ml @ 100 mls/hr IVPB   





    [facility] Rx#:[id number]   


 


    Magnesium Sulfate-D5w Pmx 300  





    1 gm In Dextrose/Water 1   





    100ml.bag @ 100 mls/hr   





    IVPB Q1H [facility] Rx#:   





    [id number]   


 


    Potassium Chloride 10 meq 400  





    In Water For Injection 1   





    100ml.bag @ 100 mls/hr   





    IVPB Q1HR [facility] Rx#:   





    [id number]   


 


    Sodium Chloride 0.45% 1, 125  





    000 ml @ 125 mls/hr IV .   





    Q8H [facility] Rx#:[id number]   


 


    cefTRIAXone 1 gm In 50  





    Sodium Chloride 0.9% 50   





    ml @ 100 mls/hr IVPB   





    Q24HR [facility] Rx#:[id number]   


 


  Oral 120  120


 


  Hemodialysis 300  300


 


Output:   


 


  Urine 1330 785 665


 


  Hemodialysis 800  1300


 


Other:   


 


  Voiding Method Indwelling Catheter Indwelling Catheter Indwelling Catheter


 


  # Bowel Movements 1  














- Exam





Gen: awake, alert and oriented 3


HEENT: normocephalic, atraumatic, good hearing acuity, moist mucous membranes


Resp: good air exchange, breathing comfortably with no accessory muscle use


CVS: good distal perfusion x 4,


GI: soft, NTTP, ND


: no SPT, bilateral CVAT, herndon catheter is present, grossly hemorrhagic


MSK: no pitting edema, no clubbing


Neuro: non-focal, moving all extremities


Psych: cooperative, euthymic mood   





- Labs


CBC & Chem 7: 


                                 10/16/21 10:50





                                 10/15/21 06:43


Labs: 


                  Abnormal Lab Results - Last 24 Hours (Table)











  10/14/21 10/15/21 10/16/21 Range/Units





  10:50 19:53 06:29 


 


RBC     (4.30-5.90)  m/uL


 


Hgb     (13.0-17.5)  gm/dL


 


Hct     (39.0-53.0)  %


 


Plt Count     (150-450)  k/uL


 


Lymphocytes #     (1.0-4.8)  k/uL


 


POC Glucose (mg/dL)   176 H  159 H  (75-99)  mg/dL


 


Hep Bs Antibody  NonReactive A    (Nonreactive)  














  10/16/21 Range/Units





  10:50 


 


RBC  2.68 L  (4.30-5.90)  m/uL


 


Hgb  8.6 L  (13.0-17.5)  gm/dL


 


Hct  26.6 L  (39.0-53.0)  %


 


Plt Count  110 L  (150-450)  k/uL


 


Lymphocytes #  0.5 L  (1.0-4.8)  k/uL


 


POC Glucose (mg/dL)   (75-99)  mg/dL


 


Hep Bs Antibody   (Nonreactive)  








                      Microbiology - Last 24 Hours (Table)











 10/12/21 13:57 Gram Stain - Preliminary





 Aspirate Body Fluid Culture - Preliminary


 


 10/12/21 18:40 Blood Culture - Preliminary





 Blood    No Growth after 72 hours


 


 10/12/21 13:57 Anaerobic Culture - Preliminary





 Aspirate 














Assessment and Plan


Assessment: 





Bilateral hydronephrosis


Obstructive nephrolithiasis


Post renal Acute kidney injury


-Admit inpatient, ICU with Pulm consult


-Nephrology consult; hemodialysis 10/14, 10/15, 10/16


-Urology consult stat for nephroureteral stent completed on 10/12


-Pain control: Dilaudid when necessary 


-Stool softener


-Nausea control


-IV fluids, hourly urine output


-Bicarb drip discontinued;


-Started on calcium acetate, Phos improving


-replete Ca, Mg, K as necessary








Diabetes


Hypertension


Hyperlipidemia


-Home medications reviewed and reconciled 





Patient is full code


DVT prophylaxis with heparin 3 times a day

## 2021-10-17 LAB
GLUCOSE BLD-MCNC: 143 MG/DL (ref 75–99)
GLUCOSE BLD-MCNC: 153 MG/DL (ref 75–99)
GLUCOSE BLD-MCNC: 172 MG/DL (ref 75–99)
GLUCOSE BLD-MCNC: 87 MG/DL (ref 75–99)

## 2021-10-17 RX ADMIN — NOREPINEPHRINE BITARTRATE SCH: 1 INJECTION, SOLUTION, CONCENTRATE INTRAVENOUS at 06:12

## 2021-10-17 RX ADMIN — Medication SCH MG: at 17:33

## 2021-10-17 RX ADMIN — INSULIN ASPART SCH: 100 INJECTION, SOLUTION INTRAVENOUS; SUBCUTANEOUS at 07:51

## 2021-10-17 RX ADMIN — INSULIN ASPART SCH UNIT: 100 INJECTION, SOLUTION INTRAVENOUS; SUBCUTANEOUS at 12:38

## 2021-10-17 RX ADMIN — INSULIN DETEMIR SCH UNIT: 100 INJECTION, SOLUTION SUBCUTANEOUS at 20:45

## 2021-10-17 RX ADMIN — CEFAZOLIN SCH MLS/HR: 330 INJECTION, POWDER, FOR SOLUTION INTRAMUSCULAR; INTRAVENOUS at 06:12

## 2021-10-17 RX ADMIN — INSULIN ASPART SCH UNIT: 100 INJECTION, SOLUTION INTRAVENOUS; SUBCUTANEOUS at 17:33

## 2021-10-17 RX ADMIN — Medication SCH MG: at 08:13

## 2021-10-17 RX ADMIN — Medication SCH MG: at 12:37

## 2021-10-17 NOTE — P.PN
Subjective





Underwent bilateral ureteral stent placement on 10/12 , for bilateral 

hydronephrosis. creat 3.1 this am, denies any flank pain.  





Objective





- Vital Signs


Vital signs: 


                                   Vital Signs











Temp  98.8 F   10/17/21 12:41


 


Pulse  59 L  10/17/21 12:41


 


Resp  16   10/17/21 12:41


 


BP  157/83   10/17/21 12:41


 


Pulse Ox  95   10/17/21 12:41








                                 Intake & Output











 10/16/21 10/17/21 10/17/21





 18:59 06:59 18:59


 


Intake Total 1725 951 3795


 


Output Total 2090 800 650


 


Balance -


 


Weight  104 kg 


 


Intake:   


 


   400 600


 


    Sodium Chloride 0.9% 1, 600 400 600





    000 ml @ 50 mls/hr IV .   





    Q20H [facility] Rx#:[id number]   


 


  Oral 


 


  Hemodialysis 300  


 


Output:   


 


  Urine 790 800 650


 


    Uretheral ([place])  800 


 


  Hemodialysis 1300  


 


Other:   


 


  Voiding Method Indwelling Catheter Indwelling Catheter Indwelling Catheter


 


  # Voids 1  1


 


  # Bowel Movements   2














- Constitutional


General appearance: Present: no acute distress





- Psychiatric


Psychiatric: Present: A&O x's 3





- Labs


CBC & Chem 7: 


                                 10/16/21 10:50





                                 10/16/21 15:30


Labs: 


                  Abnormal Lab Results - Last 24 Hours (Table)











  10/16/21 10/16/21 10/16/21 Range/Units





  15:30 17:22 20:05 


 


Chloride  109 H    ()  mmol/L


 


BUN  31 H    (9-20)  mg/dL


 


Creatinine  3.17 H    (0.66-1.25)  mg/dL


 


Glucose  122 H    (74-99)  mg/dL


 


POC Glucose (mg/dL)   130 H  191 H  (75-99)  mg/dL


 


Calcium  7.4 L    (8.4-10.2)  mg/dL


 


AST  63 H    (17-59)  U/L


 


Total Protein  4.8 L    (6.3-8.2)  g/dL


 


Albumin  2.7 L    (3.5-5.0)  g/dL














  10/17/21 Range/Units





  11:40 


 


Chloride   ()  mmol/L


 


BUN   (9-20)  mg/dL


 


Creatinine   (0.66-1.25)  mg/dL


 


Glucose   (74-99)  mg/dL


 


POC Glucose (mg/dL)  153 H  (75-99)  mg/dL


 


Calcium   (8.4-10.2)  mg/dL


 


AST   (17-59)  U/L


 


Total Protein   (6.3-8.2)  g/dL


 


Albumin   (3.5-5.0)  g/dL








                      Microbiology - Last 24 Hours (Table)











 10/12/21 13:57 Anaerobic Culture - Final





 Aspirate 


 


 10/12/21 13:57 Gram Stain - Final





 Aspirate Body Fluid Culture - Final


 


 10/12/21 18:40 Blood Culture - Preliminary





 Blood    No Growth after 96 hours














Assessment and Plan


Assessment: 





71-year-old male admitted to the hospital with renal failure, underwent 

bilateral ureteral stent placement on October 12.  urine output improving, 

creatinine 3.1


-Can remove the herndon catheter prior to discharge 


-Will arrange for outpatient bilateral Ureteroscopies to address his kidney 

stones

## 2021-10-17 NOTE — P.PN
Subjective


Progress Note Date: 10/17/21





Patient is doing well today, mentation is significantly improved.  Patient 

remains quite weak and requires 2 person max assist for bed transfers.  

Nutrition is improving, but still not adequate.





Objective





- Vital Signs


Vital signs: 


                                   Vital Signs











Temp  97.8 F   10/17/21 05:07


 


Pulse  62   10/17/21 05:07


 


Resp  16   10/17/21 05:07


 


BP  137/77   10/17/21 05:07


 


Pulse Ox  94 L  10/17/21 05:07








                                 Intake & Output











 10/16/21 10/17/21 10/17/21





 18:59 06:59 18:59


 


Intake Total 1730 990 


 


Output Total 2090 800 


 


Balance -360 190 


 


Weight  104 kg 


 


Intake:   


 


   400 


 


    Sodium Chloride 0.9% 1, 600 400 





    000 ml @ 50 mls/hr IV .   





    Q20H [facility] Rx#:[id number]   


 


  Oral 830 590 


 


  Hemodialysis 300  


 


Output:   


 


  Urine 790 800 


 


    Uretheral ([place])  800 


 


  Hemodialysis 1300  


 


Other:   


 


  Voiding Method Indwelling Catheter Indwelling Catheter Indwelling Catheter


 


  # Voids 1  1














- Exam





Gen: awake, alert and oriented 3


HEENT: normocephalic, atraumatic, good hearing acuity, moist mucous membranes


Resp: good air exchange, breathing comfortably with no accessory muscle use


CVS: good distal perfusion x 4,


GI: soft, NTTP, ND


: no SPT, bilateral CVAT, herndon catheter is present, grossly hemorrhagic


MSK: no pitting edema, no clubbing


Neuro: non-focal, moving all extremities


Psych: cooperative, euthymic mood    





- Labs


CBC & Chem 7: 


                                 10/16/21 10:50





                                 10/16/21 15:30


Labs: 


                  Abnormal Lab Results - Last 24 Hours (Table)











  10/16/21 10/16/21 10/16/21 Range/Units





  10:50 15:30 17:22 


 


RBC  2.68 L    (4.30-5.90)  m/uL


 


Hgb  8.6 L    (13.0-17.5)  gm/dL


 


Hct  26.6 L    (39.0-53.0)  %


 


Plt Count  110 L    (150-450)  k/uL


 


Lymphocytes #  0.5 L    (1.0-4.8)  k/uL


 


Chloride   109 H   ()  mmol/L


 


BUN   31 H   (9-20)  mg/dL


 


Creatinine   3.17 H   (0.66-1.25)  mg/dL


 


Glucose   122 H   (74-99)  mg/dL


 


POC Glucose (mg/dL)    130 H  (75-99)  mg/dL


 


Calcium   7.4 L   (8.4-10.2)  mg/dL


 


AST   63 H   (17-59)  U/L


 


Total Protein   4.8 L   (6.3-8.2)  g/dL


 


Albumin   2.7 L   (3.5-5.0)  g/dL














  10/16/21 Range/Units





  20:05 


 


RBC   (4.30-5.90)  m/uL


 


Hgb   (13.0-17.5)  gm/dL


 


Hct   (39.0-53.0)  %


 


Plt Count   (150-450)  k/uL


 


Lymphocytes #   (1.0-4.8)  k/uL


 


Chloride   ()  mmol/L


 


BUN   (9-20)  mg/dL


 


Creatinine   (0.66-1.25)  mg/dL


 


Glucose   (74-99)  mg/dL


 


POC Glucose (mg/dL)  191 H  (75-99)  mg/dL


 


Calcium   (8.4-10.2)  mg/dL


 


AST   (17-59)  U/L


 


Total Protein   (6.3-8.2)  g/dL


 


Albumin   (3.5-5.0)  g/dL








                      Microbiology - Last 24 Hours (Table)











 10/12/21 13:57 Gram Stain - Final





 Aspirate Body Fluid Culture - Final


 


 10/12/21 18:40 Blood Culture - Preliminary





 Blood    No Growth after 96 hours














Assessment and Plan


Assessment: 





Bilateral hydronephrosis


Obstructive nephrolithiasis


Post renal Acute kidney injury


-Admit inpatient


-Nephrology consult; hemodialysis 10/14, 10/15, 10/16


-Urology consult stat for nephroureteral stent completed on 10/12


-Pain control: Dilaudid when necessary 


-Stool softener


-Nausea control


-IV fluids, hourly urine output


-Bicarb drip discontinued;


-Started on calcium acetate, Phos improving


-replete Ca, Mg, K as necessary


-PT consult; OOB for meals TID








Diabetes


Hypertension


Hyperlipidemia


-Home medications reviewed and reconciled 





Patient is full code


DVT prophylaxis with heparin 3 times a day

## 2021-10-17 NOTE — P.PN
Subjective


Progress Note Date: 10/17/21


Principal diagnosis: 





Pt is seen for f/u for ROXY. Doing fairly well. No complaints of SOB/n/v/d.


Oral intake slowly improving.


HD on hold today.


Good UOP. 1590/ 24 hrs


Cr 3.1 today.





Objective





- Vital Signs


Vital signs: 


                                   Vital Signs











Temp  98.8 F   10/17/21 12:41


 


Pulse  59 L  10/17/21 12:41


 


Resp  16   10/17/21 12:41


 


BP  157/83   10/17/21 12:41


 


Pulse Ox  95   10/17/21 12:41








                                 Intake & Output











 10/16/21 10/17/21 10/17/21





 18:59 06:59 18:59


 


Intake Total 1730 990 


 


Output Total 2090 800 


 


Balance -360 190 


 


Weight  104 kg 


 


Intake:   


 


   400 


 


    Sodium Chloride 0.9% 1, 600 400 





    000 ml @ 50 mls/hr IV .   





    Q20H [facility] Rx#:[id number]   


 


  Oral 830 590 


 


  Hemodialysis 300  


 


Output:   


 


  Urine 790 800 


 


    Uretheral ([facility])  800 


 


  Hemodialysis 1300  


 


Other:   


 


  Voiding Method Indwelling Catheter Indwelling Catheter Indwelling Catheter


 


  # Voids 1  1














- Exam





Pt is awake alert, oriented x3.


No acute distress.


Lungs are clear.


CVS S1 and S2. No rub.


Abdomen is soft, nontender


Extremities show no significant edema.


CNS exam grossly intact. No motor deficits noted.





- Labs


CBC & Chem 7: 


                                 10/16/21 10:50





                                 10/16/21 15:30


Labs: 


                  Abnormal Lab Results - Last 24 Hours (Table)











  10/16/21 10/16/21 10/16/21 Range/Units





  15:30 17:22 20:05 


 


Chloride  109 H    ()  mmol/L


 


BUN  31 H    (9-20)  mg/dL


 


Creatinine  3.17 H    (0.66-1.25)  mg/dL


 


Glucose  122 H    (74-99)  mg/dL


 


POC Glucose (mg/dL)   130 H  191 H  (75-99)  mg/dL


 


Calcium  7.4 L    (8.4-10.2)  mg/dL


 


AST  63 H    (17-59)  U/L


 


Total Protein  4.8 L    (6.3-8.2)  g/dL


 


Albumin  2.7 L    (3.5-5.0)  g/dL














  10/17/21 Range/Units





  11:40 


 


Chloride   ()  mmol/L


 


BUN   (9-20)  mg/dL


 


Creatinine   (0.66-1.25)  mg/dL


 


Glucose   (74-99)  mg/dL


 


POC Glucose (mg/dL)  153 H  (75-99)  mg/dL


 


Calcium   (8.4-10.2)  mg/dL


 


AST   (17-59)  U/L


 


Total Protein   (6.3-8.2)  g/dL


 


Albumin   (3.5-5.0)  g/dL








                      Microbiology - Last 24 Hours (Table)











 10/12/21 13:57 Anaerobic Culture - Final





 Aspirate 


 


 10/12/21 13:57 Gram Stain - Final





 Aspirate Body Fluid Culture - Final


 


 10/12/21 18:40 Blood Culture - Preliminary





 Blood    No Growth after 96 hours














Assessment and Plan


Assessment: 





1. ROXY, ATN,obstructive uropathy, started on HD on 10/14/21 due to severe renal 

failure and uremia.Currently with good UOP. HD on hold today. Assess for 

recovery of renal function and possible HD in am based on labs in am.


2. Metabolic acidosis secondary to renal failure, possibly related to metformin,

improved with RRT.


3. Hyperkalemia secondary to ROXY and obstructive uropathy. Resolved.


4. Bilateral hydronephrosis with bilateral ureteral calculi s/p bilateral 

stents. Good UOP.


5. Hyperphosphatemia secondary to renal failure, on phos binders.


Plan: 





1. Continue to avoid nephrotoxic agents.


2. Repeat labs in am and possible HD tomorrow based on labs.


3. Check phos.


4. Encouraged increased oral intake.

## 2021-10-18 LAB
ALBUMIN SERPL-MCNC: 2.5 G/DL (ref 3.5–5)
ALBUMIN/GLOB SERPL: 1.2 {RATIO}
ALP SERPL-CCNC: 75 U/L (ref 38–126)
ALT SERPL-CCNC: 28 U/L (ref 4–49)
ANION GAP SERPL CALC-SCNC: 3 MMOL/L
AST SERPL-CCNC: 59 U/L (ref 17–59)
BASOPHILS # BLD AUTO: 0 K/UL (ref 0–0.2)
BASOPHILS NFR BLD AUTO: 0 %
BUN SERPL-SCNC: 35 MG/DL (ref 9–20)
CALCIUM SPEC-MCNC: 7.2 MG/DL (ref 8.4–10.2)
CHLORIDE SERPL-SCNC: 109 MMOL/L (ref 98–107)
CO2 SERPL-SCNC: 30 MMOL/L (ref 22–30)
EOSINOPHIL # BLD AUTO: 0.3 K/UL (ref 0–0.7)
EOSINOPHIL NFR BLD AUTO: 4 %
ERYTHROCYTE [DISTWIDTH] IN BLOOD BY AUTOMATED COUNT: 2.69 M/UL (ref 4.3–5.9)
ERYTHROCYTE [DISTWIDTH] IN BLOOD: 12.1 % (ref 11.5–15.5)
GLOBULIN SER CALC-MCNC: 2.1 G/DL
GLUCOSE BLD-MCNC: 133 MG/DL (ref 75–99)
GLUCOSE BLD-MCNC: 143 MG/DL (ref 75–99)
GLUCOSE BLD-MCNC: 171 MG/DL (ref 75–99)
GLUCOSE BLD-MCNC: 96 MG/DL (ref 75–99)
GLUCOSE SERPL-MCNC: 126 MG/DL (ref 74–99)
HCT VFR BLD AUTO: 26.3 % (ref 39–53)
HGB BLD-MCNC: 8.7 GM/DL (ref 13–17.5)
LYMPHOCYTES # SPEC AUTO: 0.9 K/UL (ref 1–4.8)
LYMPHOCYTES NFR SPEC AUTO: 14 %
MCH RBC QN AUTO: 32.3 PG (ref 25–35)
MCHC RBC AUTO-ENTMCNC: 33.1 G/DL (ref 31–37)
MCV RBC AUTO: 97.6 FL (ref 80–100)
MONOCYTES # BLD AUTO: 0.4 K/UL (ref 0–1)
MONOCYTES NFR BLD AUTO: 6 %
NEUTROPHILS # BLD AUTO: 5 K/UL (ref 1.3–7.7)
NEUTROPHILS NFR BLD AUTO: 73 %
PLATELET # BLD AUTO: 116 K/UL (ref 150–450)
POTASSIUM SERPL-SCNC: 3.2 MMOL/L (ref 3.5–5.1)
PROT SERPL-MCNC: 4.6 G/DL (ref 6.3–8.2)
SODIUM SERPL-SCNC: 142 MMOL/L (ref 137–145)
WBC # BLD AUTO: 6.8 K/UL (ref 3.8–10.6)

## 2021-10-18 RX ADMIN — CEFAZOLIN SCH: 330 INJECTION, POWDER, FOR SOLUTION INTRAMUSCULAR; INTRAVENOUS at 18:41

## 2021-10-18 RX ADMIN — HEPARIN SODIUM SCH UNIT: 5000 INJECTION INTRAVENOUS; SUBCUTANEOUS at 15:52

## 2021-10-18 RX ADMIN — INSULIN ASPART SCH UNIT: 100 INJECTION, SOLUTION INTRAVENOUS; SUBCUTANEOUS at 08:08

## 2021-10-18 RX ADMIN — INSULIN ASPART SCH: 100 INJECTION, SOLUTION INTRAVENOUS; SUBCUTANEOUS at 11:50

## 2021-10-18 RX ADMIN — Medication SCH MG: at 13:16

## 2021-10-18 RX ADMIN — HEPARIN SODIUM SCH UNIT: 5000 INJECTION INTRAVENOUS; SUBCUTANEOUS at 22:51

## 2021-10-18 RX ADMIN — INSULIN DETEMIR SCH UNIT: 100 INJECTION, SOLUTION SUBCUTANEOUS at 20:57

## 2021-10-18 RX ADMIN — NOREPINEPHRINE BITARTRATE SCH: 1 INJECTION, SOLUTION, CONCENTRATE INTRAVENOUS at 03:32

## 2021-10-18 RX ADMIN — Medication SCH MG: at 18:02

## 2021-10-18 RX ADMIN — INSULIN ASPART SCH UNIT: 100 INJECTION, SOLUTION INTRAVENOUS; SUBCUTANEOUS at 18:02

## 2021-10-18 RX ADMIN — CEFAZOLIN SCH MLS/HR: 330 INJECTION, POWDER, FOR SOLUTION INTRAMUSCULAR; INTRAVENOUS at 03:31

## 2021-10-18 RX ADMIN — Medication SCH MG: at 08:08

## 2021-10-18 NOTE — P.PN
Subjective


Progress Note Date: 10/18/21





Pt is greatly improved in terms of mentation, also quickly improving in physical

debility.  Now able to transfer to chair with min-assist.  Nutrition is modestly

improved, needs to increase calorie intake.  Mildly worse to stable BUN/Cr since

dialysis held yesterday, but expected, and UOP is good.





Objective





- Vital Signs


Vital signs: 


                                   Vital Signs











Temp  98.7 F   10/18/21 12:11


 


Pulse  61   10/18/21 12:11


 


Resp  18   10/18/21 12:11


 


BP  158/85   10/18/21 12:11


 


Pulse Ox  96   10/18/21 12:11








                                 Intake & Output











 10/17/21 10/18/21 10/18/21





 18:59 06:59 18:59


 


Intake Total 2240 400 


 


Output Total 650 800 


 


Balance 1590 -400 


 


Weight  108 kg 


 


Intake:   


 


   400 


 


    Sodium Chloride 0.9% 1, 600 400 





    000 ml @ 50 mls/hr IV .   





    Q20H [facility] Rx#:[id number]   


 


  Oral 1640  


 


Output:   


 


  Urine 650 800 


 


Other:   


 


  Voiding Method Indwelling Catheter Indwelling Catheter Indwelling Catheter


 


  # Voids 1  


 


  # Bowel Movements 2  1














- Exam





Gen: awake, alert and oriented 3


HEENT: normocephalic, atraumatic, good hearing acuity, moist mucous membranes


Resp: good air exchange, breathing comfortably with no accessory muscle use


CVS: good distal perfusion x 4,


GI: soft, NTTP, ND


: no SPT, bilateral CVAT, herndon catheter is present, grossly hemorrhagic


MSK: no pitting edema, no clubbing


Neuro: non-focal, moving all extremities


Psych: cooperative, euthymic mood     





- Labs


CBC & Chem 7: 


                                 10/18/21 08:11





                                 10/18/21 08:11


Labs: 


                  Abnormal Lab Results - Last 24 Hours (Table)











  10/17/21 10/17/21 10/18/21 Range/Units





  16:52 20:26 07:26 


 


RBC     (4.30-5.90)  m/uL


 


Hgb     (13.0-17.5)  gm/dL


 


Hct     (39.0-53.0)  %


 


Plt Count     (150-450)  k/uL


 


Lymphocytes #     (1.0-4.8)  k/uL


 


Potassium     (3.5-5.1)  mmol/L


 


Chloride     ()  mmol/L


 


BUN     (9-20)  mg/dL


 


Creatinine     (0.66-1.25)  mg/dL


 


Glucose     (74-99)  mg/dL


 


POC Glucose (mg/dL)  172 H  143 H  143 H  (75-99)  mg/dL


 


Calcium     (8.4-10.2)  mg/dL


 


Total Protein     (6.3-8.2)  g/dL


 


Albumin     (3.5-5.0)  g/dL














  10/18/21 10/18/21 Range/Units





  08:11 08:11 


 


RBC  2.69 L   (4.30-5.90)  m/uL


 


Hgb  8.7 L   (13.0-17.5)  gm/dL


 


Hct  26.3 L   (39.0-53.0)  %


 


Plt Count  116 L   (150-450)  k/uL


 


Lymphocytes #  0.9 L   (1.0-4.8)  k/uL


 


Potassium   3.2 L  (3.5-5.1)  mmol/L


 


Chloride   109 H  ()  mmol/L


 


BUN   35 H  (9-20)  mg/dL


 


Creatinine   3.45 H  (0.66-1.25)  mg/dL


 


Glucose   126 H  (74-99)  mg/dL


 


POC Glucose (mg/dL)    (75-99)  mg/dL


 


Calcium   7.2 L  (8.4-10.2)  mg/dL


 


Total Protein   4.6 L  (6.3-8.2)  g/dL


 


Albumin   2.5 L  (3.5-5.0)  g/dL








                      Microbiology - Last 24 Hours (Table)











 10/12/21 18:40 Blood Culture - Preliminary





 Blood    No Growth after 120 hours


 


 10/12/21 13:57 Anaerobic Culture - Final





 Aspirate 














Assessment and Plan


Assessment: 





Bilateral hydronephrosis


Obstructive nephrolithiasis


Post renal Acute kidney injury


-Admit inpatient


-Nephrology consult; hemodialysis 10/14, 10/15, 10/16


-Urology consult stat for nephroureteral stent completed on 10/12


-Pain control: Dilaudid when necessary 


-Stool softener


-Nausea control


-IV fluids, hourly urine output


-Bicarb drip discontinued;


-Started on calcium acetate, Phos improving


-replete Ca, Mg, K as necessary


-PT consult; OOB for meals TID








Diabetes


Hypertension


Hyperlipidemia


-Home medications reviewed and reconciled 





Patient is full code


DVT prophylaxis with heparin 3 times a day

## 2021-10-18 NOTE — PN
PROGRESS NOTE



Patient is seen for followup for acute kidney injury.  He is currently sitting up in

bed, comfortable, not in any acute distress.  Patient's wife is present at bedside.  He

has had good urine output with 24 hour urine output documented at about 2.8 L.  The

patient's dialysis was held yesterday.  His serum creatinine is 3.45 from 3.1 prior to

that.  Overall, he states he feels fair with some improvement in appetite.



PHYSICAL EXAMINATION:

On examination today, blood pressure was 158/85, heart rate 61 per minute, he is

afebrile.  Examination of the heart S1, S2.  Examination of lungs, decreased breath

sounds at the bases.  Abdomen is soft, nontender, obese.  Examination of lower

extremities shows trace edema bilaterally.  CNS exam grossly intact.



LAB:

Show hemoglobin 8.7, sodium 142, potassium 3.2, chloride 109, BUN 35, creatinine 3.45.



ASSESSMENT:

1. Acute kidney injury, multifactorial, including obstructive uropathy and acute

    tubular necrosis.  Start hemodialysis on 10/14/2021 due to severe renal failure and

    uremia.  I will continue to hold off on dialysis today, although serum creatinine

    is slightly higher.  We will reassess in a.m.  He continues to have good urine

    output.

2. Metabolic acidosis associated with renal failure, possibly related to metformin,

    improved with renal replacement therapy.

3. Hyperkalemia secondary to acute kidney injury and obstructive uropathy, now

    improved.

4. Bilateral hydronephrosis with bilateral ureteral calculi status post bilateral

    stents.

5. Hyperphosphatemia associated with renal failure, now improved.



PLAN:

Continue to hold dialysis today.  Repeat labs in a.m.  Add phosphorus to today's labs

and replace potassium cautiously.





MMODL / IJN: [id number] / Job#: [id number]

## 2021-10-18 NOTE — FL
EXAMINATION TYPE: FL barium swallow w video

 

DATE OF EXAM: 10/18/2021

 

MODIFIED SWALLOW / DEGLUTITION STUDY

 

CLINICAL HISTORY: Frequent throat clearing

 

TECHNIQUE:  Deglutition study is performed utilizing thin liquid barium, barium thick pudding, and ba
rium coated cracker. 1 minute 50 seconds fluoroscopy time

 

COMPARISON: None.

 

FINDINGS: The oral and pharyngeal phases show satisfactory initiation and propagation with all modali
ties tested.  Normal mastication is seen with solid modalities tested.  There is penetration without 
kunal aspiration with thin liquids. 

 

IMPRESSION: Penetration without kunal aspiration with thin liquids. Please refer to speech therapist 
notes for further details if necessary.

## 2021-10-19 LAB
ANION GAP SERPL CALC-SCNC: 4 MMOL/L
BUN SERPL-SCNC: 31 MG/DL (ref 9–20)
CALCIUM SPEC-MCNC: 7.2 MG/DL (ref 8.4–10.2)
CHLORIDE SERPL-SCNC: 110 MMOL/L (ref 98–107)
CO2 SERPL-SCNC: 28 MMOL/L (ref 22–30)
GLUCOSE BLD-MCNC: 106 MG/DL (ref 75–99)
GLUCOSE BLD-MCNC: 137 MG/DL (ref 75–99)
GLUCOSE BLD-MCNC: 160 MG/DL (ref 75–99)
GLUCOSE BLD-MCNC: 75 MG/DL (ref 75–99)
GLUCOSE SERPL-MCNC: 66 MG/DL (ref 74–99)
POTASSIUM SERPL-SCNC: 3.1 MMOL/L (ref 3.5–5.1)
SODIUM SERPL-SCNC: 142 MMOL/L (ref 137–145)

## 2021-10-19 RX ADMIN — INSULIN DETEMIR SCH UNIT: 100 INJECTION, SOLUTION SUBCUTANEOUS at 21:11

## 2021-10-19 RX ADMIN — Medication SCH MG: at 12:30

## 2021-10-19 RX ADMIN — INSULIN ASPART SCH: 100 INJECTION, SOLUTION INTRAVENOUS; SUBCUTANEOUS at 12:23

## 2021-10-19 RX ADMIN — INSULIN ASPART SCH: 100 INJECTION, SOLUTION INTRAVENOUS; SUBCUTANEOUS at 17:55

## 2021-10-19 RX ADMIN — Medication SCH MG: at 08:31

## 2021-10-19 RX ADMIN — CEFAZOLIN SCH MLS/HR: 330 INJECTION, POWDER, FOR SOLUTION INTRAMUSCULAR; INTRAVENOUS at 15:47

## 2021-10-19 RX ADMIN — INSULIN ASPART SCH: 100 INJECTION, SOLUTION INTRAVENOUS; SUBCUTANEOUS at 07:37

## 2021-10-19 RX ADMIN — HEPARIN SODIUM SCH UNIT: 5000 INJECTION INTRAVENOUS; SUBCUTANEOUS at 23:56

## 2021-10-19 RX ADMIN — HEPARIN SODIUM SCH UNIT: 5000 INJECTION INTRAVENOUS; SUBCUTANEOUS at 15:47

## 2021-10-19 RX ADMIN — HEPARIN SODIUM SCH UNIT: 5000 INJECTION INTRAVENOUS; SUBCUTANEOUS at 08:31

## 2021-10-19 RX ADMIN — CEFAZOLIN SCH MLS/HR: 330 INJECTION, POWDER, FOR SOLUTION INTRAMUSCULAR; INTRAVENOUS at 23:54

## 2021-10-19 NOTE — PN
PROGRESS NOTE



The patient is seen for followup for acute kidney injury.  His dialysis was held

yesterday for possibility of recovery of renal function.  The patient has had good

urine output.  He is currently awake, comfortable, not in any acute distress.  This

morning creatinine came back again at 3.4.  Therefore, we will continue to hold off on

the dialysis for now.



PHYSICAL EXAMINATION:

Blood pressure was 157/44, heart rate 57 per minute. Patient is afebrile.  Examination

of the heart S1, S2.  Examination of the lungs, bilateral breath sounds are heard.

Abdomen is soft, nontender.  Examination of lower extremities shows trace edema

bilaterally.  CNS exam grossly intact.



LAB:

Show sodium 142, potassium 3.1, chloride 110, CO2 is 28, BUN 31, creatinine 3.42.



ASSESSMENT:

1. Acute kidney injury, ATN, as well as obstructive uropathy status post bilateral

    ureteral stents, status post dialysis which was started on 10/14/2021.  The

    patient's hemodialysis has been on hold for the last two days.  Serum creatinine is

    stable.  We will recheck again in a.m.  Most likely he will be able to come off

    dialysis.

2. Bilateral hydronephrosis, bilateral ureteral calculi status post bilateral ureteral

    stent placement.

3. Hyperphosphatemia associated with renal failure, improving.

4. Metabolic acidosis associated with renal failure, possibly related to metformin.

5. Hyperkalemia associated with acute kidney injury, obstructive uropathy, now

    improved.



PLAN:

DC PhosLo, hold dialysis and repeat labs again in a.m.





MARCIE / ERIC: [id number] / Job#: [id number]

## 2021-10-19 NOTE — P.PN
Subjective


Progress Note Date: 10/19/21





Pt is greatly improved in terms of mentation, also quickly improving in physical

debility.  Now able ambulate with min-assist.  Nutrition is improved, calorie 

intake adequate.  Stable BUN/Cr since dialysis held on 10/17, UOP is good, and 

has cleared of bloody tinge. 





Objective





- Vital Signs


Vital signs: 


                                   Vital Signs











Temp  98.2 F   10/19/21 12:46


 


Pulse  57 L  10/19/21 12:46


 


Resp  16   10/19/21 12:46


 


BP  157/44   10/19/21 12:46


 


Pulse Ox  98   10/19/21 12:46








                                 Intake & Output











 10/18/21 10/19/21 10/19/21





 18:59 06:59 18:59


 


Intake Total 600  


 


Output Total 800 1000 


 


Balance -200 -1000 


 


Weight  106.5 kg 106.5 kg


 


Intake:   


 


    


 


    Sodium Chloride 0.9% 1, 600  





    000 ml @ 50 mls/hr IV .   





    Q20H [facility] Rx#:[id number]   


 


Output:   


 


  Urine 800 1000 


 


Other:   


 


  Voiding Method Indwelling Catheter Indwelling Catheter Indwelling Catheter


 


  # Bowel Movements 1  














- Exam





Gen: awake, alert and oriented 3


HEENT: normocephalic, atraumatic, good hearing acuity, moist mucous membranes


Resp: good air exchange, breathing comfortably with no accessory muscle use


CVS: good distal perfusion x 4,


GI: soft, NTTP, ND


: no SPT, bilateral CVAT, herndon catheter is present, grossly hemorrhagic


MSK: no pitting edema, no clubbing


Neuro: non-focal, moving all extremities


Psych: cooperative, euthymic mood      





- Labs


CBC & Chem 7: 


                                 10/18/21 08:11





                                 10/19/21 05:33


Labs: 


                  Abnormal Lab Results - Last 24 Hours (Table)











  10/18/21 10/18/21 10/19/21 Range/Units





  17:12 20:51 05:33 


 


Potassium    3.1 L  (3.5-5.1)  mmol/L


 


Chloride    110 H  ()  mmol/L


 


BUN    31 H  (9-20)  mg/dL


 


Creatinine    3.42 H  (0.66-1.25)  mg/dL


 


Glucose    66 L  (74-99)  mg/dL


 


POC Glucose (mg/dL)  171 H  133 H   (75-99)  mg/dL


 


Calcium    7.2 L  (8.4-10.2)  mg/dL














  10/19/21 Range/Units





  12:17 


 


Potassium   (3.5-5.1)  mmol/L


 


Chloride   ()  mmol/L


 


BUN   (9-20)  mg/dL


 


Creatinine   (0.66-1.25)  mg/dL


 


Glucose   (74-99)  mg/dL


 


POC Glucose (mg/dL)  106 H  (75-99)  mg/dL


 


Calcium   (8.4-10.2)  mg/dL








                      Microbiology - Last 24 Hours (Table)











 10/12/21 18:40 Blood Culture - Final





 Blood    No Growth after 144 hours














Assessment and Plan


Assessment: 





Bilateral hydronephrosis


Obstructive nephrolithiasis


Post renal Acute kidney injury


-Admit inpatient


-Nephrology consult; hemodialysis 10/14, 10/15, 10/16


-Urology consult stat for nephroureteral stent completed on 10/12


-Pain control: Dilaudid when necessary 


-Stool softener


-Nausea control


-IV fluids, hourly urine output


-Bicarb drip discontinued;


-Started on calcium acetate, Phos improving


-replete Ca, Mg, K as necessary


-PT consult; Up ad tone








Diabetes


Hypertension


Hyperlipidemia


-Home medications reviewed and reconciled 





Patient is full code


DVT prophylaxis with heparin 3 times a day

## 2021-10-19 NOTE — P.CONS
History of Present Illness





- Chief Complaint


Medical debility





- History of Present Illness





I had the opportunity to see patient for inpatient rehab consultation with 

regard to medical debility.  Patient admitted to [facility] October 12 with 

generalized weakness and bilateral flank pain and known history of kidney 

stones.  Note diagnosis and treatment for covert pneumonia April this year has 

since been fairly week.  Seen by Dr. Burgos notes acute kidney injury with 

hyperkalemia.  Seen by Dr. Almonte who did perform cystoscopy and right and left 

stents.  Chest x-ray demonstrates left effusion and infiltrate.  CT abdomen and 

pelvis demonstrates bilateral hydronephrosis.  Guarded therapies.  PT reports s

upervision for bed mobility, transfers, gait 40 feet with roller walker.  OT 

reports supervision for all basic self-care tasks.  Speech therapy saw swallow 

and within functional limits.





Previous functional history as elicited from patient: 71-year-old right-handed 

white male who is  lives and 2 floor home with wife.  Since April the 

wife is benign cooking, laundry, driving.  Both retired.  Patient is scheduled 

standing shower, gait without device.  PCP Dr. Marie.





Review of Systems





Review of systems:


ENT: Denies sneezes or discharge.


Eyes: Denies discharge or photophobia.


Cardiac: Denies chest pain or palpitation.


Pulmonary: Denies cough or shortness of breath.


Gastrointestinal: Denies nausea, emesis, constipation, diarrhea.


Genitourinary: Today status post left flank pain and difficulty with urination.


Musculoskeletal: Denies muscle or bone aches.


Neurologic: Generalized weakness.


Endocrine: Denies shakes or sweats.


Oncology: Denies cancers.


Dermatologic: Denies rash, itching, pruritus.


ALLERGY/immunology: Denies sneezes, rashes.








Past Medical History


Past Medical History: Asthma, Diabetes Mellitus, Hyperlipidemia, Hypertension, 

Skin Disorder


Additional Past Medical History / Comment(s): hx heart murmer, kidney stones, 

umbilical hernia,


History of Any Multi-Drug Resistant Organisms: None Reported


Past Surgical History: Orthopedic Surgery


Additional Past Surgical History / Comment(s): rt knee surgery, mole removed 

from rt upper chest, rt shoulder-rotator cuff, kidney stones blasted multiple ti

mes


Past Anesthesia/Blood Transfusion Reactions: No Reported Reaction


Past Psychological History: Depression


Smoking Status: Never smoker


Past Alcohol Use History: None Reported


Past Drug Use History: None Reported





- Past Family History


  ** Sister(s)


Family Medical History: Deep Vein Thrombosis (DVT)





Medications and Allergies


                                Home Medications











 Medication  Instructions  Recorded  Confirmed  Type


 


lisinopriL [Zestril] 10 mg PO QAM 12/16/16 10/12/21 History


 


Escitalopram Oxalate [Lexapro] 30 mg PO DAILY 01/23/19 10/12/21 History


 


Multivitamins, Thera [Multivitamin 1 tab PO DAILY 01/23/19 10/12/21 History





(formulary)]    


 


Gabapentin [Neurontin] 400 mg PO TID 10/16/20 10/12/21 History


 


Lovastatin [Mevacor] 40 mg PO DAILY 10/16/20 10/12/21 History


 


Ketorolac [Toradol] 10 mg PO Q8HR #15 tab 10/05/21 10/12/21 Rx


 


Ondansetron Odt [Zofran Odt] 4 mg PO Q8HR PRN #10 tab 10/05/21 10/12/21 Rx


 


Tamsulosin [Flomax] 0.4 mg PO DAILY #7 cap 10/05/21 10/12/21 Rx


 


metFORMIN HCL ER [Glucophage XR] 1,000 mg PO BID 10/05/21 10/12/21 History


 


Semaglutide [Ozempic] 0.5 mg SQ Q7D 10/12/21 10/12/21 History








                                    Allergies











Allergy/AdvReac Type Severity Reaction Status Date / Time


 


No Known Allergies Allergy   Verified 10/12/21 08:29














Physical Exam


Vitals: 


                                   Vital Signs











  Temp Pulse Resp BP Pulse Ox


 


 10/19/21 06:45  98.3 F  58 L  18  150/72  95


 


 10/19/21 05:00  98.2 F  66  16  151/79  97


 


 10/18/21 20:36  98.5 F  65  16  154/79  97








                                Intake and Output











 10/18/21 10/19/21 10/19/21





 22:59 06:59 14:59


 


Intake Total 600  


 


Output Total 800 1000 


 


Balance -200 -1000 


 


Intake:   


 


    


 


    Sodium Chloride 0.9% 1, 600  





    000 ml @ 50 mls/hr IV .   





    Q20H [facility] Rx#:[id number]   


 


Output:   


 


  Urine 800 1000 


 


Other:   


 


  Voiding Method Indwelling Catheter  Indwelling Catheter


 


  # Bowel Movements 1  


 


  Weight  106.5 kg 














Skin: Good color, texture, turgor.


General: Medium build and comfortable appearance.  Appears younger than stated 

age.


Head: Normocephalic, atraumatic.


Eyes: Symmetric.  Pupils equal round.


Ears: Symmetric.  Hearing within normal limits.


Mouth: Clear.


Neck: Supple.  Carotid without bruit.


Cardiac: Regular rate and rhythm.


Lungs: Clear anteriorly and posteriorly.


Abdomen: Soft active nontender.


Extremities: Normal tone.


Neurological: Mental status: Alert, cooperative, pleasant.


Cranial nerves: Symmetric facial tone and trapezius.


Motor: Active movement all 4 limbs.  Legs at best antigravity.


Sensation: Intact throughout.


DTRs: Symmetric and equal throughout.


Mobility: Reports legs are weak and requires assistance for safe standing and 

mobility.





Results


CBC & Chem 7: 


                                 10/18/21 08:11





                                 10/19/21 05:33


Labs: 


                  Abnormal Lab Results - Last 24 Hours (Table)











  10/18/21 10/18/21 10/19/21 Range/Units





  17:12 20:51 05:33 


 


Potassium    3.1 L  (3.5-5.1)  mmol/L


 


Chloride    110 H  ()  mmol/L


 


BUN    31 H  (9-20)  mg/dL


 


Creatinine    3.42 H  (0.66-1.25)  mg/dL


 


Glucose    66 L  (74-99)  mg/dL


 


POC Glucose (mg/dL)  171 H  133 H   (75-99)  mg/dL


 


Calcium    7.2 L  (8.4-10.2)  mg/dL








                      Microbiology - Last 24 Hours (Table)











 10/12/21 18:40 Blood Culture - Final





 Blood    No Growth after 144 hours














Assessment and Plan


(1) Acute renal failure


Current Visit: Yes   Status: Acute   Code(s): N17.9 - ACUTE KIDNEY FAILURE, 

UNSPECIFIED   SNOMED Code(s): 06149355


   





(2) Kidney stone on left side


Current Visit: No   Status: Acute   Code(s): N20.0 - CALCULUS OF KIDNEY   SNOMED

 Code(s): 93493689


   


Plan: 





Impression:


1.  Medical debility.


2.  History covid Pneumonia this year.


3.  Acute kidney injury.


4.  Renal lithiasis.


5.  Hypertension.


6.  Dyslipidemia.


7.  Diabetes.


8.  Asthma.





Comments and plan: At this time patient does have concerns about return to home 

which appear to be legitimate.  Therapies however great patient is supervision 

or not requiring any definite physical assistance.  Can investigate possible 

inpatient rehab but in fact may require alternative placement due to anticipated

 insurance rules.

## 2021-10-20 VITALS — SYSTOLIC BLOOD PRESSURE: 151 MMHG | RESPIRATION RATE: 20 BRPM | TEMPERATURE: 98.1 F | DIASTOLIC BLOOD PRESSURE: 80 MMHG

## 2021-10-20 VITALS — HEART RATE: 62 BPM

## 2021-10-20 LAB
ANION GAP SERPL CALC-SCNC: 4 MMOL/L
BUN SERPL-SCNC: 30 MG/DL (ref 9–20)
CALCIUM SPEC-MCNC: 7.2 MG/DL (ref 8.4–10.2)
CHLORIDE SERPL-SCNC: 110 MMOL/L (ref 98–107)
CO2 SERPL-SCNC: 28 MMOL/L (ref 22–30)
GLUCOSE BLD-MCNC: 122 MG/DL (ref 75–99)
GLUCOSE BLD-MCNC: 73 MG/DL (ref 75–99)
GLUCOSE SERPL-MCNC: 116 MG/DL (ref 74–99)
POTASSIUM SERPL-SCNC: 3.9 MMOL/L (ref 3.5–5.1)
SODIUM SERPL-SCNC: 142 MMOL/L (ref 137–145)

## 2021-10-20 RX ADMIN — HEPARIN SODIUM SCH UNIT: 5000 INJECTION INTRAVENOUS; SUBCUTANEOUS at 09:48

## 2021-10-20 RX ADMIN — INSULIN ASPART SCH: 100 INJECTION, SOLUTION INTRAVENOUS; SUBCUTANEOUS at 09:36

## 2021-10-20 RX ADMIN — INSULIN ASPART SCH: 100 INJECTION, SOLUTION INTRAVENOUS; SUBCUTANEOUS at 14:05

## 2021-10-20 NOTE — P.DS
Providers


Date of admission: 


10/12/21 10:46





Expected date of discharge: 10/20/21


Attending physician: 


Alfredo Dempsey MD





Consults: 





                                        





10/12/21 10:49


Consult Physician Routine 


   Consulting Provider: Thomas Almonte


   Consult Reason/Comments: renal calculus


   Do you want consulting provider notified?: Already Contacted


Consult Physician Urgent 


   Consulting Provider: Jhony Pavon


   Consult Reason/Comments: ARF


   Do you want consulting provider notified?: Already Contacted


Consult Physician Urgent 


   Consulting Provider: Иван Burgos


   Consult Reason/Comments: ARF


   Do you want consulting provider notified?: Already Contacted





10/14/21 08:55


Consult Physician Routine 


   Consulting Provider: Jeremiah Pandey


   Consult Reason/Comments: hemodialysis catheter insertion


   Do you want consulting provider notified?: Yes





10/19/21 09:55


Consult Physician Routine 


   Consulting Provider: Modesto Brennan


   Consult Reason/Comments: in pt rehab please


   Do you want consulting provider notified?: Yes











Primary care physician: 


Monica Myers MD





Hospital Course: 





Discharge Diagnosis:





Bilateral hydronephrosis





Obstructive nephrolithiasis





Post renal Acute kidney injury





Diabetes





Hypertension





Hyperlipidemia





Hospital Course: 





Patient is a 71-year-old male with a past medical history of hypertension, 

hyperlipidemia, type II non-insulin-dependent diabetes mellitus, and multiple 

recurrent kidney stones.  He presented to the hospital on 10/12/21 with a chief 

complaint of bilateral flank pain/back pain that progressively worsened over the

past week.  Patient was seen and fully evaluated and found to have acute 

pyelonephritis, acute renal failure and hyperkalemia.  Potassium was 6.7, bicarb

was 6, and creatinine was 11.6.  Patient underwent CT abdomen and pelvis which 

revealed bilateral hydronephrosis with obstructed stone on the left.  Patient 

was admitted under our services with consults to urology and nephrology.  Gibson 

catheter was placed and patient underwent stat nephroureteral stent placement.  

Due to being severely acidotic, patient was placed on bicarb infusion and 

transferred to the ICU.  During stay patient also found to be hypotensive and 

required levophed infusion.  Patient underwent dialysis on 10/14/21, 10/15/21, 

and 10/16/21.  Acidosis improved, bicarb infusion completed.  Vital signs 

improved, vasopressors discontinued.  Over 8 day hospitalization period, 

patient's condition improving.  He completed course of antibiotics with 

ceftriaxone.  Urine culture was negative and blood culture revealed no growth 

after 144 hours.  Gibson catheter being removed as advised by urology.  Patient 

underwent evaluation by PT/OT.  Patient is being discharged to [facility] for continued rehabilitation.  Patient to follow-up with PCP, 

nephrology, and urology.  Urologist stated they will arrange for outpatient 

bilateral ureterocopies used to address renal stones and recommended 

discontinuation of Gibson catheter prior to discharge.  Lisinopril, lovastatin, 

metformin, Toradol, and Ozempic discontinued at this time and can be further 

addressed upon follow up with PCP and nephrology. Pt started on sliding scale 

along with long acting levemir and amlodopine.  Patient stable for discharge to 

SNF at this time.  Repeat BMP to be completed in 3 days.





Physical examination:





Patient seen and examined at bedside.  Patient sitting up at bedside.  He denies

having any complaints or concerns at this time.  He just completed therapy with 

PT/OT.  He continued to report mild difficulties with stairs.  Denies having any

further complaints at this time.  Gibson catheter being removed and voiding trial

to be completed prior to discharge.  Patient medically stable for discharge at 

this time.  Renal function continues to improve.  Patient to follow up 

outpatient with nephrology and urology and PCP.  Repeat BMP to be completed in 3

days with results to be sent to PCP, nephrology, and urology for follow-up.





Vital signs reviewed and stable. 


General: Nontoxic, no distress and appears stated age.  


Derm: Skin warm and dry, normal coloration for ethnicity.


Head: Atraumatic, normocephalic and symmetric.  


Eyes: EOMs intact, no lid lag, and anicteric sclera


Mouth: no lip lesions, mucus membranes moist


Cardiovascular: regular rate and rhythm with normal S1S2, no murmur, positive 

posterior tibial pulses bilaterally, and cap refill < 2 seconds.  


Lungs: Respirations even, regular, and unlabored on room air. Lungs CTA bilatera

lly, no rhonchi, no rales, no wheezing, and no accessory muscle usage. 


Abdominal: soft, nontender to palpation, no guarding, no appreciable 

organomegaly


Ext: ROM intact. No gross muscle atrophy, no edema, no contractures


Neuro: Speech clear, face symmetrical and CN II-XII grossly intact with no noted

focal neuro deficits


Psych: Alert and oriented to person, place, time, and situation. Appropriate and

pleasant affect. 








A total of 45 minutes of time were spent preparing this complex discharge 

summary.








Patient Condition at Discharge: Stable





Plan - Discharge Summary


Discharge Rx Participant: Yes


New Discharge Prescriptions: 


New


   Insulin Detemir (Levemir) [Levemir] 10 unit SQ HS  ml


   INSULIN ASPART (NovoLOG) [NovoLOG (formulary)] 0 unit SQ AC-TID  ml


   amLODIPine [Norvasc] 5 mg PO DAILY 30 Days #30 tab


   Acetaminophen Tab [Tylenol] 650 mg PO Q6HR PRN  tab


     PRN Reason: Mild Pain Or Fever > 100.5





Continue


   Escitalopram Oxalate [Lexapro] 30 mg PO DAILY


   Multivitamins, Thera [Multivitamin (formulary)] 1 tab PO DAILY


   Gabapentin [Neurontin] 400 mg PO TID


   Ondansetron Odt [Zofran ODT] 4 mg PO Q8HR PRN #10 tab


     PRN Reason: Nausea


   Tamsulosin [Flomax] 0.4 mg PO DAILY #7 cap





Discontinued


   lisinopriL [Zestril] 10 mg PO QAM


   Lovastatin [Mevacor] 40 mg PO DAILY


   metFORMIN HCL ER [Glucophage XR] 1,000 mg PO BID


   Ketorolac [Toradol] 10 mg PO Q8HR #15 tab


   Semaglutide [Ozempic] 0.5 mg SQ Q7D


Discharge Medication List





Escitalopram Oxalate [Lexapro] 30 mg PO DAILY 01/23/19 [History]


Multivitamins, Thera [Multivitamin (formulary)] 1 tab PO DAILY 01/23/19 

[History]


Gabapentin [Neurontin] 400 mg PO TID 10/16/20 [History]


Ondansetron Odt [Zofran ODT] 4 mg PO Q8HR PRN #10 tab 10/05/21 [Rx]


Tamsulosin [Flomax] 0.4 mg PO DAILY #7 cap 10/05/21 [Rx]


Acetaminophen Tab [Tylenol] 650 mg PO Q6HR PRN  tab 10/20/21 [Rx]


INSULIN ASPART (NovoLOG) [NovoLOG (formulary)] 0 unit SQ AC-TID  ml 10/20/21 

[Rx]


Insulin Detemir (Levemir) [Levemir] 10 unit SQ HS  ml 10/20/21 [Rx]


amLODIPine [Norvasc] 5 mg PO DAILY 30 Days #30 tab 10/20/21 [Rx]








Follow up Appointment(s)/Referral(s): 


Elise Gibson MD [STAFF PHYSICIAN] - 1 Week


Thomas Almonte MD [STAFF PHYSICIAN] - 1 Week


Monica Myers MD [Primary Care Provider] - 1-2 days


Discharge Disposition: TRANSFER TO SNF/ECF

## 2021-10-20 NOTE — PN
PROGRESS NOTE



Patient is seen for followup for acute kidney injury.  He had dialysis on initial

admission.  However, dialysis has been on hold, as renal function has been improving.

Serum creatinine was at 3.4 for the last couple of days.  Patient continues to have

good urine output.  If his renal function is further improved, the dialysis catheter

can be removed.  Labs are pending at the time when patient was seen.



On examination this morning, blood pressure was 160/85, heart rate 62 per minute.

Patient is afebrile.

EXAMINATION OF THE HEART: S1 and S2.

EXAMINATION OF LUNGS: Bilateral breath sounds are heard.

Abdomen is soft, non-tender.

Examination of lower extremities shows edema 1+ bilaterally.

CNS EXAM: Grossly intact.



Labs are pending from today.



ASSESSMENT:

1. Acute kidney injury, acute tubular necrosis and obstructive uropathy.  His renal

    function seems to be improving.  Labs are pending from today.  If renal function

    continues to improve, patient's dialysis catheter will need to be discontinued and

    he could be discharged with plans for close followup with Nephrology and Urology

    post discharge.

2. Bilateral hydronephrosis with bilateral ureteral calculi, status post bilateral

    ureteral stent placement. To follow up with Urology.

3. Metabolic acidosis associated with renal failure, possibly related to metformin as

    well, currently resolved.

4. Hyperkalemia associated with acute kidney injury, obstructive uropathy, now

    improved.

5. Hyperphosphatemia, improving with improvement in renal function. Discontinue

    PhosLo.



PLAN:

Follow up on labs from today.  If renal function continues to improve, dialysis

catheter will need to be discontinued and patient could be discharged with plans for

close followup as outpatient.





MMODL / IJN: [id number] / Job#: [id number]

## 2021-10-21 NOTE — P.PN
Progress Note - Text


Progress Note Date: 10/21/21





Pt was discharged prior to removal of dialysis catheter. Called and spoke with 

patient's wife, Mague Salmon at 11:49 a.m this morning, She was notified that 

this is very important to have removed at office by Dr. Pandey. Pt's wife 

stated that she will have her , (Mr. Salmon) to the nephrologist office 

today to have dialysis catheter removed.

## 2021-11-05 ENCOUNTER — HOSPITAL ENCOUNTER (OUTPATIENT)
Dept: HOSPITAL 47 - OR | Age: 71
Discharge: HOME | End: 2021-11-05
Attending: UROLOGY
Payer: MEDICARE

## 2021-11-05 VITALS — RESPIRATION RATE: 24 BRPM | SYSTOLIC BLOOD PRESSURE: 146 MMHG | DIASTOLIC BLOOD PRESSURE: 80 MMHG | HEART RATE: 62 BPM

## 2021-11-05 VITALS — TEMPERATURE: 97 F

## 2021-11-05 DIAGNOSIS — Z79.899: ICD-10-CM

## 2021-11-05 DIAGNOSIS — E78.5: ICD-10-CM

## 2021-11-05 DIAGNOSIS — J45.909: ICD-10-CM

## 2021-11-05 DIAGNOSIS — Z98.890: ICD-10-CM

## 2021-11-05 DIAGNOSIS — E11.22: ICD-10-CM

## 2021-11-05 DIAGNOSIS — K42.9: ICD-10-CM

## 2021-11-05 DIAGNOSIS — N20.2: Primary | ICD-10-CM

## 2021-11-05 DIAGNOSIS — N18.9: ICD-10-CM

## 2021-11-05 DIAGNOSIS — Z87.442: ICD-10-CM

## 2021-11-05 DIAGNOSIS — Z86.19: ICD-10-CM

## 2021-11-05 DIAGNOSIS — Z82.49: ICD-10-CM

## 2021-11-05 DIAGNOSIS — R01.1: ICD-10-CM

## 2021-11-05 DIAGNOSIS — Z79.4: ICD-10-CM

## 2021-11-05 DIAGNOSIS — L98.9: ICD-10-CM

## 2021-11-05 DIAGNOSIS — I12.9: ICD-10-CM

## 2021-11-05 LAB
GLUCOSE BLD-MCNC: 129 MG/DL (ref 75–99)
GLUCOSE BLD-MCNC: 162 MG/DL (ref 75–99)

## 2021-11-05 PROCEDURE — 84132 ASSAY OF SERUM POTASSIUM: CPT

## 2021-11-05 PROCEDURE — 52356 CYSTO/URETERO W/LITHOTRIPSY: CPT

## 2021-11-05 RX ADMIN — POTASSIUM CHLORIDE SCH MLS: 14.9 INJECTION, SOLUTION INTRAVENOUS at 12:36

## 2021-11-05 RX ADMIN — POTASSIUM CHLORIDE SCH MLS: 14.9 INJECTION, SOLUTION INTRAVENOUS at 12:17

## 2021-11-05 NOTE — P.HPIHPCON
History of Present Illness


H&P Date: 11/05/21


Chief Complaint: Bilateral renal stones





This is a 71-year-old male admitted to the hospital with renal failure, 

bilateral hydronephrosis on 10/12, CT of that time showed evidence of a left 

distal ureteral stone, recently passed right ureteral stone.  He underwent 

bilateral stent placement on October 12.  He presents today for definitive stone

management.  Of note the CT showed bilateral renal stones in addition  to the 

left sided distal ureteral stone.  Discussed with him the option of doing a 

bilateral ureteroscopy with holmium laser.  Discussed the risk which includes 

but not limited to bleeding, infection, injury to ureter.  Discussed also with 

him risk from anesthesia.  He understood all the risk and agreed to proceed


Consent for Procedure: 


I have explained the operation/procedure to the patient, including the risks, 

benefits, side effects, alternative therapies (including not receiving the 

proposed treatment or service), the likelihood of the patient achieving his/her 

goals, and potential recuperation problems for the procedure/sedation/analgesia,

as well as any blood products, if indicated. I also explained to the patient the

risks, benefits and side effects of the alternatives, as well as the risks 

related to not receiving the proposed procedure, care, treatment, or services.








- Constitutional


Constitutional: Denies chills, Denies fever





- Cardiovascular


Cardiovascular: Denies chest pain, Denies shortness of breath





- Respiratory


Respiratory: Denies cough, Denies 7





- Gastrointestinal


Gastrointestinal: Denies abdominal pain, Denies diarrhea, Denies nausea, Denies 

vomiting





Past Medical History


Past Medical History: Asthma, Diabetes Mellitus, Hyperlipidemia, Hypertension, 

Skin Disorder


Additional Past Medical History / Comment(s): hx heart murmer, kidney stones, 

umbilical hernia,


History of Any Multi-Drug Resistant Organisms: None Reported


Past Surgical History: Orthopedic Surgery


Additional Past Surgical History / Comment(s): rt knee surgery, mole removed 

from rt upper chest, rt shoulder-rotator cuff, kidney stones blasted multiple 

times


Past Anesthesia/Blood Transfusion Reactions: No Reported Reaction


Past Psychological History: Depression


Smoking Status: Never smoker


Past Alcohol Use History: None Reported


Past Drug Use History: None Reported





- Past Family History


  ** Sister(s)


Family Medical History: Deep Vein Thrombosis (DVT)





Medications and Allergies


                                Home Medications











 Medication  Instructions  Recorded  Confirmed  Type


 


Escitalopram Oxalate [Lexapro] 30 mg PO QAM 01/23/19 11/05/21 History


 


Multivitamins, Thera [Multivitamin 1 tab PO DAILY 01/23/19 11/02/21 History





(formulary)]    


 


Gabapentin [Neurontin] 400 mg PO Q8H 10/16/20 11/05/21 History


 


Ondansetron Odt [Zofran ODT] 4 mg PO Q8HR PRN #10 tab 10/05/21 11/02/21 Rx


 


Acetaminophen Tab [Tylenol] 650 mg PO Q6HR PRN  tab 10/20/21 11/02/21 Rx


 


INSULIN ASPART (NovoLOG) [NovoLOG 0 unit SQ AC-TID  ml 10/20/21 11/05/21 Rx





(formulary)]    


 


Insulin Detemir (Levemir) [Levemir] 10 unit SQ HS  ml 10/20/21 11/05/21 Rx


 


Benzocaine/Menthol Lozeng [Cepacol 1 lozenge PO AS DIRECTED PRN 11/02/21 11/05/21 History





lozenge]    


 


Tamsulosin [Flomax] 0.4 mg PO QAM 11/02/21 11/05/21 History


 


amLODIPine [Norvasc] 5 mg PO QAM 11/02/21 11/05/21 History








                                    Allergies











Allergy/AdvReac Type Severity Reaction Status Date / Time


 


No Known Allergies Allergy   Verified 11/05/21 12:18














Surgical - Exam


                                   Vital Signs











Temp Pulse Resp BP Pulse Ox


 


 96.7 F L  65   18   159/75   98 


 


 11/05/21 12:21  11/05/21 12:21  11/05/21 12:21  11/05/21 12:21  11/05/21 12:21














- General


no distress, no pain





- Eyes


PERRL, normal ocular movement





- Respiratory


normal expansion, normal respiratory effort





- Abdomen


Abdomen: soft, non tender





Results





- Labs





                                 11/05/21 12:39


                  Abnormal Lab Results - Last 24 Hours (Table)











  11/05/21 Range/Units





  12:42 


 


POC Glucose (mg/dL)  162 H  (75-99)  mg/dL








                                 Diabetes panel











  11/05/21 Range/Units





  12:39 


 


Potassium  4.5  (3.5-5.1)  mmol/L








                                 Pituitary panel











  11/05/21 Range/Units





  12:39 


 


Potassium  4.5  (3.5-5.1)  mmol/L








                                  Adrenal panel











  11/05/21 Range/Units





  12:39 


 


Potassium  4.5  (3.5-5.1)  mmol/L














Assessment and Plan


Assessment: 





OR for bilateral ureteroscopy's, with holmium laser lithotripsy, stone basketing

 and stent removal versus exchange

## 2021-11-05 NOTE — FL
Fluoroscopy

 

HISTORY: Pain

 

17 seconds fluoroscopy time supplied to the referring clinician.  2 intraoperative C-arm images docum
ent the procedure. See dictated report from urology.

## 2021-11-05 NOTE — P.OP
Date of Procedure: 11/05/21


Preoperative Diagnosis: 


Bilateral renal stones, left ureteral stone


Postoperative Diagnosis: 


Bilateral renal stone


Procedure(s) Performed: 


Cystoscopy, bilateral ureteroscopy, holmium laser lithotripsy and stent removal


Implants: 


None


Anesthesia: MORA


Surgeon: Thomas Almonte


Estimated Blood Loss (ml): 1


Pathology: none sent


Condition: stable


Disposition: PACU


Indications for Procedure: 


This is a 71-year-old male admitted to the hospital with renal failure, 

bilateral hydronephrosis on 10/12, CT of that time showed evidence of a left 

distal ureteral stone, recently passed right ureteral stone.  He underwent 

bilateral stent placement on October 12.  He presents today for definitive stone

management.  Of note the CT showed bilateral renal stones in addition  to the 

left sided distal ureteral stone.  Discussed with him the option of doing a 

bilateral ureteroscopy with holmium laser.  Discussed the risk which includes 

but not limited to bleeding, infection, injury to ureter.  Discussed also with 

him risk from anesthesia.  He understood all the risk and agreed to proceed


Operative Findings: 


Multiple bilateral small renal stones


Description of Procedure: 


Patient was brought to the operating room, general anesthesia was induced.  He 

was prepped and draped in sterile fashion and  placed in dorsal lithotomy 

position.  A cystoscopy fitted with a 21-Burmese sheath was inserted per urethra,

cystoscopy was performed showed no abnormality within the bladder.  Attention 

was then carried to the left ureteral orifice, the stent was grasped and removed

to the meatus.  Next a sensor wire was advanced through the stent and the stent 

was removed with the wire in place.  Attention was then carried to the right 

ureteral stent, the stent was grasped and removed to the meatus, a sensor wire 

was advanced through the stent.  Next a semirigid ureteroscope was inserted per 

urethra and advanced up the left ureteral orifice.  The scope was advanced all 

the way up to the UPJ which showed no evidence of stones, pullback ureteroscopy 

was performed showed no injury to the ureter or any ureteral stone.  Attention 

was then carried to the right ureteral orifice, the semirigid ureteroscope was 

advanced up the right ureteral orifice, no stones were visualized along the 

course of the ureter.  Pullback ureteroscopy was performed which showed no 

injury to ureter or any ureteral stones.  Next an 1113 Burmese access sheath was

passed over the wire and into the proximal ureter.  The flexible ureteroscope 

was inserted through the access sheath, renoscopy was performed showed multiple 

small renal stones.  Stones were dusted using the holmium laser.  Repeat 

renoscopy showed no sizable stone or injury to the kidney.  Pullback 

ureteroscopy was performed which showed no injury to the ureter or any ureteral 

stone.  At this time the access sheath was advanced up the right ureter and into

the proximal ureter over the wire.  Next the flexible ureteroscope was inserted 

through the access sheath, renoscopy was performed showed multiple small stones 

within the lower pole of the right kidney.  The stones were dusted using the 

holmium laser.  Repeat renoscopy showed no sizable fragments or injury to the 

kidney.  Pullback ureteroscopy was performed showed no injury to the ureter or 

any ureteral fragments.  There was no ureteral edema on either side, thus a 

stent was not placed.  The bladder was emptied at the end of the case.  Patient 

tolerated the procedure well was taken to recovery in stable condition

## 2022-08-17 ENCOUNTER — HOSPITAL ENCOUNTER (OUTPATIENT)
Dept: HOSPITAL 47 - RADXRMAIN | Age: 72
Discharge: HOME | End: 2022-08-17
Attending: INTERNAL MEDICINE
Payer: MEDICARE

## 2022-08-17 DIAGNOSIS — M47.816: Primary | ICD-10-CM

## 2022-08-17 DIAGNOSIS — M51.37: ICD-10-CM

## 2022-08-17 PROCEDURE — 72100 X-RAY EXAM L-S SPINE 2/3 VWS: CPT

## 2022-08-17 NOTE — XR
EXAM TYPE: LUMBAR SPINE X RAY SERIES

 

COMPARISON: NONE

 

HISTORY: Pain

 

TECHNIQUE: 4 views are submitted.

 

FINDINGS:

Alignment is anatomic.  The pedicles are intact.  The transverse processes are intact.  There is exte
nsive vascular calcifications. Severe facet arthropathy lower lumbar spine with multilevel hypertroph
ic changes. Severe degenerative disc disease L5-S1 with moderate changes at L4-L5. Mild diffuse osteo
penia.

 

IMPRESSION:

1. Multilevel degenerative disc disease and facet arthropathy. Most marked L4-5 and L5-S1 suspected b
ilateral foraminal encroachment.

## 2022-11-18 ENCOUNTER — HOSPITAL ENCOUNTER (OUTPATIENT)
Dept: HOSPITAL 47 - RADUSWWP | Age: 72
Discharge: HOME | End: 2022-11-18
Attending: UROLOGY
Payer: MEDICARE

## 2022-11-18 DIAGNOSIS — N28.1: ICD-10-CM

## 2022-11-18 DIAGNOSIS — N20.0: Primary | ICD-10-CM

## 2022-11-18 PROCEDURE — 76770 US EXAM ABDO BACK WALL COMP: CPT

## 2022-11-18 NOTE — US
EXAMINATION TYPE: US kidneys/renal and bladder

 

DATE OF EXAM: 11/18/2022

 

COMPARISON: CT 10/12/2021, ULS 09/24/2020

 

CLINICAL HISTORY: N20.0 CALCULUS OF KIDNEY.

 

EXAM MEASUREMENTS:

 

Right Kidney:  10.0 x 5.2 x 4.8 cm

Left Kidney: 10.6 x 4.7 x 5.4 cm

 

 

 

Right Kidney: At the inferior pole and echogenic foci with shadowing and ring down artifact is visual
ized, measuring 7mm.  

Left Kidney: At the inferior pole and exophytic anechoic cyst is visualized, measuring 3.4 x 2.7 x 4.
0 cm. 

Bladder: wnl

**Bilateral Jets seen: Left visualized, right not visualized

 

There is no evidence for hydronephrosis at this point in time.   

 

 

 

IMPRESSION:

1.  Nonobstructive right renal calculus.

2.  Left renal cyst.

## 2022-12-17 ENCOUNTER — HOSPITAL ENCOUNTER (OUTPATIENT)
Dept: HOSPITAL 47 - LABPAT | Age: 72
Discharge: HOME | End: 2022-12-17
Attending: UROLOGY
Payer: MEDICARE

## 2022-12-17 DIAGNOSIS — R31.29: ICD-10-CM

## 2022-12-17 DIAGNOSIS — Z01.812: Primary | ICD-10-CM

## 2022-12-17 DIAGNOSIS — N20.0: ICD-10-CM

## 2022-12-17 LAB
ANION GAP SERPL CALC-SCNC: 11.5 MMOL/L (ref 10–18)
BASOPHILS # BLD AUTO: 0.07 X 10*3/UL (ref 0–0.1)
BASOPHILS NFR BLD AUTO: 0.9 %
BUN SERPL-SCNC: 22.3 MG/DL (ref 9–27)
BUN/CREAT SERPL: 11.15 RATIO (ref 12–20)
CALCIUM SPEC-MCNC: 9.6 MG/DL (ref 8.7–10.3)
CHLORIDE SERPL-SCNC: 100 MMOL/L (ref 96–109)
CO2 SERPL-SCNC: 26.5 MMOL/L (ref 20–27.5)
EOSINOPHIL # BLD AUTO: 0.35 X 10*3/UL (ref 0.04–0.35)
EOSINOPHIL NFR BLD AUTO: 4.3 %
ERYTHROCYTE [DISTWIDTH] IN BLOOD BY AUTOMATED COUNT: 4.4 X 10*6/UL (ref 4.4–5.6)
ERYTHROCYTE [DISTWIDTH] IN BLOOD: 13.1 % (ref 11.5–14.5)
GLUCOSE SERPL-MCNC: 300 MG/DL (ref 70–110)
GLUCOSE UR QL: (no result)
HCT VFR BLD AUTO: 40.2 % (ref 39.6–50)
HGB BLD-MCNC: 13 G/DL (ref 13–17)
HYALINE CASTS UR QL AUTO: 1 /LPF (ref 0–2)
IMM GRANULOCYTES BLD QL AUTO: 0.4 %
LYMPHOCYTES # SPEC AUTO: 1.42 X 10*3/UL (ref 0.9–5)
LYMPHOCYTES NFR SPEC AUTO: 17.4 %
MCH RBC QN AUTO: 29.5 PG (ref 27–32)
MCHC RBC AUTO-ENTMCNC: 32.3 G/DL (ref 32–37)
MCV RBC AUTO: 91.4 FL (ref 80–97)
MONOCYTES # BLD AUTO: 0.62 X 10*3/UL (ref 0.2–1)
MONOCYTES NFR BLD AUTO: 7.6 %
NEUTROPHILS # BLD AUTO: 5.66 X 10*3/UL (ref 1.8–7.7)
NEUTROPHILS NFR BLD AUTO: 69.4 %
NRBC BLD AUTO-RTO: 0 /100 WBCS (ref 0–0)
PH UR: 5.5 [PH] (ref 5–8)
PLATELET # BLD AUTO: 223 X 10*3/UL (ref 140–440)
POTASSIUM SERPL-SCNC: 4.2 MMOL/L (ref 3.5–5.5)
PROT UR QL: (no result)
RBC UR QL: 1 /HPF (ref 0–5)
SODIUM SERPL-SCNC: 138 MMOL/L (ref 135–145)
SP GR UR: 1.02 (ref 1–1.03)
SQUAMOUS UR QL AUTO: <1 /HPF (ref 0–4)
UROBILINOGEN UR QL STRIP: <2 MG/DL (ref ?–2)
WBC # BLD AUTO: 8.15 X 10*3/UL (ref 4.5–10)
WBC #/AREA URNS HPF: 1 /HPF (ref 0–5)

## 2022-12-17 PROCEDURE — 85025 COMPLETE CBC W/AUTO DIFF WBC: CPT

## 2022-12-17 PROCEDURE — 87086 URINE CULTURE/COLONY COUNT: CPT

## 2022-12-17 PROCEDURE — 80048 BASIC METABOLIC PNL TOTAL CA: CPT

## 2022-12-17 PROCEDURE — 81001 URINALYSIS AUTO W/SCOPE: CPT

## 2022-12-26 NOTE — P.HPIHPCON
History of Present Illness


H&P Date: 12/26/22


Chief Complaint: Right renal stone





This is a 72-year-old male with history of recurrent uric acid stones.  

Underwent renal ultrasound that showed evidence of a 7 mm right-sided renal 

stone.  He is symptomatically from his stone.  Option of observation versus 

surgical removal was discussed with him.  He agreed to proceed with right-sided 

ureteroscopy with holmium laser. The risk which are not limited to bleeding, 

infection, injury to ureter.  Discussed also the potential of persistent pain 

even with stone removal, as the pain might not be  of renal etiology


Consent for Procedure: 


I have explained the operation/procedure to the patient, including the risks, 

benefits, side effects, alternative therapies (including not receiving the 

proposed treatment or service), the likelihood of the patient achieving his/her 

goals, and potential recuperation problems for the procedure/sedation/analgesia,

as well as any blood products, if indicated. I also explained to the patient the

risks, benefits and side effects of the alternatives, as well as the risks 

related to not receiving the proposed procedure, care, treatment, or services.








Past Medical History


Past Medical History: Asthma, Diabetes Mellitus, Hyperlipidemia, Hypertension, 

Renal Disease


Additional Past Medical History / Comment(s): hx heart murmer, multiple kidney 

stones, hx renal failure., herniated discs with back pain.


History of Any Multi-Drug Resistant Organisms: None Reported


Past Surgical History: Orthopedic Surgery


Additional Past Surgical History / Comment(s): rt knee surgery, mole removed 

from rt upper chest, rt shoulder-rotator cuff,  lithotripsy  multiple times


Past Anesthesia/Blood Transfusion Reactions: No Reported Reaction, Motion 

Sickness


Additional Past Anesthesia/Blood Transfusion Reaction / Comment(s): difficulty 

waking up


Past Psychological History: Depression


Smoking Status: Never smoker


Past Alcohol Use History: Occasional


Past Drug Use History: None Reported


Additional Drug Use History / Comment(s): occasional cbd gummy





- Past Family History


  ** Sister(s)


Family Medical History: Deep Vein Thrombosis (DVT)





Medications and Allergies


                                Home Medications











 Medication  Instructions  Recorded  Confirmed  Type


 


Escitalopram Oxalate [Lexapro] 20 mg PO QAM 01/23/19 12/23/22 History


 


Gabapentin [Neurontin] 400 mg PO BID 10/16/20 12/23/22 History


 


Albuterol Inhaler [Ventolin Hfa 1 puff INHALATION AS DIRECTED PRN 12/23/22 12/23/22 History





Inhaler]    


 


Cbd Gummy 1 dose PO AS DIRECTED 12/23/22  History


 


INSULIN LISPRO (humaLOG) [humaLOG] 15 units SQ BID-W/MEALS 12/23/22 12/23/22 

History


 


INSULIN LISPRO (humaLOG) [humaLOG] 18 units SQ AC-LUNCH 12/23/22 12/23/22 

History


 


Insulin Detemir (Levemir) [Levemir] 50 unit SQ QAM 12/23/22 12/23/22 History


 


Lovastatin [Mevacor] 40 mg PO DAILY 12/23/22 12/23/22 History


 


Midodrine [ProAmatine] 5 mg PO DAILY 12/23/22 12/23/22 History


 


Mirabegron [Myrbetriq] 25 mg PO DAILY 12/23/22 12/23/22 History


 


lisinopriL [Zestril] 10 mg PO DAILY 12/23/22 12/23/22 History








                                    Allergies











Allergy/AdvReac Type Severity Reaction Status Date / Time


 


No Known Allergies Allergy   Verified 12/23/22 14:36

## 2022-12-28 ENCOUNTER — HOSPITAL ENCOUNTER (OUTPATIENT)
Dept: HOSPITAL 47 - OR | Age: 72
Discharge: HOME | End: 2022-12-28
Attending: UROLOGY
Payer: MEDICARE

## 2022-12-28 VITALS — RESPIRATION RATE: 20 BRPM | HEART RATE: 82 BPM

## 2022-12-28 VITALS — BODY MASS INDEX: 28.5 KG/M2

## 2022-12-28 VITALS — TEMPERATURE: 97.3 F

## 2022-12-28 VITALS — DIASTOLIC BLOOD PRESSURE: 75 MMHG | SYSTOLIC BLOOD PRESSURE: 152 MMHG

## 2022-12-28 DIAGNOSIS — N28.9: ICD-10-CM

## 2022-12-28 DIAGNOSIS — Z79.4: ICD-10-CM

## 2022-12-28 DIAGNOSIS — F10.90: ICD-10-CM

## 2022-12-28 DIAGNOSIS — Z79.899: ICD-10-CM

## 2022-12-28 DIAGNOSIS — N20.0: Primary | ICD-10-CM

## 2022-12-28 DIAGNOSIS — Z83.2: ICD-10-CM

## 2022-12-28 DIAGNOSIS — J45.909: ICD-10-CM

## 2022-12-28 DIAGNOSIS — Z79.51: ICD-10-CM

## 2022-12-28 DIAGNOSIS — Z98.890: ICD-10-CM

## 2022-12-28 DIAGNOSIS — F32.A: ICD-10-CM

## 2022-12-28 DIAGNOSIS — M54.9: ICD-10-CM

## 2022-12-28 DIAGNOSIS — I10: ICD-10-CM

## 2022-12-28 DIAGNOSIS — E78.5: ICD-10-CM

## 2022-12-28 DIAGNOSIS — E11.9: ICD-10-CM

## 2022-12-28 LAB
GLUCOSE BLD-MCNC: 158 MG/DL (ref 70–110)
GLUCOSE BLD-MCNC: 169 MG/DL (ref 70–110)

## 2022-12-28 PROCEDURE — 82365 CALCULUS SPECTROSCOPY: CPT

## 2022-12-28 PROCEDURE — 74018 RADEX ABDOMEN 1 VIEW: CPT

## 2022-12-28 PROCEDURE — 52353 CYSTOURETERO W/LITHOTRIPSY: CPT

## 2022-12-28 NOTE — FL
Intraoperative/procedural fluoroscopic services were provided for right renal calculus with Dr. Nichole mayorga. Total fluoroscopy time is 3 seconds with a total of 1 submitted image to PACS. Please see the oper
ative note for further details.

## 2022-12-28 NOTE — P.OP
Date of Procedure: 12/28/22


Preoperative Diagnosis: 


Right Renal stone


Postoperative Diagnosis: 


Same


Procedure(s) Performed: 


Cystoscopy, right ureteroscopy, holmium laser lithotripsy, stone basketing


Implants: 


None


Anesthesia: GETA


Surgeon: Thomas Almonte


Estimated Blood Loss (ml): 1


Pathology: other (right renal stone)


Condition: stable


Disposition: PACU


Indications for Procedure: 





This is a 72-year-old male with history of recurrent uric acid stones.  

Underwent renal ultrasound that showed evidence of a 7 mm right-sided renal 

stone.  He is symptomatically from his stone.  Option of observation versus 

surgical removal was discussed with him.  He agreed to proceed with right-sided 

ureteroscopy with holmium laser. The risk which are not limited to bleeding, 

infection, injury to ureter.  Discussed also the potential of persistent pain 

even with stone removal, as the pain might not be  of renal etiology


Operative Findings: 


right sided lower pole stone ,multiple small renal stone 


Description of Procedure: 


Patient brought to the operating room, general anesthesia was induced.  He was 

prepped and draped in sterile fashion and placed in dorsal lithotomy position.  

Cystoscopy fitted with a 21-Estonian sheath was inserted per urethra, cystoscopy 

was performed which  showed no abnormality within the bladder.  Of note patient 

did have an obstructive prostate but it was fairly short.  At this time 

attention was carried to the right ureteral orifice which was intubated with a 

sensor wire.  Next the scope was removed with the wire in place.  Next under 

fluoroscopy a 1113 Estonian access sheath was passed into the proximal ureter.  

Next the flexible ureteroscope was inserted through the access sheath, renoscopy

was performed which showed a stone within the lower pole.  Which was dusted.  

Sizable fragments were removed and sent to analysis.  There was multiple small 

stones throughout the kidney that were dusted.  Repeat renoscopy showed no 

sizable stones or injury to the kidney.  Pullback ureteroscopy was performed 

which showed no injury to the ureter or any ureteral stones.  The bladder was 

emptied at the end of the case.  Patient tolerated procedure well was taken to 

recovery in stable condition

## 2022-12-28 NOTE — XR
EXAMINATION TYPE: XR KUB

 

DATE OF EXAM: 12/28/2022 11:00 AM

 

INDICATION: 

Patient age:Male;  72 years old; 

Reason for study: lithotripsy with Dr Almonte 12/28/22; 

 

COMPARISON: CT 10/12/2021, KUB 10/19/2020.

 

TECHNIQUE: One radiographic view of the abdomen was obtained.

 

FINDINGS: Small 3 mm density near the right renal sinus. No left renal calculi identified. No left re
nal contour to liver identified. There is few pelvic probable phleboliths as seen on prior CTs. The b
owel gas pattern is nonspecific without dilated loops of small or large bowel. 

 

IMPRESSION:

Small 3 mm right renal calculus identified near the renal sinus. No additional calculus identified bi
laterally.

## 2023-04-12 NOTE — P.PN
Chief Complaint   Patient presents with   • Office Visit     rash       HISTORY OF PRESENT ILLNESS:     The patient is a 56 year old female who goes by the name Nereida.      The patient presents for evaluation regarding a RASH.    Location:  The rash started on the following part of the body:   scalp        The rash currently involves the following parts of the body:   scalp  Duration:  The rash was first noticed by the patient around the following time:  Since childhood-worse last 5-6 months  Quality:     The rash  itches   Severity:    The severity of the symptoms are mild on/off  Modifying Factors: The patient is currently treating the rash with OTC scalp oil, M-13 shampoo, T sal shampoo      Treatments previously tried include unknown prescription oil and tar shampoo as child.      REVIEW OF SYSTEMS:  Constitutional:  In general, the patient feels well:  Yes  Cardiovascular:  The patient has a pacemaker:  No  The patient has a defibrillator:  No  Hematologic:  The patient bleeds easily because of being on aspirin or an anticoagulant:  No    The patient is pregnant:         [] Yes   [x] No    [] Not applicable.  The patient is breastfeeding:  [] Yes   [x] No    [] Not applicable.    ALLERGIES:   Allergen Reactions   • Diclofenac Other (See Comments)     Patient should not receive due to hepatic disease  Patient should not receive due to hepatic disease   • Levofloxacin SEIZURES     seizure   • Budesonide DIARRHEA     Exacerbates cirrhosis symptoms   • Codeine PRURITUS   • Darvocet [Propoxyphene N-Apap]      itching   • Doxycycline + [id number] [Adoxa Angel] GI UPSET     Stomach contractions   • Erythromycin Base GI UPSET     Stomach contractions   • Levaquin Other (See Comments)     seizures   • Maprotiline Other (See Comments)   • Suture Angioedema     Certain type of sutures   • Tetracycline Base GI UPSET     Stomcah cramping     • Tylenol [Acetaminophen]      Doesn't take - is on Tegretol   • Unknown SWELLING      Subjective


Progress Note Date: 10/14/21





Patient is alert and oriented 4, groggy, slow to respond.  He does have 

asterixis on exam with increasing BUN, slowly recovering creatinine, increased 

urine output.  Plans for dialysis today secondary to uremic signs





Objective





- Vital Signs


Vital signs: 


                                   Vital Signs











Temp  98.5 F   10/14/21 08:00


 


Pulse  86   10/14/21 11:30


 


Resp  15   10/14/21 11:30


 


BP  124/60   10/14/21 11:30


 


Pulse Ox  96   10/14/21 11:30








                                 Intake & Output











 10/13/21 10/14/21 10/14/21





 18:59 06:59 18:59


 


Intake Total 2792.306 1947.521 356.862


 


Output Total 1320 1975 175


 


Balance 1472.306 -27.479 181.862


 


Weight  105.8 kg 


 


Intake:   


 


  IV 2040 690 20


 


    0.9 @20mls/hr 80  


 


    D5 at 20 160 240 20


 


    Dextrose 5% in Water 1, 1800 450 





    000 ml @ 150 mls/hr IV .   





    Q7H40M BOUCHRA with Sodium   





    Bicarb (1 Meq/ml) 150 ml   





    Rx#:[id number]   


 


  Intake, IV Titration 396.775 1144.521 336.862





  Amount   


 


    Magnesium Sulfate-D5w Pmx 100  





    1 gm In Dextrose/Water 1   





    100ml.bag @ 100 mls/hr   





    IVPB Q1H [facility] Rx#:   





    [id number]   


 


    Norepinephrine 8 mg In 112.306 132.521 211.862





    Sodium Chloride 0.9% 250   





    ml @ 0.05 MCG/KG/MIN 8.   





    996 mls/hr IV .Q24H [facility]   





    Rx#:[id number]   


 


    Sodium Chloride 0.45% 1,  1125 125





    000 ml @ 125 mls/hr IV .   





    Q8H [facility] Rx#:[id number]   


 


    cefTRIAXone 1 gm In 100  





    Sodium Chloride 0.9% 50   





    ml @ 100 mls/hr IVPB   





    Q24HR [facility] Rx#:[id number]   


 


  Oral 440  


 


Output:   


 


  Urine 1320 1975 175


 


Other:   


 


  Voiding Method Indwelling Catheter Indwelling Catheter 














- Exam





Gen: awake, alert and oriented 2


HEENT: normocephalic, atraumatic, good hearing acuity, moist mucous membranes


Resp: good air exchange, breathing comfortably with no accessory muscle use


CVS: good distal perfusion x 4,


GI: soft, NTTP, ND


: no SPT, bilateral CVAT, herndon catheter is present, grossly hemorrhagic


MSK: no pitting edema, no clubbing


Neuro: non-focal, moving all extremities


Psych: cooperative, euthymic mood 





- Labs


CBC & Chem 7: 


                                 10/14/21 04:33





                                 10/14/21 10:50


Labs: 


                  Abnormal Lab Results - Last 24 Hours (Table)











  10/13/21 10/13/21 10/13/21 Range/Units





  12:06 15:51 16:58 


 


RBC     (4.30-5.90)  m/uL


 


Hgb     (13.0-17.5)  gm/dL


 


Hct     (39.0-53.0)  %


 


Lymphocytes #     (1.0-4.8)  k/uL


 


Sodium  147 H    (137-145)  mmol/L


 


Chloride  95 L    ()  mmol/L


 


Carbon Dioxide     (22-30)  mmol/L


 


BUN  91 H    (9-20)  mg/dL


 


Creatinine  9.72 H*    (0.66-1.25)  mg/dL


 


Glucose  300 H    (74-99)  mg/dL


 


POC Glucose (mg/dL)    208 H  (75-99)  mg/dL


 


Plasma Lactic Acid Malick   7.4 H*   (0.7-2.0)  mmol/L


 


Calcium  6.9 L    (8.4-10.2)  mg/dL


 


Phosphorus     (2.5-4.5)  mg/dL














  10/13/21 10/14/21 10/14/21 Range/Units





  19:40 04:33 04:33 


 


RBC   3.05 L   (4.30-5.90)  m/uL


 


Hgb   10.0 L   (13.0-17.5)  gm/dL


 


Hct   28.9 L   (39.0-53.0)  %


 


Lymphocytes #   0.7 L   (1.0-4.8)  k/uL


 


Sodium  146 H   146 H  (137-145)  mmol/L


 


Chloride  94 L   94 L  ()  mmol/L


 


Carbon Dioxide    34 H  (22-30)  mmol/L


 


BUN  96 H   95 H  (9-20)  mg/dL


 


Creatinine  9.34 H*   9.16 H*  (0.66-1.25)  mg/dL


 


Glucose  174 H    (74-99)  mg/dL


 


POC Glucose (mg/dL)     (75-99)  mg/dL


 


Plasma Lactic Acid Malick     (0.7-2.0)  mmol/L


 


Calcium  6.7 L   6.6 L  (8.4-10.2)  mg/dL


 


Phosphorus    7.3 H  (2.5-4.5)  mg/dL














  10/14/21 10/14/21 10/14/21 Range/Units





  06:54 08:30 10:50 


 


RBC     (4.30-5.90)  m/uL


 


Hgb     (13.0-17.5)  gm/dL


 


Hct     (39.0-53.0)  %


 


Lymphocytes #     (1.0-4.8)  k/uL


 


Sodium     (137-145)  mmol/L


 


Chloride    93 L  ()  mmol/L


 


Carbon Dioxide    37 H  (22-30)  mmol/L


 


BUN    95 H  (9-20)  mg/dL


 


Creatinine    8.99 H*  (0.66-1.25)  mg/dL


 


Glucose    122 H  (74-99)  mg/dL


 


POC Glucose (mg/dL)  108 H  115 H   (75-99)  mg/dL


 


Plasma Lactic Acid Malick     (0.7-2.0)  mmol/L


 


Calcium    6.2 L*  (8.4-10.2)  mg/dL


 


Phosphorus     (2.5-4.5)  mg/dL








                      Microbiology - Last 24 Hours (Table)











 10/12/21 13:57 Gram Stain - Preliminary





 Aspirate Body Fluid Culture - Preliminary


 


 10/12/21 10:35 Urine Culture - Final





 Urine,Clean Catch 


 


 10/12/21 18:40 Blood Culture - Preliminary





 Blood    No Growth after 24 hours














Assessment and Plan


Assessment: 





Bilateral hydronephrosis


Obstructive nephrolithiasis


Post renal Acute kidney injury


-Admit inpatient, ICU with Pulm consult


-Nephrology consult; hemodialysis today


-Urology consult stat for nephroureteral stent completed on 10/12


-Pain control: Dilaudid when necessary with PCA


-Stool softener


-Nausea control


-IV fluids, hourly urine output


-Bicarb drip discontinued;


-Started on calcium acetate








Diabetes


Hypertension


Hyperlipidemia


-Home medications reviewed and reconciled 





Patient is full code


DVT prophylaxis with heparin 3 times a day Certain type of sutures       Current Outpatient Medications   Medication Sig   • lactulose (CHRONULAC) 10 GM/15ML solution Take 10 g by mouth.   • amoxicillin-clavulanate (Augmentin) 875-125 MG per tablet Take 1 tablet by mouth in the morning and 1 tablet in the evening. Do all this for 10 days.   • Spacer/Aero-Hold Chamber Bags Misc Spacer; use with inhaler   • LORazepam (ATIVAN) 1 MG tablet Take 1 tablet by mouth 2 times daily as needed for Anxiety.   • cetirizine (ZyrTEC) 10 MG tablet Take 1 tablet by mouth daily.   • levothyroxine 50 MCG tablet Take 1 tablet by mouth daily.   • butalbital-APAP-caffeine (FIORICET) -40 MG per tablet Take 1 tablet by mouth every 8 hours as needed for Headaches.   • nystatin (MYCOSTATIN) 080904 UNIT/ML suspension Take 1 mL by mouth 4 times daily. Use 1 mL by mouth 4 times daily until clear   • albuterol 108 (90 Base) MCG/ACT inhaler Inhale 2 puffs into the lungs every 4 hours as needed for Shortness of Breath or Wheezing.   • Asmanex, 120 Metered Doses, 220 MCG/ACT inhaler INHALE 2 PUFFS INTO THE LUNGS DAILY   • Lancets (freestyle) Misc Use to check blood sugar twice daily   • blood glucose (FREESTYLE LITE) test strip Test blood sugar 2 times daily. Diagnosis: diabetes, freestyle lite   • furosemide (LASIX) 40 MG tablet Take 1 tablet by mouth daily. (Patient taking differently: Take 40 mg by mouth daily. One daily)   • guaiFENesin (MUCINEX) 600 MG 12 hr tablet Take 1,200 mg by mouth 2 times daily as needed for Congestion.   • gabapentin (NEURONTIN) 300 MG capsule Take 2 capsules by mouth 4 times daily.   • meclizine (ANTIVERT) 25 MG tablet Take 1 tablet by mouth 3 times daily as needed for Dizziness.   • nicotine polacrilex (NICORETTE) 4 MG gum Take 1 each by mouth as needed for Smoking cessation.   • ondansetron (ZOFRAN ODT) 4 MG disintegrating tablet Place 1 tablet onto the tongue every 8 hours as needed for Nausea.   • clindamycin (CLINDAGEL) 1 % gel Apply each night to  the acne areas of the face   • benzoyl peroxide 5 % gel Apply each night to acne areas of face before going to bed.   • Ventolin  (90 Base) MCG/ACT inhaler INHALE 2 PUFFS INTO THE LUNGS EVERY 4 HOURS AS NEEDED FOR SHORTNESS OF BREATH OR WHEEZING   • pantoprazole (PROTONIX) 40 MG tablet TAKE 1 TABLET BY MOUTH DAILY   • fluticasone propionate (Flovent HFA) 110 MCG/ACT inhaler Inhale 1 puff into the lungs 2 times daily.   • fluticasone (FLONASE) 50 MCG/ACT nasal spray SHAKE LIQUID AND USE 2 SPRAYS IN EACH NOSTRIL EVERY NIGHT   • linaCLOtide (LINZESS PO) Take 1 tablet by mouth daily.   • rifAXIMin (XIFAXAN) 550 MG Tab Take 1 tablet by mouth 2 times daily.   • hydrOXYzine (ATARAX) 25 MG tablet TAKE 1 TABLET BY MOUTH FOUR TIMES DAILY AS NEEDED FOR ITCHING   • B Complex Vitamins (B-Complex/B-12) Tab TAKE 1 TABLET BY MOUTH DAILY   • Specialty Vitamins Products (MG-PLUS PROTEIN PO) Take 1 tablet by mouth daily. Protein 266 mg   • naLOXone (NARCAN) 4 MG/0.1ML nasal spray Call 911. [employer] the content of 1 device into 1 nostril. May repeat with 2nd device in alternate nostril if no response in 2-3 minutes.   • Alcohol Swabs (ALCOHOL WIPES) 70 % Pads 2 times daily.   • Cholecalciferol (VITAMIN D) 50 mcg (2,000 units) capsule Take 5,000 Units by mouth daily.    • clindamycin-benzoyl peroxide 1.2-5 % gel Apply each night to acne areas of face   • spironolactone (ALDACTONE) 50 MG tablet TAKE 3 TABLETS BY MOUTH DAILY   • glucose monitoring kit (FREESTYLE) monitoring kit 1 each by Other route 2 times daily.   • calcium carbonate-vitamin D (CALTRATE+D) 600-400 MG-UNIT per tablet Take 1 tablet by mouth 2 times daily (with meals). (Patient taking differently: Take 1 tablet by mouth daily.)   • Prenatal Vit-DSS-Fe Cbn-FA (PRENATAL MULTIVITAMIN-ULTRA) Tab Take 1 tablet by mouth daily.   • biotin 1000 MCG tablet Take 5,000 mcg by mouth daily.   • GENERLAC 10 GM/15ML solution TAKE 30 ML BY MOUTH TWICE DAILY   • lactulose (CHRONULAC)  10 GM/15ML solution Take 15 mLs by mouth daily as needed (constipation.). goal 1-2 BM every day.   • VITAMIN C 500 MG PO TABS 1 TABLET TWICE DAILY     No current facility-administered medications for this visit.       PAST MEDICAL HISTORY:      Migraine without aura, with intractable migrai*               Localization-related (focal) (partial) epileps* 1994          Headache(784.0)                                                 Comment: [facility]    History of alcohol abuse                        04/29/2015      Comment: Last drink 7/2015     Anxiety and depression                          04/29/2015    Gastritis                                                     GERD (gastroesophageal reflux disease)                        Tachycardia                                                   Alcoholic cirrhosis of liver without ascites (* 09/30/2019    Asterixis                                       09/30/2019    PONV (postoperative nausea and vomiting)                      Essential (primary) hypertension                                Comment: Portal    Microscopic colitis                             01/08/2023      Comment: per pt    Psoriasis                                       01/08/2023      Comment: per pt      DERMATOLOGY PAST MEDICAL HISTORY:      Warts treated with Cantharone     Acne treated with Duac    FAMILY HISTORY:  The patient has a family history of melanoma:  No    PHYSICAL EXAMINATION:    Scalp - pink plaques with silver scale. Elbows are clear(sebopsoriasis)      Today we examined patient's scalp and elbows. The patient is well nourished, well developed, and alert and oriented to person, place and time with appropriate mood and affect.    The patient was seen and examined by Vane Gr MD.  in the presence of my medical assistant  Ursula Haines LPN .  This office visit note was in part created by Ursula Haines LPN acting also as a scribe for Vane Gr MD.   Vane Gr MD    reviewed the  note for accuracy and completeness before signing.        Assessment and Plan:    1.  Sebopsoriasis - chronic condition    Reviewed diagnosis, etiology, and treatment    I prescribed  ketoconazole 2% shampoo. Apply to the scalp and affected areas as a shampoo. Leave on for 5 minutes. Then rinse it off.  ?  Do this every other day until the scaling resolves and then do this once each week as maintenance therapy.    I prescribed  Clobetasol 0.05% SOLUTION.  Apply twice a day to scaly rash of scalp as needed for scaling.  Dispense 60  mL.  Refills 11 .     We discussed phenomena of koebnerization and trauma inducing further lesions. Advised gentle skin care for patient.  We also discussed psoriatic arthritis and advised patient to see rheumatology if he starts getting joint aches and joint pains or morning stiffness.    Return to clinic 2 months .            On 4/12/2023, IUrsula LPN scribed the services personally performed by Vane Gr MD  The documentation recorded by the scribe accurately and completely reflects the service(s) I personally performed and the decisions made by me.

## 2023-06-13 ENCOUNTER — HOSPITAL ENCOUNTER (OUTPATIENT)
Dept: HOSPITAL 47 - RADUSWWP | Age: 73
Discharge: HOME | End: 2023-06-13
Attending: INTERNAL MEDICINE
Payer: MEDICARE

## 2023-06-13 DIAGNOSIS — N20.0: Primary | ICD-10-CM

## 2023-06-13 DIAGNOSIS — N28.1: ICD-10-CM

## 2023-06-13 PROCEDURE — 76770 US EXAM ABDO BACK WALL COMP: CPT

## 2023-06-13 NOTE — US
EXAMINATION TYPE: US kidneys/renal and bladder

 

DATE OF EXAM: 6/13/2023

 

COMPARISON: US 11/18/2022, CT abdomen and pelvis 10/12/2021

 

CLINICAL INDICATION: Male, 72 years old with history of N20.0 KIDNEY STONE; H/O kidney stones

 

EXAM MEASUREMENTS:

 

Right Kidney:  10.8 x 5.1 x 5.2 cm

Left Kidney: 11.9 x 6.1 x 4.8 cm

 

 

 

Right Kidney: No evidence of hydro/ Possible renal calculus lower pole= 0.6 cm   

Left Kidney: Cyst lower pole= 4.3 x 3.3 x 4.5 cm/ No evidence of hydro   

Bladder: wnl

**Bilateral Jets seen: No

 

 

There is no evidence for hydronephrosis at this point in time. Lower pole calculus measuring up to 0.
6 cm. No solid masses are identified.  Left lower pole renal simple appearing thin-walled cyst measur
ing up to 4.5 cm. Corticomedullary junction is maintained bilaterally. The urinary bladder is anechoi
c.  

 

 

 

IMPRESSION:

1. No hydronephrosis.

2. Nonobstructive right renal calculus.

3. Left renal lower pole simple cyst.

## 2023-08-05 ENCOUNTER — HOSPITAL ENCOUNTER (OUTPATIENT)
Dept: HOSPITAL 47 - LABWHC1 | Age: 73
Discharge: HOME | End: 2023-08-05
Attending: INTERNAL MEDICINE
Payer: MEDICARE

## 2023-08-05 DIAGNOSIS — R80.9: ICD-10-CM

## 2023-08-05 DIAGNOSIS — N39.0: ICD-10-CM

## 2023-08-05 DIAGNOSIS — D63.1: ICD-10-CM

## 2023-08-05 DIAGNOSIS — N18.32: ICD-10-CM

## 2023-08-05 DIAGNOSIS — N25.81: Primary | ICD-10-CM

## 2023-08-05 DIAGNOSIS — E55.9: ICD-10-CM

## 2023-08-05 DIAGNOSIS — M10.9: ICD-10-CM

## 2023-08-05 LAB — PROT/CREAT UR-RTO: 0.58

## 2023-08-05 PROCEDURE — 83550 IRON BINDING TEST: CPT

## 2023-08-05 PROCEDURE — 82043 UR ALBUMIN QUANTITATIVE: CPT

## 2023-08-05 PROCEDURE — 81001 URINALYSIS AUTO W/SCOPE: CPT

## 2023-08-05 PROCEDURE — 83970 ASSAY OF PARATHORMONE: CPT

## 2023-08-05 PROCEDURE — 84550 ASSAY OF BLOOD/URIC ACID: CPT

## 2023-08-05 PROCEDURE — 85025 COMPLETE CBC W/AUTO DIFF WBC: CPT

## 2023-08-05 PROCEDURE — 82570 ASSAY OF URINE CREATININE: CPT

## 2023-08-05 PROCEDURE — 82306 VITAMIN D 25 HYDROXY: CPT

## 2023-08-05 PROCEDURE — 83540 ASSAY OF IRON: CPT

## 2023-08-05 PROCEDURE — 84100 ASSAY OF PHOSPHORUS: CPT

## 2023-08-05 PROCEDURE — 82728 ASSAY OF FERRITIN: CPT

## 2023-08-05 PROCEDURE — 36415 COLL VENOUS BLD VENIPUNCTURE: CPT

## 2023-08-05 PROCEDURE — 80053 COMPREHEN METABOLIC PANEL: CPT

## 2023-08-05 PROCEDURE — 83735 ASSAY OF MAGNESIUM: CPT

## 2023-08-05 PROCEDURE — 84156 ASSAY OF PROTEIN URINE: CPT

## 2023-08-06 LAB
ALBUMIN SERPL-MCNC: 4.4 D/DL (ref 3.8–4.9)
ALBUMIN/GLOB SERPL: 2.1 RATIO (ref 1.6–3.17)
ALP SERPL-CCNC: 44 U/L (ref 41–126)
ALT SERPL-CCNC: 19 U/L (ref 10–49)
ANION GAP SERPL CALC-SCNC: 12.2 MMOL/L (ref 4–12)
AST SERPL-CCNC: 23 U/L (ref 14–35)
BASOPHILS # BLD AUTO: 0.05 X 10*3/UL (ref 0–0.1)
BASOPHILS NFR BLD AUTO: 0.6 %
BUN SERPL-SCNC: 21.9 MG/DL (ref 9–27)
BUN/CREAT SERPL: 11.53 RATIO (ref 12–20)
CALCIUM SPEC-MCNC: 9.6 MG/DL (ref 8.7–10.3)
CHLORIDE SERPL-SCNC: 106 MMOL/L (ref 96–109)
CO2 SERPL-SCNC: 25.8 MMOL/L (ref 21.6–31.8)
EOSINOPHIL # BLD AUTO: 0.47 X 10*3/UL (ref 0.04–0.35)
EOSINOPHIL NFR BLD AUTO: 5.9 %
ERYTHROCYTE [DISTWIDTH] IN BLOOD BY AUTOMATED COUNT: 4.21 X 10*6/UL (ref 4.4–5.6)
ERYTHROCYTE [DISTWIDTH] IN BLOOD: 13.1 % (ref 11.5–14.5)
FERRITIN SERPL-MCNC: 140 NG/ML (ref 22–322)
GLOBULIN SER CALC-MCNC: 2.1 D/DL (ref 1.6–3.3)
GLUCOSE SERPL-MCNC: 98 MG/DL (ref 70–110)
HCT VFR BLD AUTO: 40.3 % (ref 39.6–50)
HGB BLD-MCNC: 13.1 D/DL (ref 13–17)
IMM GRANULOCYTES BLD QL AUTO: 0.4 %
IRON SERPL-MCNC: 68 UG/DL (ref 65–175)
LYMPHOCYTES # SPEC AUTO: 1.54 X 10*3/UL (ref 0.9–5)
LYMPHOCYTES NFR SPEC AUTO: 19.3 %
MAGNESIUM SPEC-SCNC: 1.8 MG/DL (ref 1.5–2.4)
MCH RBC QN AUTO: 31.1 PG (ref 27–32)
MCHC RBC AUTO-ENTMCNC: 32.5 D/DL (ref 32–37)
MCV RBC AUTO: 95.7 FL (ref 80–97)
MONOCYTES # BLD AUTO: 0.54 X 10*3/UL (ref 0.2–1)
MONOCYTES NFR BLD AUTO: 6.8 %
NEUTROPHILS # BLD AUTO: 5.33 X 10*3/UL (ref 1.8–7.7)
NEUTROPHILS NFR BLD AUTO: 67 %
NRBC BLD AUTO-RTO: 0 X 10*3/UL (ref 0–0.01)
PH UR: 5.5 [PH]
PLATELET # BLD AUTO: 212 X 10*3/UL (ref 140–440)
POTASSIUM SERPL-SCNC: 4.2 MMOL/L (ref 3.5–5.5)
PROT SERPL-MCNC: 6.5 D/DL (ref 6.2–8.2)
SODIUM SERPL-SCNC: 144 MMOL/L (ref 135–145)
SP GR UR: 1.02 (ref 1–1.03)
TIBC SERPL-MCNC: 323 UG/DL (ref 228–460)
URATE SERPL-MCNC: 4.9 MG/DL (ref 3.7–8.7)
UROBILINOGEN UR QL: 0.2 E.U./DL
WBC # BLD AUTO: 7.96 X 10*3/UL (ref 4.5–10)

## 2025-05-23 ENCOUNTER — HOSPITAL ENCOUNTER (OUTPATIENT)
Dept: HOSPITAL 47 - RADXRMAIN | Age: 75
Discharge: HOME | End: 2025-05-23
Attending: UROLOGY
Payer: MEDICARE

## 2025-05-23 DIAGNOSIS — N20.0: Primary | ICD-10-CM

## 2025-05-23 PROCEDURE — 74018 RADEX ABDOMEN 1 VIEW: CPT

## 2025-06-10 ENCOUNTER — HOSPITAL ENCOUNTER (OUTPATIENT)
Dept: HOSPITAL 47 - OR | Age: 75
Discharge: HOME | End: 2025-06-10
Attending: UROLOGY
Payer: MEDICARE

## 2025-06-10 VITALS — DIASTOLIC BLOOD PRESSURE: 71 MMHG | HEART RATE: 71 BPM | SYSTOLIC BLOOD PRESSURE: 137 MMHG

## 2025-06-10 VITALS — RESPIRATION RATE: 16 BRPM

## 2025-06-10 VITALS — BODY MASS INDEX: 26.9 KG/M2

## 2025-06-10 VITALS — TEMPERATURE: 97.3 F

## 2025-06-10 DIAGNOSIS — Z79.899: ICD-10-CM

## 2025-06-10 DIAGNOSIS — E78.5: ICD-10-CM

## 2025-06-10 DIAGNOSIS — N20.0: Primary | ICD-10-CM

## 2025-06-10 DIAGNOSIS — Z79.4: ICD-10-CM

## 2025-06-10 DIAGNOSIS — N18.9: ICD-10-CM

## 2025-06-10 DIAGNOSIS — E11.22: ICD-10-CM

## 2025-06-10 DIAGNOSIS — Z79.84: ICD-10-CM

## 2025-06-10 DIAGNOSIS — I12.9: ICD-10-CM

## 2025-06-10 DIAGNOSIS — F32.A: ICD-10-CM

## 2025-06-10 DIAGNOSIS — J45.909: ICD-10-CM

## 2025-06-10 DIAGNOSIS — Z79.02: ICD-10-CM

## 2025-06-10 LAB
CALCULUS ANALYSIS WITH CALCULUS PHOTOGRAPHY [INTERPRETATION] IN STONE: (no result)
GLUCOSE BLD-MCNC: 107 MG/DL (ref 70–110)

## 2025-06-10 PROCEDURE — 52356 CYSTO/URETERO W/LITHOTRIPSY: CPT

## 2025-06-10 PROCEDURE — 74018 RADEX ABDOMEN 1 VIEW: CPT

## 2025-06-10 PROCEDURE — 82365 CALCULUS SPECTROSCOPY: CPT

## 2025-06-10 RX ADMIN — DEXAMETHASONE SODIUM PHOSPHATE ONE MG: 4 INJECTION, SOLUTION INTRAMUSCULAR; INTRAVENOUS at 12:36

## 2025-06-10 RX ADMIN — LIDOCAINE HYDROCHLORIDE PRN ML: 10 INJECTION, SOLUTION INFILTRATION; PERINEURAL at 12:35

## 2025-06-10 RX ADMIN — ONDANSETRON ONE MG: 2 INJECTION INTRAMUSCULAR; INTRAVENOUS at 12:36

## 2025-06-10 RX ADMIN — POTASSIUM CHLORIDE ONE MLS: 14.9 INJECTION, SOLUTION INTRAVENOUS at 12:34

## 2025-06-10 RX ADMIN — CEFAZOLIN SODIUM PRN MLS: 10 INJECTION, POWDER, FOR SOLUTION INTRAVENOUS at 12:46

## 2025-06-10 RX ADMIN — POTASSIUM CHLORIDE SCH MLS/HR: 14.9 INJECTION, SOLUTION INTRAVENOUS at 12:34

## 2025-06-27 ENCOUNTER — HOSPITAL ENCOUNTER (OUTPATIENT)
Dept: HOSPITAL 47 - EC | Age: 75
Setting detail: OBSERVATION
LOS: 1 days | Discharge: HOME | End: 2025-06-28
Attending: SURGERY | Admitting: SURGERY
Payer: MEDICARE

## 2025-06-27 DIAGNOSIS — E78.5: ICD-10-CM

## 2025-06-27 DIAGNOSIS — F32.A: ICD-10-CM

## 2025-06-27 DIAGNOSIS — Z79.84: ICD-10-CM

## 2025-06-27 DIAGNOSIS — N18.9: ICD-10-CM

## 2025-06-27 DIAGNOSIS — Z79.899: ICD-10-CM

## 2025-06-27 DIAGNOSIS — Z79.4: ICD-10-CM

## 2025-06-27 DIAGNOSIS — N20.0: ICD-10-CM

## 2025-06-27 DIAGNOSIS — I13.10: ICD-10-CM

## 2025-06-27 DIAGNOSIS — Z79.85: ICD-10-CM

## 2025-06-27 DIAGNOSIS — R74.8: ICD-10-CM

## 2025-06-27 DIAGNOSIS — K35.80: Primary | ICD-10-CM

## 2025-06-27 DIAGNOSIS — E11.22: ICD-10-CM

## 2025-06-27 DIAGNOSIS — J45.909: ICD-10-CM

## 2025-06-27 DIAGNOSIS — I12.9: ICD-10-CM

## 2025-06-27 LAB
ACANTHOCYTES BLD QL SMEAR: (no result)
AGRAN PLATELETS BLD QL SMEAR: (no result)
ALBUMIN SERPL-MCNC: 4.4 G/DL (ref 3.5–5)
ALP SERPL-CCNC: 52 U/L (ref 38–126)
ALT SERPL-CCNC: 19 U/L (ref 4–49)
AMORPH SED URNS QL MICRO: (no result) /HPF
AMYLASE SERPL-CCNC: 175 U/L (ref 30–110)
ANION GAP SERPL CALC-SCNC: 11 MMOL/L
ANISOCYTOSIS BLD QL SMEAR: (no result)
ANISOCYTOSIS BLD QL SMEAR: (no result)
ANISOCYTOSIS BLD QL: (no result)
APPEARANCE UR: CLEAR
AST SERPL-CCNC: 35 U/L (ref 17–59)
AUER BODIES BLD QL SMEAR: (no result)
BACTERIA UR QL AUTO: (no result) /HPF
BASO STIPL BLD QL SMEAR: (no result)
BASO STIPL BLD QL SMEAR: (no result)
BASOPHILS # BLD AUTO: 0.08 10*3/UL (ref 0–0.1)
BASOPHILS # BLD MANUAL: (no result) K/UL (ref 0–0.2)
BASOPHILS NFR BLD AUTO: 0.5 %
BASOPHILS NFR SPEC MANUAL: (no result) %
BILIRUB BLD-MCNC: 0.8 MG/DL (ref 0.2–1.3)
BILIRUB UR QL CFM: (no result)
BILIRUB UR QL STRIP.AUTO: NEGATIVE
BLASTS # BLD MANUAL: (no result) %
BLASTS # BLD MANUAL: (no result) K/UL
BROAD CASTS [PRESENCE] IN URINE BY COMPUTER ASSISTED METHOD: (no result) /LPF
BUN SERPL-SCNC: 31 MG/DL (ref 9–20)
BUN/CREAT SERPL: (no result)
BURR CELLS BLD QL SMEAR: (no result)
CA CARBONATE CRY #/AREA URNS HPF: (no result) /HPF
CA PHOS CRY UR QL COMP ASSIST: (no result) /HPF
CALCIUM SPEC-MCNC: 9.1 MG/DL (ref 8.4–10.2)
CAOX CRY UR QL COMP ASSIST: (no result) /HPF
CASTS URNS QL MICRO: (no result) /LPF
CELLS COUNTED: (no result)
CHLORIDE SERPL-SCNC: 106 MMOL/L (ref 98–107)
CO2 SERPL-SCNC: 25 MMOL/L (ref 22–30)
COLOR UR: (no result)
CREATININE: 2.02 MG/DL (ref 0.66–1.25)
CRYSTALS UR QL: (no result) /HPF
CYSTINE CRY #/AREA URNS HPF: (no result) /HPF
DACRYOCYTES BLD QL SMEAR: (no result)
DACRYOCYTES BLD QL SMEAR: (no result)
DOHLE BOD BLD QL SMEAR: (no result)
DOHLE BOD BLD QL SMEAR: (no result)
ELLIPTOCYTES BLD QL SMEAR: (no result)
EOSINOPHIL # BLD AUTO: 0.53 10*3/UL (ref 0.04–0.35)
EOSINOPHIL # BLD MANUAL: (no result) K/UL (ref 0–0.7)
EOSINOPHIL NFR BLD AUTO: 3.5 %
EOSINOPHIL NFR BLD MANUAL: (no result) %
EPITHELIAL CASTS [PRESENCE] IN URINE BY COMPUTER ASSISTED METHOD: (no result) /LPF
ERYTHROCYTE CLUMPS [PRESENCE] IN URINE BY COMPUTER ASSISTED METHOD: (no result) /HPF
ERYTHROCYTE [DISTWIDTH] IN BLOOD BY AUTOMATED COUNT: 4.42 10*6/UL (ref 4.4–5.6)
ERYTHROCYTE [DISTWIDTH] IN BLOOD: 13.2 % (ref 11.5–14.5)
FATTY CASTS #/AREA UR COMP ASSIST: (no result) /LPF
GIANT PLATELETS BLD QL SMEAR: (no result)
GLUCOSE BLD-MCNC: 64 MG/DL (ref 70–110)
GLUCOSE BLD-MCNC: 71 MG/DL (ref 70–110)
GLUCOSE BLD-MCNC: 76 MG/DL (ref 70–110)
GLUCOSE BLD-MCNC: 88 MG/DL (ref 70–110)
GLUCOSE SERPL-MCNC: 120 MG/DL (ref 74–99)
GLUCOSE UR QL: (no result)
GRAN CASTS UR QL COMP ASSIST: (no result) /LPF
HCT VFR BLD AUTO: 40.6 % (ref 39.6–50)
HELMET CELLS BLD QL SMEAR: (no result)
HGB BLD-MCNC: 13.5 G/DL (ref 13–17)
HOWELL-JOLLY BOD BLD QL SMEAR: (no result)
HOWELL-JOLLY BOD BLD QL SMEAR: (no result)
HYALINE CASTS UR QL AUTO: (no result) /LPF (ref 0–2)
HYPERCHROMASIA: (no result)
HYPOCHROMIA BLD QL SMEAR: (no result)
HYPOCHROMIA BLD QL SMEAR: (no result)
HYPOCHROMIA BLD QL: (no result)
IMM GRANULOCYTES # BLD: 0.07 10*3/UL (ref 0–0.04)
IMM GRANULOCYTES BLD QL AUTO: (no result)
KETONES UR QL STRIP.AUTO: NEGATIVE
LEUCINE CRYSTALS [PRESENCE] IN URINE BY COMPUTER ASSISTED METHOD: (no result) /HPF
LEUKOCYTE ESTERASE UR QL STRIP.AUTO: NEGATIVE
LG PLATELETS BLD QL SMEAR: (no result)
LG PLATELETS BLD QL SMEAR: (no result)
LIPASE SERPL-CCNC: 951 U/L (ref 23–300)
LYMPHOCYTES # BLD MANUAL: (no result) K/UL (ref 1–4.8)
LYMPHOCYTES # SPEC AUTO: 1.82 10*3/UL (ref 0.9–5)
LYMPHOCYTES NFR BLD MANUAL: (no result) %
LYMPHOCYTES NFR SPEC AUTO: 12.1 %
Lab: (no result)
MACROCYTES BLD QL SMEAR: (no result)
MACROCYTES BLD QL: (no result)
MCH RBC QN AUTO: 30.5 PG (ref 27–32)
MCHC RBC AUTO-ENTMCNC: 33.3 G/DL (ref 32–37)
MCV RBC AUTO: 91.9 FL (ref 80–97)
METAMYELOCYTES # BLD: (no result) K/UL
METAMYELOCYTES NFR BLD MANUAL: (no result) %
MICROCYTES BLD QL SMEAR: (no result)
MICROCYTOSIS: (no result)
MIXED CELL CASTS UR QL COMP ASSIST: (no result) /LPF
MONOCYTES # BLD AUTO: 1.17 10*3/UL (ref 0.2–1)
MONOCYTES # BLD MANUAL: (no result) K/UL (ref 0–1)
MONOCYTES NFR BLD AUTO: 7.8 %
MONOCYTES NFR BLD MANUAL: (no result) %
MUCOUS THREADS UR QL AUTO: (no result) /HPF
MYELOCYTES # BLD MANUAL: (no result) K/UL
MYELOCYTES NFR BLD MANUAL: (no result) %
NEUTROPHILS # BLD AUTO: 11.35 10*3/UL (ref 1.8–7.7)
NEUTROPHILS NFR BLD AUTO: 75.6 %
NEUTROPHILS NFR BLD MANUAL: (no result) %
NEUTS BAND # BLD MANUAL: (no result) K/UL
NEUTS BAND NFR BLD: (no result) %
NEUTS HYPERSEG # BLD: (no result) 10*3/UL
NEUTS HYPERSEG # BLD: (no result) 10*3/UL
NEUTS SEG # BLD MANUAL: (no result) K/UL (ref 1.3–7.7)
NITRITE UR QL STRIP.AUTO: NEGATIVE
NRBC # BLD: (no result) /100 WBC (ref 0–0)
NRBC BLD AUTO-RTO: (no result) %
NRBC BLD AUTO-RTO: (no result) %
OTHER CELLS NFR BLD MANUAL: (no result) %
OVAL FAT BODIES #/AREA UR COMP ASSIST: (no result) /HPF
OVALOCYTES BLD QL SMEAR: (no result)
OVALOCYTES BLD QL SMEAR: (no result)
PAPPENHEIMER BOD BLD QL SMEAR: (no result)
PARASITE [PRESENCE] IN BLOOD BY LIGHT MICROSCOPY: (no result)
PELGER HUET CELLS BLD QL SMEAR: (no result)
PELGER HUET CELLS BLD QL SMEAR: (no result)
PH UR: 5.5 [PH] (ref 5–8)
PLASMA CELLS # BLD MANUAL: (no result) %
PLASMA CELLS # BLD MANUAL: (no result) K/UL
PLATELET # BLD AUTO: 197 10*3/UL (ref 140–440)
PLATELETS.RETICULATED NFR BLD AUTO: (no result) %
PLATELETS.RETICULATED NFR BLD AUTO: (no result) %
PMV BLD AUTO: 9.6 FL (ref 9.5–12.2)
POIKILOCYTOSIS BLD QL SMEAR: (no result)
POIKILOCYTOSIS: (no result)
POLYCHROMASIA BLD QL SMEAR: (no result)
POLYCHROMASIA BLD QL SMEAR: (no result)
POTASSIUM SERPL-SCNC: 3.7 MMOL/L (ref 3.5–5.1)
PROMYELOCYTES # BLD: (no result) K/UL
PROMYELOCYTES NFR BLD MANUAL: (no result) %
PROT SERPL-MCNC: 7.2 G/DL (ref 6.3–8.2)
PROT UR QL SSA: (no result)
PROT UR QL: (no result)
RBC AGGLUTINATION: (no result)
RBC CASTS UR QL COMP ASSIST: (no result) /LPF
RBC MORPH BLD: (no result)
RBC MORPH BLD: (no result)
RBC UR QL: (no result) /HPF (ref 0–5)
RBC UR QL: NEGATIVE
RENAL EPI CELLS UR QL COMP ASSIST: (no result) /HPF
ROULEAUX BLD QL SMEAR: (no result)
ROULEAUX BLD QL SMEAR: (no result)
SCHISTOCYTES BLD QL SMEAR: (no result)
SICKLE CELLS BLD QL SMEAR: (no result)
SICKLE CELLS BLD QL SMEAR: (no result)
SMUDGE CELLS BLD QL SMEAR: (no result)
SODIUM SERPL-SCNC: 142 MMOL/L (ref 137–145)
SP GR UR: 1.03 (ref 1–1.03)
SPERM # UR AUTO: (no result) /HPF
SPHEROCYTES BLD QL SMEAR: (no result)
SPHEROCYTES BLD QL SMEAR: (no result)
SQUAMOUS UR QL AUTO: (no result) /HPF (ref 0–4)
STOMATOCYTES BLD QL SMEAR: (no result)
STOMATOCYTES BLD QL SMEAR: (no result)
TARGETS BLD QL SMEAR: (no result)
TARGETS BLD QL SMEAR: (no result)
TOXIC GRANULES BLD QL SMEAR: (no result)
TOXIC GRANULES BLD QL SMEAR: (no result)
TRANS CELLS UR QL COMP ASSIST: (no result) /HPF (ref 0–1)
TRI-PHOS CRY UR QL COMP ASSIST: (no result) /HPF
TRICHOMONAS UR QL COMP ASSIST: (no result) /HPF
TYROSINE CRYSTALS [PRESENCE] IN URINE BY COMPUTER ASSISTED METHOD: (no result) /HPF
URATE CRY UR QL COMP ASSIST: (no result) /HPF
UROBILINOGEN UR QL STRIP: <2 MG/DL (ref ?–2)
VARIANT LYMPHS BLD QL SMEAR: (no result)
WAXY CASTS #/AREA UR COMP ASSIST: (no result) /LPF
WBC # BLD AUTO: 15.02 10*3/UL (ref 4.5–10)
WBC # UR AUTO: (no result) /HPF (ref 0–5)
WBC CASTS #/AREA URNS LPF: (no result) /LPF
WBC CLUMPS UR QL AUTO: (no result) /HPF
WBC NRBC COR # BLD: (no result) K/UL
WBC NRBC COR # BLD: (no result) K/UL
WBC TOXIC VACUOLES BLD QL SMEAR: (no result)
WBC TOXIC VACUOLES BLD QL SMEAR: (no result)
YEAST BUDDING UR QL COMP ASSIST: (no result) /HPF
YEAST HYPHAE UR QL COMP ASSIST: (no result) /HPF

## 2025-06-27 PROCEDURE — 96366 THER/PROPH/DIAG IV INF ADDON: CPT

## 2025-06-27 PROCEDURE — 83690 ASSAY OF LIPASE: CPT

## 2025-06-27 PROCEDURE — 93005 ELECTROCARDIOGRAM TRACING: CPT

## 2025-06-27 PROCEDURE — 82150 ASSAY OF AMYLASE: CPT

## 2025-06-27 PROCEDURE — 96365 THER/PROPH/DIAG IV INF INIT: CPT

## 2025-06-27 PROCEDURE — 36415 COLL VENOUS BLD VENIPUNCTURE: CPT

## 2025-06-27 PROCEDURE — 81003 URINALYSIS AUTO W/O SCOPE: CPT

## 2025-06-27 PROCEDURE — 44970 LAPAROSCOPY APPENDECTOMY: CPT

## 2025-06-27 PROCEDURE — 80053 COMPREHEN METABOLIC PANEL: CPT

## 2025-06-27 PROCEDURE — 96367 TX/PROPH/DG ADDL SEQ IV INF: CPT

## 2025-06-27 PROCEDURE — 74176 CT ABD & PELVIS W/O CONTRAST: CPT

## 2025-06-27 PROCEDURE — 96375 TX/PRO/DX INJ NEW DRUG ADDON: CPT

## 2025-06-27 PROCEDURE — 85025 COMPLETE CBC W/AUTO DIFF WBC: CPT

## 2025-06-27 PROCEDURE — 99285 EMERGENCY DEPT VISIT HI MDM: CPT

## 2025-06-27 PROCEDURE — 96376 TX/PRO/DX INJ SAME DRUG ADON: CPT

## 2025-06-27 PROCEDURE — 87040 BLOOD CULTURE FOR BACTERIA: CPT

## 2025-06-27 PROCEDURE — 96361 HYDRATE IV INFUSION ADD-ON: CPT

## 2025-06-27 PROCEDURE — 88304 TISSUE EXAM BY PATHOLOGIST: CPT

## 2025-06-27 RX ADMIN — PANTOPRAZOLE SODIUM STA MG: 40 INJECTION, POWDER, FOR SOLUTION INTRAVENOUS at 04:40

## 2025-06-27 RX ADMIN — CEFAZOLIN ONE MLS: 330 INJECTION, POWDER, FOR SOLUTION INTRAMUSCULAR; INTRAVENOUS at 23:25

## 2025-06-27 RX ADMIN — MORPHINE SULFATE PRN MG: 4 INJECTION, SOLUTION INTRAMUSCULAR; INTRAVENOUS at 17:53

## 2025-06-27 RX ADMIN — CEFAZOLIN ONE MLS: 330 INJECTION, POWDER, FOR SOLUTION INTRAMUSCULAR; INTRAVENOUS at 22:03

## 2025-06-27 RX ADMIN — POTASSIUM CHLORIDE SCH MLS/HR: 14.9 INJECTION, SOLUTION INTRAVENOUS at 07:21

## 2025-06-27 RX ADMIN — CEFAZOLIN ONE MLS: 330 INJECTION, POWDER, FOR SOLUTION INTRAMUSCULAR; INTRAVENOUS at 23:41

## 2025-06-27 RX ADMIN — TAMSULOSIN HYDROCHLORIDE STA MG: 0.4 CAPSULE ORAL at 23:35

## 2025-06-27 RX ADMIN — PIPERACILLIN AND TAZOBACTAM SCH MLS/HR: 3; .375 INJECTION, POWDER, FOR SOLUTION INTRAVENOUS at 07:20

## 2025-06-27 RX ADMIN — MORPHINE SULFATE STA MG: 4 INJECTION, SOLUTION INTRAMUSCULAR; INTRAVENOUS at 06:45

## 2025-06-27 RX ADMIN — CEFAZOLIN SCH MLS/HR: 330 INJECTION, POWDER, FOR SOLUTION INTRAMUSCULAR; INTRAVENOUS at 23:11

## 2025-06-27 RX ADMIN — INSULIN LISPRO SCH: 100 INJECTION, SOLUTION INTRAVENOUS; SUBCUTANEOUS at 13:29

## 2025-06-27 RX ADMIN — KETOROLAC TROMETHAMINE STA MG: 15 INJECTION, SOLUTION INTRAMUSCULAR; INTRAVENOUS at 04:41

## 2025-06-27 RX ADMIN — LISINOPRIL SCH: 5 TABLET ORAL at 10:57

## 2025-06-27 RX ADMIN — INSULIN LISPRO SCH: 100 INJECTION, SOLUTION INTRAVENOUS; SUBCUTANEOUS at 12:38

## 2025-06-27 RX ADMIN — POTASSIUM CHLORIDE ONE MLS/HR: 14.9 INJECTION, SOLUTION INTRAVENOUS at 06:48

## 2025-06-27 RX ADMIN — LIDOCAINE HYDROCHLORIDE AND EPINEPHRINE ONE ML: 10; 10 INJECTION, SOLUTION INFILTRATION; PERINEURAL at 21:51

## 2025-06-27 RX ADMIN — CEFAZOLIN ONE MLS: 330 INJECTION, POWDER, FOR SOLUTION INTRAMUSCULAR; INTRAVENOUS at 11:10

## 2025-06-27 RX ADMIN — POTASSIUM CHLORIDE ONE MLS/HR: 14.9 INJECTION, SOLUTION INTRAVENOUS at 04:47

## 2025-06-27 RX ADMIN — POTASSIUM CHLORIDE ONE MLS: 14.9 INJECTION, SOLUTION INTRAVENOUS at 19:40

## 2025-06-27 RX ADMIN — DEXTROSE MONOHYDRATE STA ML: 25 INJECTION, SOLUTION INTRAVENOUS at 16:18

## 2025-06-27 RX ADMIN — DEXTROSE MONOHYDRATE ONE ML: 25 INJECTION, SOLUTION INTRAVENOUS at 20:05

## 2025-06-27 RX ADMIN — ONDANSETRON STA MG: 2 INJECTION INTRAMUSCULAR; INTRAVENOUS at 04:44

## 2025-06-27 RX ADMIN — POTASSIUM CHLORIDE ONE MLS: 14.9 INJECTION, SOLUTION INTRAVENOUS at 21:31

## 2025-06-28 VITALS
TEMPERATURE: 98 F | DIASTOLIC BLOOD PRESSURE: 75 MMHG | HEART RATE: 60 BPM | RESPIRATION RATE: 16 BRPM | SYSTOLIC BLOOD PRESSURE: 143 MMHG

## 2025-06-28 LAB
ALBUMIN SERPL-MCNC: 3.2 G/DL (ref 3.5–5)
ALBUMIN/GLOB SERPL: 1.6 {RATIO}
ALP SERPL-CCNC: 29 U/L (ref 38–126)
ALT SERPL-CCNC: 14 U/L (ref 4–49)
ANION GAP SERPL CALC-SCNC: 5 MMOL/L
AST SERPL-CCNC: 22 U/L (ref 17–59)
BASOPHILS # BLD AUTO: 0.04 10*3/UL (ref 0–0.1)
BASOPHILS NFR BLD AUTO: 0.5 %
BILIRUB BLD-MCNC: 1 MG/DL (ref 0.2–1.3)
BUN SERPL-SCNC: 23 MG/DL (ref 9–20)
CALCIUM SPEC-MCNC: 8.2 MG/DL (ref 8.4–10.2)
CHLORIDE SERPL-SCNC: 109 MMOL/L (ref 98–107)
CO2 SERPL-SCNC: 24 MMOL/L (ref 22–30)
CREATININE: 1.76 MG/DL (ref 0.66–1.25)
EOSINOPHIL # BLD AUTO: 0.2 10*3/UL (ref 0.04–0.35)
EOSINOPHIL NFR BLD AUTO: 2.6 %
ERYTHROCYTE [DISTWIDTH] IN BLOOD BY AUTOMATED COUNT: 3.58 10*6/UL (ref 4.4–5.6)
ERYTHROCYTE [DISTWIDTH] IN BLOOD: 13.1 % (ref 11.5–14.5)
GLOBULIN SER CALC-MCNC: 2 G/DL
GLUCOSE BLD-MCNC: 98 MG/DL (ref 70–110)
GLUCOSE SERPL-MCNC: 113 MG/DL (ref 74–99)
HCT VFR BLD AUTO: 33.9 % (ref 39.6–50)
HGB BLD-MCNC: 10.8 G/DL (ref 13–17)
IMM GRANULOCYTES # BLD: 0.03 10*3/UL (ref 0–0.04)
LYMPHOCYTES # SPEC AUTO: 1.54 10*3/UL (ref 0.9–5)
LYMPHOCYTES NFR SPEC AUTO: 19.7 %
MCH RBC QN AUTO: 30.2 PG (ref 27–32)
MCHC RBC AUTO-ENTMCNC: 31.9 G/DL (ref 32–37)
MCV RBC AUTO: 94.7 FL (ref 80–97)
MONOCYTES # BLD AUTO: 0.57 10*3/UL (ref 0.2–1)
MONOCYTES NFR BLD AUTO: 7.3 %
NEUTROPHILS # BLD AUTO: 5.44 10*3/UL (ref 1.8–7.7)
NEUTROPHILS NFR BLD AUTO: 69.5 %
PLATELET # BLD AUTO: 127 10*3/UL (ref 140–440)
PMV BLD AUTO: 9.2 FL (ref 9.5–12.2)
POTASSIUM SERPL-SCNC: 4.7 MMOL/L (ref 3.5–5.1)
PROT SERPL-MCNC: 5.2 G/DL (ref 6.3–8.2)
SODIUM SERPL-SCNC: 138 MMOL/L (ref 137–145)
WBC # BLD AUTO: 7.82 10*3/UL (ref 4.5–10)

## 2025-06-28 RX ADMIN — ATORVASTATIN CALCIUM SCH MG: 20 TABLET, FILM COATED ORAL at 08:19

## 2025-06-28 RX ADMIN — ACETAMINOPHEN SCH MLS/HR: 10 INJECTION, SOLUTION INTRAVENOUS at 00:16

## 2025-06-28 RX ADMIN — ESCITALOPRAM OXALATE SCH MG: 20 TABLET, FILM COATED ORAL at 08:19

## 2025-06-28 RX ADMIN — SIMETHICONE SCH MG: 20 SUSPENSION/ DROPS ORAL at 00:08

## 2025-06-28 RX ADMIN — ALLOPURINOL SCH MG: 100 TABLET ORAL at 08:19

## 2025-06-28 RX ADMIN — INSULIN LISPRO SCH UNIT: 100 INJECTION, SOLUTION INTRAVENOUS; SUBCUTANEOUS at 08:20

## 2025-06-28 RX ADMIN — INSULIN GLARGINE SCH UNIT: 100 INJECTION, SOLUTION SUBCUTANEOUS at 08:20
